# Patient Record
Sex: FEMALE | Race: WHITE | NOT HISPANIC OR LATINO | Employment: FULL TIME | URBAN - METROPOLITAN AREA
[De-identification: names, ages, dates, MRNs, and addresses within clinical notes are randomized per-mention and may not be internally consistent; named-entity substitution may affect disease eponyms.]

---

## 2017-04-04 ENCOUNTER — ALLSCRIPTS OFFICE VISIT (OUTPATIENT)
Dept: OTHER | Facility: OTHER | Age: 47
End: 2017-04-04

## 2017-04-04 DIAGNOSIS — E78.5 HYPERLIPIDEMIA: ICD-10-CM

## 2017-04-04 DIAGNOSIS — E55.9 VITAMIN D DEFICIENCY: ICD-10-CM

## 2017-04-04 DIAGNOSIS — R53.83 OTHER FATIGUE: ICD-10-CM

## 2017-04-08 ENCOUNTER — TRANSCRIBE ORDERS (OUTPATIENT)
Dept: ADMINISTRATIVE | Facility: HOSPITAL | Age: 47
End: 2017-04-08

## 2017-04-08 ENCOUNTER — APPOINTMENT (OUTPATIENT)
Dept: LAB | Facility: HOSPITAL | Age: 47
End: 2017-04-08
Payer: COMMERCIAL

## 2017-04-08 DIAGNOSIS — E55.9 VITAMIN D DEFICIENCY: ICD-10-CM

## 2017-04-08 DIAGNOSIS — R53.83 OTHER FATIGUE: ICD-10-CM

## 2017-04-08 LAB
25(OH)D3 SERPL-MCNC: 32.8 NG/ML (ref 30–100)
ALBUMIN SERPL BCP-MCNC: 4.1 G/DL (ref 3.5–5)
ALP SERPL-CCNC: 69 U/L (ref 46–116)
ALT SERPL W P-5'-P-CCNC: 44 U/L (ref 12–78)
ANION GAP SERPL CALCULATED.3IONS-SCNC: 8 MMOL/L (ref 4–13)
AST SERPL W P-5'-P-CCNC: 22 U/L (ref 5–45)
BILIRUB SERPL-MCNC: 1 MG/DL (ref 0.2–1)
BUN SERPL-MCNC: 12 MG/DL (ref 5–25)
CALCIUM SERPL-MCNC: 9.3 MG/DL (ref 8.3–10.1)
CHLORIDE SERPL-SCNC: 105 MMOL/L (ref 100–108)
CHOLEST SERPL-MCNC: 213 MG/DL (ref 50–200)
CO2 SERPL-SCNC: 30 MMOL/L (ref 21–32)
CREAT SERPL-MCNC: 0.74 MG/DL (ref 0.6–1.3)
ERYTHROCYTE [DISTWIDTH] IN BLOOD BY AUTOMATED COUNT: 13.7 % (ref 11.6–15.1)
GFR SERPL CREATININE-BSD FRML MDRD: >60 ML/MIN/1.73SQ M
GLUCOSE P FAST SERPL-MCNC: 91 MG/DL (ref 65–99)
HCT VFR BLD AUTO: 45.8 % (ref 34.8–46.1)
HDLC SERPL-MCNC: 63 MG/DL (ref 40–60)
HGB BLD-MCNC: 15.2 G/DL (ref 11.5–15.4)
LDLC SERPL CALC-MCNC: 123 MG/DL (ref 0–100)
MCH RBC QN AUTO: 30.6 PG (ref 26.8–34.3)
MCHC RBC AUTO-ENTMCNC: 33.2 G/DL (ref 31.4–37.4)
MCV RBC AUTO: 92 FL (ref 82–98)
PLATELET # BLD AUTO: 232 THOUSANDS/UL (ref 149–390)
PMV BLD AUTO: 11.1 FL (ref 8.9–12.7)
POTASSIUM SERPL-SCNC: 3.8 MMOL/L (ref 3.5–5.3)
PROT SERPL-MCNC: 7.1 G/DL (ref 6.4–8.2)
RBC # BLD AUTO: 4.96 MILLION/UL (ref 3.81–5.12)
SODIUM SERPL-SCNC: 143 MMOL/L (ref 136–145)
TRIGL SERPL-MCNC: 137 MG/DL
TSH SERPL DL<=0.05 MIU/L-ACNC: 1.5 UIU/ML (ref 0.36–3.74)
WBC # BLD AUTO: 4.96 THOUSAND/UL (ref 4.31–10.16)

## 2017-04-08 PROCEDURE — 84443 ASSAY THYROID STIM HORMONE: CPT

## 2017-04-08 PROCEDURE — 85027 COMPLETE CBC AUTOMATED: CPT

## 2017-04-08 PROCEDURE — 80061 LIPID PANEL: CPT

## 2017-04-08 PROCEDURE — 80053 COMPREHEN METABOLIC PANEL: CPT

## 2017-04-08 PROCEDURE — 82306 VITAMIN D 25 HYDROXY: CPT

## 2017-04-08 PROCEDURE — 36415 COLL VENOUS BLD VENIPUNCTURE: CPT

## 2017-04-10 ENCOUNTER — GENERIC CONVERSION - ENCOUNTER (OUTPATIENT)
Dept: OTHER | Facility: OTHER | Age: 47
End: 2017-04-10

## 2017-06-28 DIAGNOSIS — Z12.31 ENCOUNTER FOR SCREENING MAMMOGRAM FOR MALIGNANT NEOPLASM OF BREAST: ICD-10-CM

## 2017-07-10 DIAGNOSIS — E78.5 HYPERLIPIDEMIA: ICD-10-CM

## 2017-08-15 ENCOUNTER — ALLSCRIPTS OFFICE VISIT (OUTPATIENT)
Dept: OTHER | Facility: OTHER | Age: 47
End: 2017-08-15

## 2017-09-20 DIAGNOSIS — Z12.31 ENCOUNTER FOR SCREENING MAMMOGRAM FOR MALIGNANT NEOPLASM OF BREAST: ICD-10-CM

## 2017-10-11 ENCOUNTER — GENERIC CONVERSION - ENCOUNTER (OUTPATIENT)
Dept: OTHER | Facility: OTHER | Age: 47
End: 2017-10-11

## 2018-01-08 DIAGNOSIS — Z12.31 ENCOUNTER FOR SCREENING MAMMOGRAM FOR MALIGNANT NEOPLASM OF BREAST: ICD-10-CM

## 2018-01-11 NOTE — RESULT NOTES
Message   pap and hpv negative send letter     Verified Results  (1) THIN PREP PAP WITH IMAGING [id number] 01:22PM Inocencia Strauss     Test Name Result Flag Reference   LAB AP CASE REPORT (Report)     Gynecologic Cytology Report            Case: [id number]                  Authorizing Provider: Marko Rioc MD     Collected:      01/12/2016 1322        First Screen:     YAMILETH Dior    Received:      01/19/2016 1322        Specimen:  LIQUID-BASED PAP, SCREENING, Cervix   LAB AP GYN PRIMARY INTERPRETATION      Negative for intraepithelial lesion or malignancy   LAB AP GYN SPECIMEN ADEQUACY      Satisfactory for evaluation  Absence of endocervical/transformation zone component  LAB AP GYN ADDITIONAL INFORMATION (Report)     [employer] FDA approved ,  and ThinPrep Imaging System are   utilized with strict adherence to the 's instruction manual to   prepare gynecologic and non-gynecologic cytology specimens for the   production of ThinPrep slides as well as for gynecologic ThinPrep imaging  These processes have been validated by our laboratory and/or by the     The Pap test is not a diagnostic procedure and should not be used as the   sole means to detect cervical cancer  It is only a screening procedure to   aid in the detection of cervical cancer and its precursors  Both   false-negative and false-positive results have been experienced  Your   patient's test result should be interpreted in this context together with   the history and clinical findings     LAB AP LMP 1/12/2014

## 2018-01-12 VITALS
HEIGHT: 68 IN | DIASTOLIC BLOOD PRESSURE: 78 MMHG | WEIGHT: 182.4 LBS | BODY MASS INDEX: 27.65 KG/M2 | SYSTOLIC BLOOD PRESSURE: 122 MMHG

## 2018-01-14 VITALS
BODY MASS INDEX: 28.72 KG/M2 | WEIGHT: 189.5 LBS | HEIGHT: 68 IN | DIASTOLIC BLOOD PRESSURE: 68 MMHG | SYSTOLIC BLOOD PRESSURE: 116 MMHG

## 2018-01-14 NOTE — RESULT NOTES
Verified Results  * XR FOOT 3+ VIEW RIGHT [id number] 01:54PM Valentino Somerset    Order Number: [id number]     Test Name Result Flag Reference   XR FOOT 3+ VW RIGHT (Report)     RIGHT FOOT     INDICATION: Pain over the 4th and 5th metatarsals for 2 months     COMPARISON: None     VIEWS: 3; 3 images     FINDINGS:     There is no acute fracture or dislocation  No degenerative changes  No lytic or blastic lesions are seen  Soft tissues are unremarkable  IMPRESSION:     No acute osseous abnormality         Workstation performed: [id number]     Signed by:   Shon Buerger, MD   2/12/16

## 2018-01-15 NOTE — RESULT NOTES
Verified Results  (1) VITAMIN D 25-HYDROXY 08Apr2017 08:37AM Ronda Bence Order Number: [id number]     Test Name Result Flag Reference   VIT D 25-HYDROX 32 8 ng/mL  30 0-100 0   This assay is a certified procedure of the CDC Vitamin D Standardization Certification Program (VDSCP)     Deficiency <20ng/ml   Insufficiency 20-30ng/ml   Sufficient  ng/ml     *Patients undergoing fluorescein dye angiography may retain small amounts of fluorescein in the body for 48-72 hours post procedure  Samples containing fluorescein can produce falsely elevated Vitamin D values  If the patient had this procedure, a specimen should be resubmitted post fluorescein clearance  (1) TSH 08Apr2017 08:37AM Ronda Bence Order Number: [id number]     Test Name Result Flag Reference   TSH 1 504 uIU/mL  0 358-3 740   - Patient Instructions: This bloodwork is non-fasting  Please drink two glasses of water morning of bloodwork  - Patient Instructions: This is a fasting blood test  Water, black tea or black coffee only after 9:00pm the night before test Drink 2 glasses of water the morning of test - Patient Instructions: This bloodwork is non-fasting  Please drink two glasses of   water morning of bloodwork  Patients undergoing fluorescein dye angiography may retain small amounts of fluorescein in the body for 48-72 hours post procedure  Samples containing fluorescein can produce falsely depressed TSH values  If the patient had this procedure,a specimen should be resubmitted post fluorescein clearance            The recommended reference ranges for TSH during pregnancy are as follows:  First trimester 0 1 to 2 5 uIU/mL  Second trimester  0 2 to 3 0 uIU/mL  Third trimester 0 3 to 3 0 uIU/m     (1) LIPID PANEL FASTING W DIRECT LDL REFLEX 08Apr2017 08:37AM Ronda Bence Order Number: [id number]     Test Name Result Flag Reference   CHOLESTEROL 213 mg/dL H    LDL CHOLESTEROL CALCULATED 123 mg/dL H 0-100   - Patient Instructions: This is a fasting blood test  Water, black tea or black coffee only after 9:00pm the night before test   Drink 2 glasses of water the morning of test     - Patient Instructions: This is a fasting blood test  Water, black tea or black coffee only after 9:00pm the night before test Drink 2 glasses of water the morning of test - Patient Instructions: This bloodwork is non-fasting  Please drink two glasses of   water morning of bloodwork  Triglyceride:         Normal              <150 mg/dl       Borderline High    150-199 mg/dl       High               200-499 mg/dl       Very High          >499 mg/dl  Cholesterol:         Desirable        <200 mg/dl      Borderline High  200-239 mg/dl      High             >239 mg/dl  HDL Cholesterol:        High    >59 mg/dL      Low     <41 mg/dL  LDL Cholesterol:        Optimal          <100 mg/dl        Near Optimal     100-129 mg/dl        Above Optimal          Borderline High   130-159 mg/dl          High              160-189 mg/dl          Very High        >189 mg/dl  LDL CALCULATED:    This screening LDL is a calculated result  It does not have the accuracy of the Direct Measured LDL in the monitoring of patients with hyperlipidemia and/or statin therapy  Direct Measure LDL (VTC165) must be ordered separately in these patients  TRIGLYCERIDES 137 mg/dL  <=150   Specimen collection should occur prior to N-Acetylcysteine or Metamizole administration due to the potential for falsely depressed results  HDL,DIRECT 63 mg/dL H 40-60   Specimen collection should occur prior to Metamizole administration due to the potential for falsely depressed results       (1) CBC/ PLT (NO DIFF) 08Apr2017 08:37AM Allison Estancia Order Number: [id number]     Test Name Result Flag Reference   HEMATOCRIT 45 8 %  34 8-46 1   HEMOGLOBIN 15 2 g/dL  11 5-15 4   MCHC 33 2 g/dL  31 4-37 4   MCH 30 6 pg  26 8-34 3   MCV 92 fL  82-98   PLATELET COUNT 232 Thousands/uL  149-390   RBC COUNT 4 96 Million/uL  3 81-5 12   RDW 13 7 %  11 6-15 1   WBC COUNT 4 96 Thousand/uL  4 31-10 16   MPV 11 1 fL  8 9-12 7     (1) COMPREHENSIVE METABOLIC PANEL [id number] 85:17CA Eliana Schumacher Order Number: [id number]     Test Name Result Flag Reference   SODIUM 143 mmol/L  136-145   POTASSIUM 3 8 mmol/L  3 5-5 3   CHLORIDE 105 mmol/L  100-108   CARBON DIOXIDE 30 mmol/L  21-32   ANION GAP (CALC) 8 mmol/L  4-13   BLOOD UREA NITROGEN 12 mg/dL  5-25   CREATININE 0 74 mg/dL  0 60-1 30   Standardized to IDMS reference method   CALCIUM 9 3 mg/dL  8 3-10 1   BILI, TOTAL 1 00 mg/dL  0 20-1 00   ALK PHOSPHATAS 69 U/L     ALT (SGPT) 44 U/L  12-78   AST(SGOT) 22 U/L  5-45   ALBUMIN 4 1 g/dL  3 5-5 0   TOTAL PROTEIN 7 1 g/dL  6 4-8 2   eGFR Non-African American      >60 0 ml/min/1 73sq m   - Patient Instructions: This is a fasting blood test  Water, black tea or black coffee only after 9:00pm the night before test Drink 2 glasses of water the morning of test - Patient Instructions: This bloodwork is non-fasting  Please drink two glasses of   water morning of bloodwork  National Kidney Disease Education Program recommendations are as follows:  GFR calculation is accurate only with a steady state creatinine  Chronic Kidney disease less than 60 ml/min/1 73 sq  meters  Kidney failure less than 15 ml/min/1 73 sq  meters     GLUCOSE FASTING 91 mg/dL  65-99

## 2018-01-16 NOTE — RESULT NOTES
Verified Results  (1) THIN PREP PAP WITH IMAGING [id number] 01:22PM Connie Shea   Other High Risk HPV Negative, HPV 16 Negative, HPV 18 Negative  HPV types: 16,18,31,33,35,39,45,51,52,56,58,59,66 and 68 DNA are undetectable or below the pre-set threshold  [employer]'s FDA approved Mango 4800 is utilized with strict adherence to the 's instruction  manual to test for the presence of High-Risk HPV DNA, as well as HPV 16 and HPV 18  This instrument  has been validated by our laboratory and/or by the   A negative result does not preclude the presence of HPV infection because results depend on adequate  specimen collection, absence of inhibitors and sufficient DNA to be detected  Additionally, HPV negative  results are not intended to prevent women from proceeding to colposcopy if clinically warranted  Positive HPV test results indicate the presence of any one or more of the high risk types, but since patients  are often co-infected with low-risk types it does not rule out the presence of low-risk types in patients  with mixed infections       Test Name Result Flag Reference   HPV, HIGH-RISK   HPV NEG, HPV16 NEG, HPV18 NEG   HPV NEG, HPV16 NEG, HPV18 NEG

## 2018-01-18 NOTE — RESULT NOTES
Verified Results  (1) VITAMIN D 25-HYDROXY 20Apr2016 02:01PM Haley Irvin Order Number: [id number]     Order Number: [id number]     Test Name Result Flag Reference   VIT D 25-HYDROX 31 9 ng/mL  30 0-100 0

## 2018-03-26 DIAGNOSIS — I10 ESSENTIAL HYPERTENSION: Primary | ICD-10-CM

## 2018-03-26 RX ORDER — METOPROLOL SUCCINATE 25 MG/1
TABLET, EXTENDED RELEASE ORAL
Qty: 90 TABLET | Refills: 3 | Status: SHIPPED | OUTPATIENT
Start: 2018-03-26 | End: 2019-03-24 | Stop reason: SDUPTHER

## 2018-04-09 ENCOUNTER — APPOINTMENT (OUTPATIENT)
Dept: URGENT CARE | Facility: CLINIC | Age: 48
End: 2018-04-09
Payer: OTHER MISCELLANEOUS

## 2018-04-09 PROCEDURE — 99283 EMERGENCY DEPT VISIT LOW MDM: CPT | Performed by: NURSE PRACTITIONER

## 2018-04-09 PROCEDURE — G0382 LEV 3 HOSP TYPE B ED VISIT: HCPCS | Performed by: NURSE PRACTITIONER

## 2018-04-11 ENCOUNTER — APPOINTMENT (OUTPATIENT)
Dept: URGENT CARE | Facility: CLINIC | Age: 48
End: 2018-04-11
Payer: OTHER MISCELLANEOUS

## 2018-04-11 PROCEDURE — 99213 OFFICE O/P EST LOW 20 MIN: CPT | Performed by: PHYSICIAN ASSISTANT

## 2018-04-18 ENCOUNTER — APPOINTMENT (OUTPATIENT)
Dept: URGENT CARE | Facility: CLINIC | Age: 48
End: 2018-04-18
Payer: OTHER MISCELLANEOUS

## 2018-04-18 PROCEDURE — 99213 OFFICE O/P EST LOW 20 MIN: CPT | Performed by: PHYSICIAN ASSISTANT

## 2018-05-07 ENCOUNTER — APPOINTMENT (OUTPATIENT)
Dept: URGENT CARE | Facility: CLINIC | Age: 48
End: 2018-05-07
Payer: OTHER MISCELLANEOUS

## 2018-05-07 PROCEDURE — 99213 OFFICE O/P EST LOW 20 MIN: CPT | Performed by: PHYSICIAN ASSISTANT

## 2018-05-08 ENCOUNTER — ANNUAL EXAM (OUTPATIENT)
Dept: OBGYN CLINIC | Facility: CLINIC | Age: 48
End: 2018-05-08
Payer: COMMERCIAL

## 2018-05-08 VITALS — BODY MASS INDEX: 28.86 KG/M2 | DIASTOLIC BLOOD PRESSURE: 70 MMHG | WEIGHT: 187 LBS | SYSTOLIC BLOOD PRESSURE: 122 MMHG

## 2018-05-08 DIAGNOSIS — Z01.419 ENCOUNTER FOR WELL WOMAN EXAM WITH ROUTINE GYNECOLOGICAL EXAM: Primary | ICD-10-CM

## 2018-05-08 PROCEDURE — S0612 ANNUAL GYNECOLOGICAL EXAMINA: HCPCS | Performed by: OBSTETRICS & GYNECOLOGY

## 2018-05-08 RX ORDER — DESVENLAFAXINE 50 MG/1
TABLET, EXTENDED RELEASE ORAL
COMMUNITY
End: 2018-07-16 | Stop reason: SDUPTHER

## 2018-05-08 RX ORDER — FEXOFENADINE HCL 180 MG/1
1 TABLET ORAL DAILY
COMMUNITY
Start: 2015-04-10 | End: 2021-10-20

## 2018-05-08 NOTE — PROGRESS NOTES
ASSESSMENT & PLAN: Darron Harrison is a 50 y o  [id number] with normal gynecologic exam     1   Routine well woman exam done today  2  Pap and HPV:  The patient's last pap and hpv was 2016  Prior hysterectomy, but had abnormal paps in past     It was normal     Pap and cotesting was not done today  Current ASCCP Guidelines reviewed  3   Mammogram ordered  4  The following were reviewed in today's visit: breast self exam and mammography screening ordered      CC:  Annual Gynecologic Examination    HPI: Darron Harrison is a 50 y o  [id number] who presents for annual gynecologic examination  She has the following concerns:  No GYN complaints    Health Maintenance:    She wears her seatbelt routinely  She does perform regular monthly self breast exams  She feels safe at home  History reviewed  No pertinent past medical history  Past Surgical History:   Procedure Laterality Date    CHOLECYSTECTOMY      HYSTERECTOMY      TOOTH EXTRACTION         Past OB/Gyn History:  OB History      Para Term  AB Living    4 3 3   1 3    SAB TAB Ectopic Multiple Live Births    1       3           Pt does not have menstrual issues     History of sexually transmitted infection: no   History of abnormal pap smears: Yes   Patient is currently sexually active  heterosexual  The current method of family planning is status post hysterectomy  Family History   Problem Relation Age of Onset    Alzheimer's disease Mother     Heart disease Father      cardiac disorder    Prostate cancer Father        Social History:  Social History     Social History    Marital status: /Civil Union     Spouse name: N/A    Number of children: N/A    Years of education: N/A     Occupational History    Not on file       Social History Main Topics    Smoking status: Never Smoker    Smokeless tobacco: Never Used    Alcohol use Yes      Comment: social drinker    Drug use: No    Sexual activity: Yes     Partners: Male     Birth control/ protection: None     Other Topics Concern    Not on file     Social History Narrative    No narrative on file     Allergies   Allergen Reactions    Codeine Other (See Comments)     "throat swells"       Current Outpatient Prescriptions:     desvenlafaxine succinate (PRISTIQ) 50 mg 24 hr tablet, Pristiq 50 mg tablet,extended release, Disp: , Rfl:     fexofenadine (ALLEGRA) 180 MG tablet, Take 1 tablet by mouth daily, Disp: , Rfl:     metoprolol succinate (TOPROL-XL) 25 mg 24 hr tablet, TAKE 1 TABLET DAILY, Disp: 90 tablet, Rfl: 3    Review of Systems:    Review of Systems  Constitutional :no fever, feels well, no tiredness, no recent weight gain or loss  ENT: no ear ache, no loss of hearing, no nosebleeds or nasal discharge, no sore throat or hoarseness  Cardiovascular: no complaints of slow or fast heart beat, no chest pain, no palpitations, no leg claudication or lower extremity edema  Respiratory: no complaints of shortness of shortness of breath, no LI  Breasts:no complaints of breast pain, breast lump, or nipple discharge  Gastrointestinal: no complaints of abdominal pain, constipation, nausea, vomiting, or diarrhea or bloody stools  Genitourinary : no complaints of dysuria, incontinence, pelvic pain, no dysmenorrhea, vaginal discharge or abnormal vaginal bleeding and as noted in HPI  Musculoskeletal: no complaints of arthralgia, no myalgia, no joint swelling or stiffness, no limb pain or swelling    Integumentary: no complaints of skin rash or lesion, itching or dry skin  Neurological: no complaints of headache, no confusion, no numbness or tingling, no dizziness or fainting    Objective      /70   Wt 84 8 kg (187 lb)   BMI 28 86 kg/m²     General:   appears stated age, cooperative, alert normal mood and affect   Neck: normal, supple,trachea midline, no masses   Heart: regular rate and rhythm, S1, S2 normal, no murmur, click, rub or gallop   Lungs: clear to auscultation bilaterally   Breasts: normal appearance, no masses or tenderness, Inspection negative, No nipple retraction or dimpling, No nipple discharge or bleeding, No axillary or supraclavicular adenopathy, Normal to palpation without dominant masses   Abdomen: soft, non-tender, without masses or organomegaly   Vulva: normal, normal female genitalia, Bartholin's, Urethra, [place] normal, no lesions, normal female hair distribution   Vagina: normal vagina, no discharge, exudate, lesion, or erythema   Urethra: normal   Cervix: Absent  Uterus: uterus absent   Adnexa: no mass, fullness, tenderness   Lymphatic palpation of lymph nodes in neck, axilla, groin and/or other locations: no lymphadenopathy or masses noted   Skin normal skin turgor and no rashes     Psychiatric orientation to person, place, and time: normal  mood and affect: normal

## 2018-07-16 DIAGNOSIS — F41.8 ANXIETY ASSOCIATED WITH DEPRESSION: Primary | ICD-10-CM

## 2018-07-16 RX ORDER — DESVENLAFAXINE 50 MG/1
TABLET, EXTENDED RELEASE ORAL
Qty: 90 TABLET | Refills: 1 | Status: SHIPPED | OUTPATIENT
Start: 2018-07-16 | End: 2019-01-12 | Stop reason: SDUPTHER

## 2018-11-09 ENCOUNTER — TELEPHONE (OUTPATIENT)
Dept: FAMILY MEDICINE CLINIC | Facility: CLINIC | Age: 48
End: 2018-11-09

## 2018-11-09 DIAGNOSIS — Z12.39 SCREENING FOR BREAST CANCER: Primary | ICD-10-CM

## 2019-01-12 DIAGNOSIS — F41.8 ANXIETY ASSOCIATED WITH DEPRESSION: ICD-10-CM

## 2019-01-14 ENCOUNTER — OFFICE VISIT (OUTPATIENT)
Dept: FAMILY MEDICINE CLINIC | Facility: CLINIC | Age: 49
End: 2019-01-14
Payer: COMMERCIAL

## 2019-01-14 VITALS
DIASTOLIC BLOOD PRESSURE: 90 MMHG | SYSTOLIC BLOOD PRESSURE: 148 MMHG | HEIGHT: 68 IN | WEIGHT: 198.8 LBS | BODY MASS INDEX: 30.13 KG/M2

## 2019-01-14 DIAGNOSIS — Z12.11 SCREENING FOR COLON CANCER: ICD-10-CM

## 2019-01-14 DIAGNOSIS — Z00.00 ANNUAL PHYSICAL EXAM: ICD-10-CM

## 2019-01-14 DIAGNOSIS — Z00.00 WELL ADULT EXAM: Primary | ICD-10-CM

## 2019-01-14 DIAGNOSIS — E78.2 MIXED HYPERLIPIDEMIA: ICD-10-CM

## 2019-01-14 PROCEDURE — 99396 PREV VISIT EST AGE 40-64: CPT | Performed by: PHYSICIAN ASSISTANT

## 2019-01-14 RX ORDER — DESVENLAFAXINE 50 MG/1
TABLET, EXTENDED RELEASE ORAL
Qty: 90 TABLET | Refills: 1 | Status: SHIPPED | OUTPATIENT
Start: 2019-01-14 | End: 2019-07-13 | Stop reason: SDUPTHER

## 2019-01-14 RX ORDER — ROSUVASTATIN CALCIUM 10 MG/1
10 TABLET, COATED ORAL DAILY
Qty: 90 TABLET | Refills: 1 | Status: SHIPPED | OUTPATIENT
Start: 2019-01-14 | End: 2019-07-18 | Stop reason: SDUPTHER

## 2019-01-14 RX ORDER — ROSUVASTATIN CALCIUM 10 MG/1
1 TABLET, COATED ORAL
COMMUNITY
Start: 2017-05-09 | End: 2019-01-14 | Stop reason: SDUPTHER

## 2019-01-14 NOTE — PATIENT INSTRUCTIONS

## 2019-01-14 NOTE — PROGRESS NOTES
ADULT ANNUAL PHYSICAL  [facility] - [id number] FAMILY PRACTICE    NAME: Radha Danielson  AGE: 46 y o  SEX: female  : 1970     DATE: 2019     Assessment and Plan:     Problem List Items Addressed This Visit        Other    Mixed hyperlipidemia    Relevant Medications    rosuvastatin (CRESTOR) 10 MG tablet    Other Relevant Orders    CBC    Comprehensive metabolic panel    Lipid Panel with Direct LDL reflex      Other Visit Diagnoses     Well adult exam    -  Primary    Screening for colon cancer        Relevant Orders    Ambulatory referral to Gastroenterology    Annual physical exam              Health maintenance and preventative care screenings were discussed with patient today  Appropriate education was printed on patient's after visit summary  · Discussed risks/benefits of screening for breast cancer, cervical cancer and colorectal cancer  Patient agrees to screening for breast cancer, cervical cancer and colorectal cancer  · Immunizations were reviewed: patient is up-to-date with her immunizations  Pt will call Dr Obed Marinelli and schedule an appointment on her own  Counseling:  · Dental Health: discussed importance of regular tooth brushing, flossing, and dental visits  No Follow-up on file  Chief Complaint:     Chief Complaint   Patient presents with    Well Check     Pt here for annual well visit      History of Present Illness:     Adult Annual Physical   Patient here for a comprehensive physical exam  The patient reports no problems  Diet and Physical Activity  · Diet/Nutrition: frequent junk food  · Weight concerns: patient has class 1 obesity (BMI 30-34 9)  · Exercise: no formal exercise        Depression Screening  PHQ-9 Depression Screening    PHQ-9:    Frequency of the following problems over the past two weeks:       Little interest or pleasure in doing things:  0 - not at all  Feeling down, depressed, or hopeless:  0 - not at all  PHQ-2 Score:  0 General Health  · Sleep: sleeps well  · Hearing: normal - bilateral   · Vision: no vision problems  · Dental: regular dental visits  Review of Systems:     Review of Systems   Constitutional: Negative for activity change, appetite change, chills, diaphoresis, fatigue, fever and unexpected weight change  HENT: Negative for congestion, ear pain, postnasal drip, rhinorrhea, sinus pain, sinus pressure, sneezing, sore throat, tinnitus and voice change  Eyes: Negative for pain, redness and visual disturbance  Respiratory: Negative for cough, chest tightness, shortness of breath and wheezing  Cardiovascular: Negative for chest pain, palpitations and leg swelling  Gastrointestinal: Negative for abdominal pain, blood in stool, constipation, diarrhea, nausea and vomiting  Genitourinary: Negative for difficulty urinating, dysuria, frequency, hematuria and urgency  Musculoskeletal: Negative for arthralgias, back pain, gait problem, joint swelling, myalgias, neck pain and neck stiffness  Skin: Negative for color change, pallor, rash and wound  Neurological: Negative for dizziness, tremors, weakness, light-headedness and headaches  Psychiatric/Behavioral: Negative for dysphoric mood, self-injury, sleep disturbance and suicidal ideas  The patient is not nervous/anxious  Past Medical History:     History reviewed  No pertinent past medical history     Past Surgical History:     Past Surgical History:   Procedure Laterality Date    CHOLECYSTECTOMY  1995    HYSTERECTOMY      TOOTH EXTRACTION        Social History:     Social History     Social History    Marital status: /Civil Union     Spouse name: N/A    Number of children: N/A    Years of education: N/A     Social History Main Topics    Smoking status: Never Smoker    Smokeless tobacco: Never Used    Alcohol use Yes      Comment: social drinker    Drug use: No    Sexual activity: Yes     Partners: Male     Birth control/ protection: None     Other Topics Concern    None     Social History Narrative    None      Family History:     Family History   Problem Relation Age of Onset    Alzheimer's disease Mother     Heart disease Father         cardiac disorder    Prostate cancer Father       Current Medications:     Current Outpatient Prescriptions   Medication Sig Dispense Refill    Cholecalciferol (RA VITAMIN D-3) 2000 units CAPS Take by mouth daily      desvenlafaxine succinate (PRISTIQ) 50 mg 24 hr tablet TAKE 1 TABLET DAILY 90 tablet 1    fexofenadine (ALLEGRA) 180 MG tablet Take 1 tablet by mouth daily      metoprolol succinate (TOPROL-XL) 25 mg 24 hr tablet TAKE 1 TABLET DAILY 90 tablet 3    rosuvastatin (CRESTOR) 10 MG tablet Take 1 tablet (10 mg total) by mouth daily 90 tablet 1     No current facility-administered medications for this visit  Allergies: Allergies   Allergen Reactions    Codeine Other (See Comments)     "throat swells"      Objective:     /90 (BP Location: Left arm, Patient Position: Sitting)   Ht 5' 8" (1 727 m)   Wt 90 2 kg (198 lb 12 8 oz)   BMI 30 23 kg/m²     Physical Exam   Constitutional: She is oriented to person, place, and time  She appears well-developed and well-nourished  No distress  HENT:   Head: Normocephalic and atraumatic  Right Ear: External ear normal    Left Ear: External ear normal    Nose: Nose normal    Mouth/Throat: Oropharynx is clear and moist  No oropharyngeal exudate  Eyes: Pupils are equal, round, and reactive to light  Conjunctivae and EOM are normal  Right eye exhibits no discharge  Left eye exhibits no discharge  No scleral icterus  Neck: Normal range of motion  Neck supple  No thyromegaly present  Cardiovascular: Normal rate, regular rhythm and normal heart sounds  No murmur heard  Pulmonary/Chest: Effort normal and breath sounds normal  No respiratory distress  She has no wheezes  Abdominal: Soft   Bowel sounds are normal  She exhibits no distension  There is no tenderness  Musculoskeletal: Normal range of motion  She exhibits no edema or tenderness  Lymphadenopathy:     She has no cervical adenopathy  Neurological: She is alert and oriented to person, place, and time  Skin: Skin is warm and dry  Capillary refill takes less than 2 seconds  She is not diaphoretic  Psychiatric: She has a normal mood and affect  Her behavior is normal  Judgment and thought content normal    Vitals reviewed         Health Maintenance:     Health Maintenance   Topic Date Due    Depression Screening PHQ  1970    CRC Screening: Colonoscopy  1970    MAMMOGRAM  12/21/2016    INFLUENZA VACCINE  07/01/2018    PAP SMEAR  01/12/2019    DTaP,Tdap,and Td Vaccines (2 - Td) 08/15/2027     Immunization History   Administered Date(s) Administered    Influenza 10/01/2016, 10/11/2017, 10/14/2018    Influenza Quadrivalent Preservative Free 3 years and older IM 10/01/2016    Influenza TIV (IM) 10/01/2013, 10/11/2017    Tdap 08/15/2017       Geraldine Carbone PA-C  [facility]

## 2019-01-19 ENCOUNTER — LAB (OUTPATIENT)
Dept: LAB | Facility: MEDICAL CENTER | Age: 49
End: 2019-01-19
Payer: COMMERCIAL

## 2019-01-19 DIAGNOSIS — E78.2 MIXED HYPERLIPIDEMIA: ICD-10-CM

## 2019-01-19 LAB
ALBUMIN SERPL BCP-MCNC: 4 G/DL (ref 3.5–5)
ALP SERPL-CCNC: 71 U/L (ref 46–116)
ALT SERPL W P-5'-P-CCNC: 31 U/L (ref 12–78)
ANION GAP SERPL CALCULATED.3IONS-SCNC: 5 MMOL/L (ref 4–13)
AST SERPL W P-5'-P-CCNC: 14 U/L (ref 5–45)
BILIRUB SERPL-MCNC: 1.18 MG/DL (ref 0.2–1)
BUN SERPL-MCNC: 19 MG/DL (ref 5–25)
CALCIUM SERPL-MCNC: 8.9 MG/DL (ref 8.3–10.1)
CHLORIDE SERPL-SCNC: 106 MMOL/L (ref 100–108)
CHOLEST SERPL-MCNC: 203 MG/DL (ref 50–200)
CO2 SERPL-SCNC: 26 MMOL/L (ref 21–32)
CREAT SERPL-MCNC: 0.66 MG/DL (ref 0.6–1.3)
ERYTHROCYTE [DISTWIDTH] IN BLOOD BY AUTOMATED COUNT: 13.2 % (ref 11.6–15.1)
GFR SERPL CREATININE-BSD FRML MDRD: 103 ML/MIN/1.73SQ M
GLUCOSE P FAST SERPL-MCNC: 88 MG/DL (ref 65–99)
HCT VFR BLD AUTO: 44.6 % (ref 34.8–46.1)
HDLC SERPL-MCNC: 53 MG/DL (ref 40–60)
HGB BLD-MCNC: 14.5 G/DL (ref 11.5–15.4)
LDLC SERPL CALC-MCNC: 119 MG/DL (ref 0–100)
MCH RBC QN AUTO: 30.5 PG (ref 26.8–34.3)
MCHC RBC AUTO-ENTMCNC: 32.5 G/DL (ref 31.4–37.4)
MCV RBC AUTO: 94 FL (ref 82–98)
PLATELET # BLD AUTO: 246 THOUSANDS/UL (ref 149–390)
PMV BLD AUTO: 10.5 FL (ref 8.9–12.7)
POTASSIUM SERPL-SCNC: 3.8 MMOL/L (ref 3.5–5.3)
PROT SERPL-MCNC: 7.5 G/DL (ref 6.4–8.2)
RBC # BLD AUTO: 4.76 MILLION/UL (ref 3.81–5.12)
SODIUM SERPL-SCNC: 137 MMOL/L (ref 136–145)
TRIGL SERPL-MCNC: 153 MG/DL
WBC # BLD AUTO: 6.31 THOUSAND/UL (ref 4.31–10.16)

## 2019-01-19 PROCEDURE — 36415 COLL VENOUS BLD VENIPUNCTURE: CPT

## 2019-01-19 PROCEDURE — 80053 COMPREHEN METABOLIC PANEL: CPT

## 2019-01-19 PROCEDURE — 85027 COMPLETE CBC AUTOMATED: CPT

## 2019-01-19 PROCEDURE — 80061 LIPID PANEL: CPT

## 2019-03-24 DIAGNOSIS — I10 ESSENTIAL HYPERTENSION: ICD-10-CM

## 2019-03-25 RX ORDER — METOPROLOL SUCCINATE 25 MG/1
TABLET, EXTENDED RELEASE ORAL
Qty: 90 TABLET | Refills: 3 | Status: SHIPPED | OUTPATIENT
Start: 2019-03-25 | End: 2020-02-21 | Stop reason: SDUPTHER

## 2019-07-13 DIAGNOSIS — F41.8 ANXIETY ASSOCIATED WITH DEPRESSION: ICD-10-CM

## 2019-07-15 ENCOUNTER — TELEPHONE (OUTPATIENT)
Dept: FAMILY MEDICINE CLINIC | Facility: CLINIC | Age: 49
End: 2019-07-15

## 2019-07-15 DIAGNOSIS — E78.2 MIXED HYPERLIPIDEMIA: Primary | ICD-10-CM

## 2019-07-15 RX ORDER — DESVENLAFAXINE 50 MG/1
TABLET, EXTENDED RELEASE ORAL
Qty: 90 TABLET | Refills: 1 | Status: SHIPPED | OUTPATIENT
Start: 2019-07-15 | End: 2020-01-13

## 2019-07-17 ENCOUNTER — APPOINTMENT (OUTPATIENT)
Dept: LAB | Facility: MEDICAL CENTER | Age: 49
End: 2019-07-17
Payer: COMMERCIAL

## 2019-07-17 DIAGNOSIS — E78.2 MIXED HYPERLIPIDEMIA: ICD-10-CM

## 2019-07-17 LAB
ALBUMIN SERPL BCP-MCNC: 4.2 G/DL (ref 3.5–5)
ALP SERPL-CCNC: 71 U/L (ref 46–116)
ALT SERPL W P-5'-P-CCNC: 41 U/L (ref 12–78)
ANION GAP SERPL CALCULATED.3IONS-SCNC: 8 MMOL/L (ref 4–13)
AST SERPL W P-5'-P-CCNC: 18 U/L (ref 5–45)
BILIRUB SERPL-MCNC: 1.24 MG/DL (ref 0.2–1)
BUN SERPL-MCNC: 13 MG/DL (ref 5–25)
CALCIUM SERPL-MCNC: 9.1 MG/DL (ref 8.3–10.1)
CHLORIDE SERPL-SCNC: 107 MMOL/L (ref 100–108)
CHOLEST SERPL-MCNC: 163 MG/DL (ref 50–200)
CO2 SERPL-SCNC: 24 MMOL/L (ref 21–32)
CREAT SERPL-MCNC: 0.64 MG/DL (ref 0.6–1.3)
ERYTHROCYTE [DISTWIDTH] IN BLOOD BY AUTOMATED COUNT: 13.2 % (ref 11.6–15.1)
GFR SERPL CREATININE-BSD FRML MDRD: 104 ML/MIN/1.73SQ M
GLUCOSE P FAST SERPL-MCNC: 91 MG/DL (ref 65–99)
HCT VFR BLD AUTO: 43.4 % (ref 34.8–46.1)
HDLC SERPL-MCNC: 63 MG/DL (ref 40–60)
HGB BLD-MCNC: 14.4 G/DL (ref 11.5–15.4)
LDLC SERPL CALC-MCNC: 77 MG/DL (ref 0–100)
MCH RBC QN AUTO: 30.8 PG (ref 26.8–34.3)
MCHC RBC AUTO-ENTMCNC: 33.2 G/DL (ref 31.4–37.4)
MCV RBC AUTO: 93 FL (ref 82–98)
PLATELET # BLD AUTO: 241 THOUSANDS/UL (ref 149–390)
PMV BLD AUTO: 10.9 FL (ref 8.9–12.7)
POTASSIUM SERPL-SCNC: 3.8 MMOL/L (ref 3.5–5.3)
PROT SERPL-MCNC: 7.3 G/DL (ref 6.4–8.2)
RBC # BLD AUTO: 4.67 MILLION/UL (ref 3.81–5.12)
SODIUM SERPL-SCNC: 139 MMOL/L (ref 136–145)
TRIGL SERPL-MCNC: 113 MG/DL
WBC # BLD AUTO: 5.89 THOUSAND/UL (ref 4.31–10.16)

## 2019-07-17 PROCEDURE — 80061 LIPID PANEL: CPT

## 2019-07-17 PROCEDURE — 36415 COLL VENOUS BLD VENIPUNCTURE: CPT

## 2019-07-17 PROCEDURE — 80053 COMPREHEN METABOLIC PANEL: CPT

## 2019-07-17 PROCEDURE — 85027 COMPLETE CBC AUTOMATED: CPT

## 2019-07-18 DIAGNOSIS — E78.2 MIXED HYPERLIPIDEMIA: ICD-10-CM

## 2019-07-18 RX ORDER — ROSUVASTATIN CALCIUM 10 MG/1
10 TABLET, COATED ORAL DAILY
Qty: 90 TABLET | Refills: 1 | Status: SHIPPED | OUTPATIENT
Start: 2019-07-18 | End: 2019-12-25 | Stop reason: SDUPTHER

## 2019-08-14 ENCOUNTER — OFFICE VISIT (OUTPATIENT)
Dept: FAMILY MEDICINE CLINIC | Facility: CLINIC | Age: 49
End: 2019-08-14
Payer: COMMERCIAL

## 2019-08-14 VITALS
DIASTOLIC BLOOD PRESSURE: 92 MMHG | BODY MASS INDEX: 29.4 KG/M2 | WEIGHT: 194 LBS | SYSTOLIC BLOOD PRESSURE: 160 MMHG | HEIGHT: 68 IN

## 2019-08-14 DIAGNOSIS — I10 ESSENTIAL HYPERTENSION: Primary | ICD-10-CM

## 2019-08-14 PROCEDURE — 99213 OFFICE O/P EST LOW 20 MIN: CPT | Performed by: FAMILY MEDICINE

## 2019-08-14 PROCEDURE — 3008F BODY MASS INDEX DOCD: CPT | Performed by: FAMILY MEDICINE

## 2019-08-14 PROCEDURE — 1036F TOBACCO NON-USER: CPT | Performed by: FAMILY MEDICINE

## 2019-08-14 RX ORDER — LORAZEPAM 0.5 MG/1
0.5 TABLET ORAL
Qty: 15 TABLET | Refills: 0 | Status: SHIPPED | OUTPATIENT
Start: 2019-08-14 | End: 2021-08-16 | Stop reason: ALTCHOICE

## 2019-08-14 RX ORDER — HYDROCHLOROTHIAZIDE 12.5 MG/1
12.5 TABLET ORAL DAILY
Qty: 30 TABLET | Refills: 5 | Status: SHIPPED | OUTPATIENT
Start: 2019-08-14 | End: 2020-01-28 | Stop reason: SDUPTHER

## 2019-08-14 NOTE — PROGRESS NOTES
Assessment/Plan:    Problem List Items Addressed This Visit        Cardiovascular and Mediastinum    Hypertension - Primary    Relevant Medications    hydrochlorothiazide (HYDRODIURIL) 12 5 mg tablet    LORazepam (ATIVAN) 0 5 mg tablet           Diagnoses and all orders for this visit:    Essential hypertension  -     hydrochlorothiazide (HYDRODIURIL) 12 5 mg tablet; Take 1 tablet (12 5 mg total) by mouth daily  -     LORazepam (ATIVAN) 0 5 mg tablet; Take 1 tablet (0 5 mg total) by mouth daily at bedtime        No problem-specific Assessment & Plan notes found for this encounter  Subjective:      Patient ID: Rajni Rueda is a 46 y o  female  Mrs  Had ST here blood pressure is elevated due to stress on patient will be started on 5012 5 mg of hydrochlorothiazide in addition to her metoprolol and also gave her a short small course of on lorazepam for sleep at night and on she is going to seek a therapist to support her with her stress      The following portions of the patient's history were reviewed and updated as appropriate:   She has no past medical history on file  ,  does not have any pertinent problems on file  ,   has a past surgical history that includes Cholecystectomy (1995); Hysterectomy; and Tooth extraction  ,  family history includes Alzheimer's disease in her mother; Heart disease in her father; Prostate cancer in her father  ,   reports that she has never smoked  She has never used smokeless tobacco  She reports that she drinks alcohol  She reports that she does not use drugs  ,  is allergic to codeine     Current Outpatient Medications   Medication Sig Dispense Refill    desvenlafaxine succinate (PRISTIQ) 50 mg 24 hr tablet TAKE 1 TABLET DAILY 90 tablet 1    fexofenadine (ALLEGRA) 180 MG tablet Take 1 tablet by mouth daily      metoprolol succinate (TOPROL-XL) 25 mg 24 hr tablet TAKE 1 TABLET DAILY 90 tablet 3    rosuvastatin (CRESTOR) 10 MG tablet Take 1 tablet (10 mg total) by mouth daily 90 tablet 1    Cholecalciferol (RA VITAMIN D-3) 2000 units CAPS Take by mouth daily      hydrochlorothiazide (HYDRODIURIL) 12 5 mg tablet Take 1 tablet (12 5 mg total) by mouth daily 30 tablet 5    LORazepam (ATIVAN) 0 5 mg tablet Take 1 tablet (0 5 mg total) by mouth daily at bedtime 15 tablet 0     No current facility-administered medications for this visit  Review of Systems   Constitutional: Negative for activity change, appetite change, diaphoresis, fatigue and fever  HENT: Negative  Eyes: Negative  Respiratory: Negative for apnea, cough, chest tightness, shortness of breath and wheezing  Cardiovascular: Negative for chest pain, palpitations and leg swelling  Gastrointestinal: Negative for abdominal distention, abdominal pain, anal bleeding, constipation, diarrhea, nausea and vomiting  Endocrine: Negative for cold intolerance, heat intolerance, polydipsia, polyphagia and polyuria  Genitourinary: Negative for difficulty urinating, dysuria, flank pain, hematuria and urgency  Musculoskeletal: Negative for arthralgias, back pain, gait problem, joint swelling and myalgias  Skin: Negative for color change, rash and wound  Allergic/Immunologic: Negative for environmental allergies, food allergies and immunocompromised state  Neurological: Negative for dizziness, seizures, syncope, speech difficulty, numbness and headaches  Hematological: Negative for adenopathy  Does not bruise/bleed easily  Psychiatric/Behavioral: Negative for agitation, behavioral problems, hallucinations, sleep disturbance and suicidal ideas  Objective:  Vitals:    08/14/19 1642   BP: 160/92   BP Location: Left arm   Patient Position: Sitting   Cuff Size: Standard   Weight: 88 kg (194 lb)   Height: 5' 8" (1 727 m)     Body mass index is 29 5 kg/m²       Physical Exam

## 2019-10-14 ENCOUNTER — TELEPHONE (OUTPATIENT)
Dept: FAMILY MEDICINE CLINIC | Facility: CLINIC | Age: 49
End: 2019-10-14

## 2019-10-14 VITALS — SYSTOLIC BLOOD PRESSURE: 132 MMHG | DIASTOLIC BLOOD PRESSURE: 84 MMHG

## 2019-12-25 DIAGNOSIS — E78.2 MIXED HYPERLIPIDEMIA: ICD-10-CM

## 2019-12-26 RX ORDER — ROSUVASTATIN CALCIUM 10 MG/1
TABLET, COATED ORAL
Qty: 90 TABLET | Refills: 4 | Status: SHIPPED | OUTPATIENT
Start: 2019-12-26 | End: 2021-01-04 | Stop reason: SDUPTHER

## 2020-01-11 DIAGNOSIS — F41.8 ANXIETY ASSOCIATED WITH DEPRESSION: ICD-10-CM

## 2020-01-13 RX ORDER — DESVENLAFAXINE 50 MG/1
TABLET, EXTENDED RELEASE ORAL
Qty: 90 TABLET | Refills: 0 | Status: SHIPPED | OUTPATIENT
Start: 2020-01-13 | End: 2020-04-13

## 2020-01-13 NOTE — TELEPHONE ENCOUNTER
Several care gaps please close up mammography and cancer cervical cancer screening and colorectal cancer screening make sure she has an appointment sometime this spring but let's close up the gaps even before that

## 2020-01-28 ENCOUNTER — OFFICE VISIT (OUTPATIENT)
Dept: FAMILY MEDICINE CLINIC | Facility: CLINIC | Age: 50
End: 2020-01-28
Payer: COMMERCIAL

## 2020-01-28 VITALS
DIASTOLIC BLOOD PRESSURE: 94 MMHG | SYSTOLIC BLOOD PRESSURE: 162 MMHG | HEIGHT: 68 IN | BODY MASS INDEX: 29.1 KG/M2 | WEIGHT: 192 LBS

## 2020-01-28 DIAGNOSIS — Z00.00 ANNUAL PHYSICAL EXAM: ICD-10-CM

## 2020-01-28 DIAGNOSIS — Z12.4 SCREENING FOR CERVICAL CANCER: ICD-10-CM

## 2020-01-28 DIAGNOSIS — Z12.31 ENCOUNTER FOR SCREENING MAMMOGRAM FOR BREAST CANCER: Primary | ICD-10-CM

## 2020-01-28 DIAGNOSIS — I10 ESSENTIAL HYPERTENSION: ICD-10-CM

## 2020-01-28 DIAGNOSIS — M19.90 ARTHRITIS: ICD-10-CM

## 2020-01-28 DIAGNOSIS — M17.0 PRIMARY OSTEOARTHRITIS OF BOTH KNEES: ICD-10-CM

## 2020-01-28 PROCEDURE — 3077F SYST BP >= 140 MM HG: CPT | Performed by: FAMILY MEDICINE

## 2020-01-28 PROCEDURE — 3008F BODY MASS INDEX DOCD: CPT | Performed by: FAMILY MEDICINE

## 2020-01-28 PROCEDURE — 3080F DIAST BP >= 90 MM HG: CPT | Performed by: FAMILY MEDICINE

## 2020-01-28 PROCEDURE — 99396 PREV VISIT EST AGE 40-64: CPT | Performed by: FAMILY MEDICINE

## 2020-01-28 RX ORDER — MULTIVITAMIN
1 TABLET ORAL DAILY
COMMUNITY

## 2020-01-28 RX ORDER — HYDROCHLOROTHIAZIDE 12.5 MG/1
12.5 TABLET ORAL DAILY
Qty: 30 TABLET | Refills: 5 | Status: SHIPPED | OUTPATIENT
Start: 2020-01-28 | End: 2021-10-20

## 2020-01-28 NOTE — PATIENT INSTRUCTIONS
Wellness Visit for Adults   AMBULATORY CARE:   A wellness visit  is when you see your healthcare provider to get screened for health problems  You can also get advice on how to stay healthy  Write down your questions so you remember to ask them  Ask your healthcare provider how often you should have a wellness visit  What happens at a wellness visit:  Your healthcare provider will ask about your health, and your family history of health problems  This includes high blood pressure, heart disease, and cancer  He or she will ask if you have symptoms that concern you, if you smoke, and about your mood  You may also be asked about your intake of medicines, supplements, food, and alcohol  Any of the following may be done:  · Your weight  will be checked  Your height may also be checked so your body mass index (BMI) can be calculated  Your BMI shows if you are at a healthy weight  · Your blood pressure  and heart rate will be checked  Your temperature may also be checked  · Blood and urine tests  may be done  Blood tests may be done to check your cholesterol levels  Abnormal cholesterol levels increase your risk for heart disease and stroke  You may also need a blood or urine test to check for diabetes if you are at increased risk  Urine tests may be done to look for signs of an infection or kidney disease  · A physical exam  includes checking your heartbeat and lungs with a stethoscope  Your healthcare provider may also check your skin to look for sun damage  · Screening tests  may be recommended  A screening test is done to check for diseases that may not cause symptoms  The screening tests you may need depend on your age, gender, family history, and lifestyle habits  For example, colorectal screening may be recommended if you are 48years old or older  Screening tests you need if you are a woman:   · A Pap smear  is used to screen for cervical cancer   Pap smears are usually done every 3 to 5 years depending on your age  You may need them more often if you have had abnormal Pap smear test results in the past  Ask your healthcare provider how often you should have a Pap smear  · A mammogram  is an x-ray of your breasts to screen for breast cancer  Experts recommend mammograms every 2 years starting at age 48 years  You may need a mammogram at age 52 years or younger if you have an increased risk for breast cancer  Talk to your healthcare provider about when you should start having mammograms and how often you need them  Vaccines you may need:   · Get an influenza vaccine  every year  The influenza vaccine protects you from the flu  Several types of viruses cause the flu  The viruses change over time, so new vaccines are made each year  · Get a tetanus-diphtheria (Td) booster vaccine  every 10 years  This vaccine protects you against tetanus and diphtheria  Tetanus is a severe infection that may cause painful muscle spasms and lockjaw  Diphtheria is a severe bacterial infection that causes a thick covering in the back of your mouth and throat  · Get a human papillomavirus (HPV) vaccine  if you are female and aged 23 to 32 or male 23 to 24 and never received it  This vaccine protects you from HPV infection  HPV is the most common infection spread by sexual contact  HPV may also cause vaginal, penile, and anal cancers  · Get a pneumococcal vaccine  if you are aged 72 years or older  The pneumococcal vaccine is an injection given to protect you from pneumococcal disease  Pneumococcal disease is an infection caused by pneumococcal bacteria  The infection may cause pneumonia, meningitis, or an ear infection  · Get a shingles vaccine  if you are aged 61 or older, even if you have had shingles before  The shingles vaccine is an injection to protect you from the varicella-zoster virus  This is the same virus that causes chickenpox   Shingles is a painful rash that develops in people who had chickenpox or have been exposed to the virus  How to eat healthy:  My Plate is a model for planning healthy meals  It shows the types and amounts of foods that should go on your plate  Fruits and vegetables make up about half of your plate, and grains and protein make up the other half  A serving of dairy is included on the side of your plate  The amount of calories and serving sizes you need depends on your age, gender, weight, and height  Examples of healthy foods are listed below:  · Eat a variety of vegetables  such as dark green, red, and orange vegetables  You can also include canned vegetables low in sodium (salt) and frozen vegetables without added butter or sauces  · Eat a variety of fresh fruits , canned fruit in 100% juice, frozen fruit, and dried fruit  · Include whole grains  At least half of the grains you eat should be whole grains  Examples include whole-wheat bread, wheat pasta, brown rice, and whole-grain cereals such as oatmeal     · Eat a variety of protein foods such as seafood (fish and shellfish), lean meat, and poultry without skin (turkey and chicken)  Examples of lean meats include pork leg, shoulder, or tenderloin, and beef round, sirloin, tenderloin, and extra lean ground beef  Other protein foods include eggs and egg substitutes, beans, peas, soy products, nuts, and seeds  · Choose low-fat dairy products such as skim or 1% milk or low-fat yogurt, cheese, and cottage cheese  · Limit unhealthy fats  such as butter, hard margarine, and shortening  Exercise:  Exercise at least 30 minutes per day on most days of the week  Some examples of exercise include walking, biking, dancing, and swimming  You can also fit in more physical activity by taking the stairs instead of the elevator or parking farther away from stores  Include muscle strengthening activities 2 days each week  Regular exercise provides many health benefits   It helps you manage your weight, and decreases your risk for type 2 diabetes, heart disease, stroke, and high blood pressure  Exercise can also help improve your mood  Ask your healthcare provider about the best exercise plan for you  General health and safety guidelines:   · Do not smoke  Nicotine and other chemicals in cigarettes and cigars can cause lung damage  Ask your healthcare provider for information if you currently smoke and need help to quit  E-cigarettes or smokeless tobacco still contain nicotine  Talk to your healthcare provider before you use these products  · Limit alcohol  A drink of alcohol is 12 ounces of beer, 5 ounces of wine, or 1½ ounces of liquor  · Lose weight, if needed  Being overweight increases your risk of certain health conditions  These include heart disease, high blood pressure, type 2 diabetes, and certain types of cancer  · Protect your skin  Do not sunbathe or use tanning beds  Use sunscreen with a SPF 15 or higher  Apply sunscreen at least 15 minutes before you go outside  Reapply sunscreen every 2 hours  Wear protective clothing, hats, and sunglasses when you are outside  · Drive safely  Always wear your seatbelt  Make sure everyone in your car wears a seatbelt  A seatbelt can save your life if you are in an accident  Do not use your cell phone when you are driving  This could distract you and cause an accident  Pull over if you need to make a call or send a text message  · Practice safe sex  Use latex condoms if are sexually active and have more than one partner  Your healthcare provider may recommend screening tests for sexually transmitted infections (STIs)  · Wear helmets, lifejackets, and protective gear  Always wear a helmet when you ride a bike or motorcycle, go skiing, or play sports that could cause a head injury  Wear protective equipment when you play sports  Wear a lifejacket when you are on a boat or doing water sports    © 2017 [employer] Information is for End User's use only and may not be sold, redistributed or otherwise used for commercial purposes  All illustrations and images included in CareNotes® are the copyrighted property of [employer]  or [employer]wick  The above information is an  only  It is not intended as medical advice for individual conditions or treatments  Talk to your doctor, nurse or pharmacist before following any medical regimen to see if it is safe and effective for you  Weight Management   AMBULATORY CARE:   Why it is important to manage your weight:  Being overweight increases your risk of health conditions such as heart disease, high blood pressure, type 2 diabetes, and certain types of cancer  It can also increase your risk for osteoarthritis, sleep apnea, and other respiratory problems  Aim for a slow, steady weight loss  Even a small amount of weight loss can lower your risk of health problems  How to lose weight safely:  A safe and healthy way to lose weight is to eat fewer calories and get regular exercise  You can lose up about 1 pound a week by decreasing the number of calories you eat by 500 calories each day  You can decrease calories by eating smaller portion sizes or by cutting out high-calorie foods  Read labels to find out how many calories are in the foods you eat  You can also burn calories with exercise such as walking, swimming, or biking  You will be more likely to keep weight off if you make these changes part of your lifestyle  Healthy meal plan for weight management:  A healthy meal plan includes a variety of foods, contains fewer calories, and helps you stay healthy  A healthy meal plan includes the following:  · Eat whole-grain foods more often  A healthy meal plan should contain fiber  Fiber is the part of grains, fruits, and vegetables that is not broken down by your body  Whole-grain foods are healthy and provide extra fiber in your diet   Some examples of whole-grain foods are whole-wheat breads and pastas, oatmeal, brown rice, and bulgur  · Eat a variety of vegetables every day  Include dark, leafy greens such as spinach, kale, izabella greens, and mustard greens  Eat yellow and orange vegetables such as carrots, sweet potatoes, and winter squash  · Eat a variety of fruits every day  Choose fresh or canned fruit (canned in its own juice or light syrup) instead of juice  Fruit juice has very little or no fiber  · Eat low-fat dairy foods  Drink fat-free (skim) milk or 1% milk  Eat fat-free yogurt and low-fat cottage cheese  Try low-fat cheeses such as mozzarella and other reduced-fat cheeses  · Choose meat and other protein foods that are low in fat  Choose beans or other legumes such as split peas or lentils  Choose fish, skinless poultry (chicken or turkey), or lean cuts of red meat (beef or pork)  Before you cook meat or poultry, cut off any visible fat  · Use less fat and oil  Try baking foods instead of frying them  Add less fat, such as margarine, sour cream, regular salad dressing and mayonnaise to foods  Eat fewer high-fat foods  Some examples of high-fat foods include french fries, doughnuts, ice cream, and cakes  · Eat fewer sweets  Limit foods and drinks that are high in sugar  This includes candy, cookies, regular soda, and sweetened drinks  Ways to decrease calories:   · Eat smaller portions  ¨ Use a small plate with smaller servings  ¨ Do not eat second helpings  ¨ When you eat at a restaurant, ask for a box and place half of your meal in the box before you eat  ¨ Share an entrée with someone else  · Replace high-calorie snacks with healthy, low-calorie snacks  ¨ Choose fresh fruit, vegetables, fat-free rice cakes, or air-popped popcorn instead of potato chips, nuts, or chocolate  ¨ Choose water or calorie-free drinks instead of soda or sweetened drinks  · Eat regular meals  Skipping meals can lead to overeating later in the day   Eat a healthy snack in place of a meal if you do not have time to eat a regular meal      · Do not shop for groceries when you are hungry  You may be more likely to make unhealthy food choices  Take a grocery list of healthy foods and shop after you have eaten  Exercise:  Exercise at least 30 minutes per day on most days of the week  Some examples of exercise include walking, biking, dancing, and swimming  You can also fit in more physical activity by taking the stairs instead of the elevator or parking farther away from stores  Ask your healthcare provider about the best exercise plan for you  Other things to consider as you try to lose weight:   · Be aware of situations that may give you the urge to overeat, such as eating while watching television  Find ways to avoid these situations  For example, read a book, go for a walk, or do crafts  · Meet with a weight loss support group or friends who are also trying to lose weight  This may help you stay motivated to continue working on your weight loss goals  © 2017 [employer] Information is for End User's use only and may not be sold, redistributed or otherwise used for commercial purposes  All illustrations and images included in CareNotes® are the copyrighted property of [employer]  or [employer] Chadwick  The above information is an  only  It is not intended as medical advice for individual conditions or treatments  Talk to your doctor, nurse or pharmacist before following any medical regimen to see if it is safe and effective for you

## 2020-01-28 NOTE — PROGRESS NOTES
ADULT ANNUAL [address] PRACTICE    NAME: Alexei Keys  AGE: 44 y o  SEX: female  : 1970     DATE: 2020     Assessment and Plan:     Problem List Items Addressed This Visit     None      Visit Diagnoses     Encounter for screening mammogram for breast cancer    -  Primary    Relevant Orders    Mammo screening bilateral w 3d & cad    Screening for cervical cancer        Relevant Orders    Ambulatory referral to Obstetrics / Gynecology          Immunizations and preventive care screenings were discussed with patient today  Appropriate education was printed on patient's after visit summary  Counseling:  Alcohol/drug use: discussed moderation in alcohol intake, the recommendations for healthy alcohol use, and avoidance of illicit drug use  Dental Health: discussed importance of regular tooth brushing, flossing, and dental visits  Injury prevention: discussed safety/seat belts, safety helmets, smoke detectors, carbon dioxide detectors, and smoking near bedding or upholstery  Sexual health: discussed sexually transmitted diseases, partner selection, use of condoms, avoidance of unintended pregnancy, and contraceptive alternatives  · Exercise: the importance of regular exercise/physical activity was discussed  Recommend exercise 3-5 times per week for at least 30 minutes  BMI Counseling: Body mass index is 29 19 kg/m²  The BMI is above normal  Nutrition recommendations include decreasing portion sizes, encouraging healthy choices of fruits and vegetables, decreasing fast food intake, consuming healthier snacks, moderation in carbohydrate intake and increasing intake of lean protein  Exercise recommendations include moderate physical activity 150 minutes/week and exercising 3-5 times per week  Patient referred to PCP due to patient being overweight  No follow-ups on file       Chief Complaint:     Chief Complaint   Patient presents with  Physical Exam     Well check      History of Present Illness:     Adult Annual Physical   Patient here for a comprehensive physical exam  The patient reports no problems  Diet and Physical Activity  · Diet/Nutrition: well balanced diet  · Exercise: walking  Depression Screening  PHQ-9 Depression Screening    PHQ-9:    Frequency of the following problems over the past two weeks:            General Health  · Sleep: gets 7-8 hours of sleep on average  · Hearing: normal - bilateral   · Vision: goes for regular eye exams  · Dental: regular dental visits  /GYN Health  · Patient is:  Post hysterectomy  · Last menstrual period:  2004  · Contraceptive method: Partial hysterectomy  Review of Systems:     Review of Systems   Constitutional: Positive for activity change and fatigue  Negative for appetite change, diaphoresis and fever  HENT: Negative  Negative for dental problem, hearing loss and tinnitus  Eyes: Positive for visual disturbance  Respiratory: Negative for apnea, cough, chest tightness, shortness of breath and wheezing  Cardiovascular: Negative for chest pain, palpitations and leg swelling  Gastrointestinal: Negative for abdominal distention, abdominal pain, anal bleeding, constipation, diarrhea, nausea and vomiting  Endocrine: Negative for cold intolerance, heat intolerance, polydipsia, polyphagia and polyuria  Genitourinary: Negative for difficulty urinating, dysuria, flank pain, hematuria and urgency  Very mild female stress incontinence   Musculoskeletal: Positive for arthralgias, back pain and joint swelling  Negative for gait problem and myalgias  Skin: Negative for color change, rash and wound  Allergic/Immunologic: Negative for environmental allergies, food allergies and immunocompromised state  Neurological: Negative for dizziness, seizures, syncope, speech difficulty, numbness and headaches  Hematological: Negative for adenopathy   Does not bruise/bleed easily  Psychiatric/Behavioral: Negative for agitation, behavioral problems, hallucinations, sleep disturbance and suicidal ideas  Past Medical History:     No past medical history on file     Past Surgical History:     Past Surgical History:   Procedure Laterality Date    CHOLECYSTECTOMY  1995    HYSTERECTOMY      TOOTH EXTRACTION        Social History:     Social History     Socioeconomic History    Marital status: /Civil Union     Spouse name: None    Number of children: None    Years of education: None    Highest education level: None   Occupational History    None   Social Needs    Financial resource strain: None    Food insecurity:     Worry: None     Inability: None    Transportation needs:     Medical: None     Non-medical: None   Tobacco Use    Smoking status: Never Smoker    Smokeless tobacco: Never Used   Substance and Sexual Activity    Alcohol use: Yes     Comment: social drinker    Drug use: No    Sexual activity: Yes     Partners: Male     Birth control/protection: None   Lifestyle    Physical activity:     Days per week: None     Minutes per session: None    Stress: None   Relationships    Social connections:     Talks on phone: None     Gets together: None     Attends Nondenominational service: None     Active member of club or organization: None     Attends meetings of clubs or organizations: None     Relationship status: None    Intimate partner violence:     Fear of current or ex partner: None     Emotionally abused: None     Physically abused: None     Forced sexual activity: None   Other Topics Concern    None   Social History Narrative    None      Family History:     Family History   Problem Relation Age of Onset    Alzheimer's disease Mother     Heart disease Father         cardiac disorder    Prostate cancer Father       Current Medications:     Current Outpatient Medications   Medication Sig Dispense Refill    Calcium-Vitamin D-Vitamin K (VIACTIV PO) Take by mouth daily      Cholecalciferol (RA VITAMIN D-3) 2000 units CAPS Take by mouth daily      desvenlafaxine succinate (PRISTIQ) 50 mg 24 hr tablet TAKE 1 TABLET DAILY 90 tablet 0    fexofenadine (ALLEGRA) 180 MG tablet Take 1 tablet by mouth daily      Glucosamine-Chondroitin (GLUCOSAMINE CHONDR COMPLEX PO) Take by mouth daily With Tumeric      metoprolol succinate (TOPROL-XL) 25 mg 24 hr tablet TAKE 1 TABLET DAILY 90 tablet 3    Multiple Vitamin (MULTIVITAMIN) tablet Take 1 tablet by mouth daily      rosuvastatin (CRESTOR) 10 MG tablet TAKE 1 TABLET DAILY 90 tablet 4    hydrochlorothiazide (HYDRODIURIL) 12 5 mg tablet Take 1 tablet (12 5 mg total) by mouth daily (Patient not taking: Reported on 1/28/2020) 30 tablet 5    LORazepam (ATIVAN) 0 5 mg tablet Take 1 tablet (0 5 mg total) by mouth daily at bedtime (Patient not taking: Reported on 1/28/2020) 15 tablet 0     No current facility-administered medications for this visit  Allergies: Allergies   Allergen Reactions    Codeine Other (See Comments)     "throat swells"      Physical Exam:     /94 (BP Location: Left arm, Patient Position: Sitting, Cuff Size: Standard)   Ht 5' 8" (1 727 m)   Wt 87 1 kg (192 lb)   BMI 29 19 kg/m²     Physical Exam   Constitutional: She is oriented to person, place, and time  She appears well-developed and well-nourished  No distress  HENT:   Head: Normocephalic  Right Ear: External ear normal    Left Ear: External ear normal    Nose: Nose normal    Mouth/Throat: Oropharynx is clear and moist    Eyes: Pupils are equal, round, and reactive to light  Conjunctivae and EOM are normal  Right eye exhibits no discharge  Left eye exhibits no discharge  No scleral icterus  Neck: Normal range of motion  No tracheal deviation present  No thyromegaly present  Cardiovascular: Normal rate, regular rhythm and normal heart sounds  Exam reveals no gallop and no friction rub     No murmur heard   Pulmonary/Chest: Effort normal and breath sounds normal  No respiratory distress  She has no wheezes  Abdominal: Soft  Bowel sounds are normal  She exhibits no mass  There is no tenderness  There is no guarding  Musculoskeletal: She exhibits no edema or deformity  Lymphadenopathy:     She has no cervical adenopathy  Neurological: She is alert and oriented to person, place, and time  No cranial nerve deficit  Skin: Skin is warm and dry  No rash noted  She is not diaphoretic  No erythema  Psychiatric: She has a normal mood and affect   Thought content normal        Katia Alvarado DO  [address] [facility]

## 2020-02-17 ENCOUNTER — APPOINTMENT (OUTPATIENT)
Dept: RADIOLOGY | Facility: MEDICAL CENTER | Age: 50
End: 2020-02-17
Payer: COMMERCIAL

## 2020-02-17 ENCOUNTER — LAB (OUTPATIENT)
Dept: LAB | Facility: MEDICAL CENTER | Age: 50
End: 2020-02-17
Payer: COMMERCIAL

## 2020-02-17 DIAGNOSIS — M19.90 ARTHRITIS: ICD-10-CM

## 2020-02-17 DIAGNOSIS — M17.0 PRIMARY OSTEOARTHRITIS OF BOTH KNEES: ICD-10-CM

## 2020-02-17 LAB — CRP SERPL QL: <3 MG/L

## 2020-02-17 PROCEDURE — 86200 CCP ANTIBODY: CPT | Performed by: FAMILY MEDICINE

## 2020-02-17 PROCEDURE — 86430 RHEUMATOID FACTOR TEST QUAL: CPT | Performed by: FAMILY MEDICINE

## 2020-02-17 PROCEDURE — 36415 COLL VENOUS BLD VENIPUNCTURE: CPT | Performed by: FAMILY MEDICINE

## 2020-02-17 PROCEDURE — 86140 C-REACTIVE PROTEIN: CPT

## 2020-02-17 PROCEDURE — 73562 X-RAY EXAM OF KNEE 3: CPT

## 2020-02-17 PROCEDURE — 86038 ANTINUCLEAR ANTIBODIES: CPT

## 2020-02-18 ENCOUNTER — HOSPITAL ENCOUNTER (OUTPATIENT)
Dept: MAMMOGRAPHY | Facility: HOSPITAL | Age: 50
Discharge: HOME | End: 2020-02-18
Attending: FAMILY MEDICINE
Payer: COMMERCIAL

## 2020-02-18 VITALS — WEIGHT: 192 LBS | BODY MASS INDEX: 29.1 KG/M2 | HEIGHT: 68 IN

## 2020-02-18 DIAGNOSIS — Z12.31 ENCOUNTER FOR SCREENING MAMMOGRAM FOR BREAST CANCER: ICD-10-CM

## 2020-02-18 LAB — RHEUMATOID FACT SER QL LA: NEGATIVE

## 2020-02-18 PROCEDURE — 77063 BREAST TOMOSYNTHESIS BI: CPT

## 2020-02-18 PROCEDURE — 77067 SCR MAMMO BI INCL CAD: CPT

## 2020-02-18 NOTE — RESULT ENCOUNTER NOTE
Call patient to notify normal results blood test for rheumatoid arthritis was negative still waiting for the screen offer lupus

## 2020-02-19 LAB
CCP IGA+IGG SERPL IA-ACNC: 7 UNITS (ref 0–19)
RYE IGE QN: NEGATIVE

## 2020-02-20 ENCOUNTER — TELEPHONE (OUTPATIENT)
Dept: FAMILY MEDICINE CLINIC | Facility: CLINIC | Age: 50
End: 2020-02-20

## 2020-02-20 DIAGNOSIS — Z12.11 SCREENING FOR COLON CANCER: Primary | ICD-10-CM

## 2020-02-20 NOTE — RESULT ENCOUNTER NOTE
Please call the patient regarding her abnormal result    Mammogram looks good nothing that obviously looks like cancer however she had a little asymmetry in her left breast and they are going to call her back for some additional images she has very extremely dense young breast tissue so that makes it very difficult for the radiologist to read the mammogram so nothing to be concerned about just go get the additional views make sure patient gets her Pap smear and also I think we gave her a fecal occult blood for her colorectal screening or perhaps even an appointment for colonoscopy

## 2020-02-20 NOTE — TELEPHONE ENCOUNTER
bp today 148/94  Sat approx 10 minutes, went up to 168/94    She was upset today, she has to get a breast biopsy, due to abnormal findings on her mammogram

## 2020-02-21 ENCOUNTER — TELEPHONE (OUTPATIENT)
Dept: GASTROENTEROLOGY | Facility: CLINIC | Age: 50
End: 2020-02-21

## 2020-02-21 ENCOUNTER — PREP FOR PROCEDURE (OUTPATIENT)
Dept: SURGERY | Facility: CLINIC | Age: 50
End: 2020-02-21

## 2020-02-21 DIAGNOSIS — Z12.11 SCREENING FOR COLON CANCER: Primary | ICD-10-CM

## 2020-02-21 DIAGNOSIS — I10 ESSENTIAL HYPERTENSION: ICD-10-CM

## 2020-02-21 RX ORDER — METOPROLOL SUCCINATE 50 MG/1
50 TABLET, EXTENDED RELEASE ORAL DAILY
Start: 2020-02-21 | End: 2020-03-19 | Stop reason: SDUPTHER

## 2020-02-21 NOTE — TELEPHONE ENCOUNTER
So I would increase to 2 metoprolol is a day instead of 1 she can take them both the same time they actually do make a 50 mg tablet but she can use upper 25 so that we can see if her blood pressure comes down and then of course report blood pressures next week that is not a big dose some people take up to 200 mg a day with regards to her breast biopsy tell her not to worry and if there is any issue with the biopsy we have an absolutely wonderful female dedicated breast surgeon by the name of Woo Benitez at [facility] who everybody absolutely loves so if there is any issue we can get her to the right person but I am sure will be fine explained to her that she has very dense and very young breast tissue so it just creates all kinds of issues imaging her breasts because her breasts or so dense a small percentage of women have this problem and it just makes it real difficult to read there mammograms tell her not to worry all will be well

## 2020-02-21 NOTE — TELEPHONE ENCOUNTER
PASSED OA-Scheduled patient 4/24/2020 with Dr Celso Nguyen  Procedure/suprep directionssent in mail patient prefers to contact our office to go over when she receives them in the mail  Suprep must be sent to [facility]  02/21/20  Screened by: Samson Castellano MA    Referring Provider: Dr Naima Tovar: If patient answers NO to medical questions, then schedule procedure  If patient answers YES to medical questions, then schedule office appointment  Previous Colonoscopy no  Date and Facility of last colonoscopy? NO    Comments: No        Pre- Screening: There is no height or weight on file to calculate BMI  Has patient been referred for a routine screening Colonoscopy? no  Is the patient between 39-70 years old? yes    SCHEDULING STAFF   If the patient is between 45yrs-49yrs, please advise patient to confirm benefits/coverage with their insurance company for a routine screening colonoscopy, some insurance carriers will only cover at [phone] or older   If the patient is over 76years old, please schedule an office visit   If the patient had a previous colonoscopy send to the procedure  before continuing    Have you been diagnosed with a bleeding disorder or anemia? no    Do you take NO    Have you been diagnosed with Diabetes or are you taking any   Diabetic medications? no    Do you have any of the following symptoms? No symptoms    Have you had a coronary or vascular stent within the last year? no    Have you had a heart attack or stroke in the last 6 months? no    Have you had intestinal surgery in the last 3 months? no    Do you have problems with: No    Do you use: Oxygen    Have you been hospitalized in the last Month? no    Have you had chest pain (angina) or breathing problems  (COPD) in the last 3 months? no    Do you have any difficulty walking up a flight of stairs? no    Have you had Kidney failure or insufficiency? no    Have you had heart valve surgery?  no    Are you confined to a wheelchair? no        ----- Message from Darrelyn Rinne sent at 2/20/2020  2:14 PM EST -----  SCREENING

## 2020-02-21 NOTE — TELEPHONE ENCOUNTER
Patient given the message, she wanted me to tell you she absolutely  loves you and you can not retire

## 2020-02-24 ENCOUNTER — TELEPHONE (OUTPATIENT)
Dept: FAMILY MEDICINE CLINIC | Facility: CLINIC | Age: 50
End: 2020-02-24

## 2020-02-24 NOTE — TELEPHONE ENCOUNTER
DO YOU WANT  HER TO CONTINUE THE WATER PILL WHEN SHE IS DONE IN FIVE DAYS  IF SO SHE NEEDS RX TO [facility] FOR #90 DAY

## 2020-02-24 NOTE — RESULT ENCOUNTER NOTE
Please call the patient regarding her abnormal result    A trace of fluid is seen in the right knee and some arthritic degenerative changes on the inside of the left knee

## 2020-02-27 ENCOUNTER — HOSPITAL ENCOUNTER (OUTPATIENT)
Dept: MAMMOGRAPHY | Facility: HOSPITAL | Age: 50
Discharge: HOME | End: 2020-02-27
Attending: FAMILY MEDICINE
Payer: COMMERCIAL

## 2020-02-27 ENCOUNTER — HOSPITAL ENCOUNTER (OUTPATIENT)
Dept: ULTRASOUND IMAGING | Facility: HOSPITAL | Age: 50
Discharge: HOME | End: 2020-02-27
Attending: FAMILY MEDICINE
Payer: COMMERCIAL

## 2020-02-27 VITALS — BODY MASS INDEX: 29.1 KG/M2 | HEIGHT: 68 IN | WEIGHT: 192 LBS

## 2020-02-27 DIAGNOSIS — R92.8 ABNORMAL MAMMOGRAM: ICD-10-CM

## 2020-02-27 PROCEDURE — 76642 ULTRASOUND BREAST LIMITED: CPT

## 2020-02-27 PROCEDURE — G0279 TOMOSYNTHESIS, MAMMO: HCPCS

## 2020-02-27 PROCEDURE — 77065 DX MAMMO INCL CAD UNI: CPT

## 2020-02-28 NOTE — RESULT ENCOUNTER NOTE
Please call the patient regarding her abnormal result    Call patient her repeat views of the breast show no evidence of malignancy she has got cysts she does have some small calcifications in the left breast which look exactly the same as they were in 2015 so although she has cystic breasts there is nothing on her mammogram that looks suspicious for breast cancer she can go back to her yearly mammogram schedule no need for any further studies

## 2020-03-19 DIAGNOSIS — I10 ESSENTIAL HYPERTENSION: ICD-10-CM

## 2020-03-19 RX ORDER — METOPROLOL SUCCINATE 50 MG/1
50 TABLET, EXTENDED RELEASE ORAL DAILY
Qty: 90 TABLET | Refills: 1 | Status: SHIPPED | OUTPATIENT
Start: 2020-03-19 | End: 2020-08-28

## 2020-03-19 RX ORDER — METOPROLOL SUCCINATE 25 MG/1
TABLET, EXTENDED RELEASE ORAL
Qty: 90 TABLET | Refills: 3 | OUTPATIENT
Start: 2020-03-19

## 2020-03-19 NOTE — TELEPHONE ENCOUNTER
Looks like we refilled this on February 21st make sure patient really does need this prescription this is an automated prescription refill

## 2020-04-03 ENCOUNTER — TELEPHONE (OUTPATIENT)
Dept: GASTROENTEROLOGY | Facility: CLINIC | Age: 50
End: 2020-04-03

## 2020-04-12 DIAGNOSIS — F41.8 ANXIETY ASSOCIATED WITH DEPRESSION: ICD-10-CM

## 2020-04-13 RX ORDER — DESVENLAFAXINE 50 MG/1
TABLET, EXTENDED RELEASE ORAL
Qty: 90 TABLET | Refills: 3 | Status: SHIPPED | OUTPATIENT
Start: 2020-04-13 | End: 2021-04-07

## 2020-05-21 ENCOUNTER — TELEPHONE (OUTPATIENT)
Dept: GASTROENTEROLOGY | Facility: CLINIC | Age: 50
End: 2020-05-21

## 2020-08-28 DIAGNOSIS — I10 ESSENTIAL HYPERTENSION: ICD-10-CM

## 2020-08-28 RX ORDER — METOPROLOL SUCCINATE 50 MG/1
TABLET, EXTENDED RELEASE ORAL
Qty: 90 TABLET | Refills: 3 | Status: SHIPPED | OUTPATIENT
Start: 2020-08-28 | End: 2021-08-02

## 2020-10-07 ENCOUNTER — OFFICE VISIT (OUTPATIENT)
Dept: FAMILY MEDICINE CLINIC | Facility: CLINIC | Age: 50
End: 2020-10-07
Payer: COMMERCIAL

## 2020-10-07 VITALS
BODY MASS INDEX: 30.19 KG/M2 | TEMPERATURE: 97.6 F | DIASTOLIC BLOOD PRESSURE: 94 MMHG | WEIGHT: 199.2 LBS | OXYGEN SATURATION: 98 % | SYSTOLIC BLOOD PRESSURE: 142 MMHG | HEART RATE: 79 BPM | HEIGHT: 68 IN

## 2020-10-07 DIAGNOSIS — Z23 NEED FOR INFLUENZA VACCINATION: ICD-10-CM

## 2020-10-07 DIAGNOSIS — Z12.4 ENCOUNTER FOR SCREENING FOR CERVICAL CANCER: ICD-10-CM

## 2020-10-07 DIAGNOSIS — H10.31 ACUTE BACTERIAL CONJUNCTIVITIS OF RIGHT EYE: Primary | ICD-10-CM

## 2020-10-07 PROCEDURE — 90471 IMMUNIZATION ADMIN: CPT

## 2020-10-07 PROCEDURE — 99213 OFFICE O/P EST LOW 20 MIN: CPT | Performed by: PHYSICIAN ASSISTANT

## 2020-10-07 PROCEDURE — 90682 RIV4 VACC RECOMBINANT DNA IM: CPT

## 2020-10-07 PROCEDURE — 3080F DIAST BP >= 90 MM HG: CPT | Performed by: PHYSICIAN ASSISTANT

## 2020-10-07 PROCEDURE — 1036F TOBACCO NON-USER: CPT | Performed by: PHYSICIAN ASSISTANT

## 2020-10-07 PROCEDURE — 3725F SCREEN DEPRESSION PERFORMED: CPT | Performed by: PHYSICIAN ASSISTANT

## 2020-10-07 RX ORDER — CETIRIZINE HYDROCHLORIDE 10 MG/1
10 TABLET, CHEWABLE ORAL DAILY
COMMUNITY

## 2020-10-07 RX ORDER — OFLOXACIN 3 MG/ML
2 SOLUTION/ DROPS OPHTHALMIC 4 TIMES DAILY
Qty: 5 ML | Refills: 0 | Status: SHIPPED | OUTPATIENT
Start: 2020-10-07 | End: 2020-10-14

## 2020-10-30 ENCOUNTER — TELEPHONE (OUTPATIENT)
Dept: GYNECOLOGY | Facility: CLINIC | Age: 50
End: 2020-10-30

## 2020-11-25 DIAGNOSIS — Z12.31 SCREENING MAMMOGRAM, ENCOUNTER FOR: Primary | ICD-10-CM

## 2021-01-04 DIAGNOSIS — E78.2 MIXED HYPERLIPIDEMIA: ICD-10-CM

## 2021-01-04 RX ORDER — ROSUVASTATIN CALCIUM 10 MG/1
10 TABLET, COATED ORAL DAILY
Qty: 90 TABLET | Refills: 3 | Status: SHIPPED | OUTPATIENT
Start: 2021-01-04 | End: 2022-01-28

## 2021-01-15 NOTE — PROGRESS NOTES
Assessment/Plan:    Recommended monthly SBE, annual CBE and annual screening mammo  ASCCP guidelines reviewed and pap obtained  Colonoscopy will be scheduled  The patient denies STI risk factors and declines testing at this time  Reviewed diet/activity recommendations Calcium 8896-2644 mg and Vit D 600-1000 IU daily  Discussed postmenopausal considerations and symptoms to report  Kegel exercises as instructed  RTO in one year for routine annual gyn exam or sooner PRN  Diagnoses and all orders for this visit:            Subjective:      Patient ID: Rand Camarena is a 44 y o  female  This patient presents for routine annual gyn exam  New to our practice  Hx of hyst for AUB, patient states she was told pathology was precancerous and was advised to continue paps  She denies vaginal bleeding or spotting, VM sx, pelvic pain, dyspareunia, breast concerns, abnormal discharge, bowel/bladder dysfunction, depression/anx  , sexually active and is monogamous  Denies STI concerns  No hx of STIs  Pap/HPV up to date and normal, 2016  Mammography up to date with normal diagnostic mammo and ultrasound done of left breast secondary to abnormal mammogram  Recommendation to return to annual screening  Colonoscopy not done to date secondary to COVID, patient will schedule  The following portions of the patient's history were reviewed and updated as appropriate: allergies, current medications, past family history, past medical history, past social history, past surgical history and problem list     Review of Systems   Constitutional: Negative  Respiratory: Negative  Cardiovascular: Negative  Gastrointestinal: Negative  Endocrine: Negative  Genitourinary: Negative for dyspareunia, dysuria, frequency, pelvic pain, urgency, vaginal bleeding, vaginal discharge and vaginal pain  Musculoskeletal: Negative  Skin: Negative  Neurological: Negative  Psychiatric/Behavioral: Negative  Objective:      /90   Pulse 89   Ht 5' 7" (1 702 m)   Wt 91 4 kg (201 lb 9 6 oz)   BMI 31 58 kg/m²          Physical Exam  Vitals signs and nursing note reviewed  Exam conducted with a chaperone present  Constitutional:       Appearance: Normal appearance  She is well-developed  HENT:      Head: Normocephalic and atraumatic  Neck:      Musculoskeletal: Normal range of motion and neck supple  Thyroid: No thyroid mass or thyromegaly  Cardiovascular:      Rate and Rhythm: Normal rate and regular rhythm  Heart sounds: Normal heart sounds  Pulmonary:      Effort: Pulmonary effort is normal       Breath sounds: Normal breath sounds  Chest:      Breasts: Breasts are symmetrical          Right: No inverted nipple, mass, nipple discharge, skin change or tenderness  Left: No inverted nipple, mass, nipple discharge, skin change or tenderness  Abdominal:      General: Bowel sounds are normal       Palpations: Abdomen is soft  Tenderness: There is no abdominal tenderness  Hernia: There is no hernia in the left inguinal area or right inguinal area  Genitourinary:     General: Normal vulva  Exam position: Supine  Pubic Area: No rash  Labia:         Right: No rash, tenderness, lesion or injury  Left: No rash, tenderness, lesion or injury  Urethra: No prolapse, urethral pain, urethral swelling or urethral lesion  Vagina: Normal  No signs of injury and foreign body  No vaginal discharge, erythema, tenderness, bleeding, lesions or prolapsed vaginal walls  Adnexa:         Right: No mass, tenderness or fullness  Left: No mass, tenderness or fullness  Rectum: No external hemorrhoid  Comments: Urethra normal without lesions  No bladder tenderness  Cervix, uterus absent, no masses or tenderness on BME  Musculoskeletal: Normal range of motion  Lymphadenopathy:      Lower Body: No right inguinal adenopathy   No left inguinal adenopathy  Skin:     General: Skin is warm and dry  Neurological:      Mental Status: She is alert and oriented to person, place, and time  Psychiatric:         Speech: Speech normal          Behavior: Behavior normal  Behavior is cooperative

## 2021-01-19 ENCOUNTER — ANNUAL EXAM (OUTPATIENT)
Dept: GYNECOLOGY | Facility: CLINIC | Age: 51
End: 2021-01-19
Payer: COMMERCIAL

## 2021-01-19 VITALS
WEIGHT: 201.6 LBS | HEIGHT: 67 IN | HEART RATE: 89 BPM | BODY MASS INDEX: 31.64 KG/M2 | DIASTOLIC BLOOD PRESSURE: 90 MMHG | SYSTOLIC BLOOD PRESSURE: 130 MMHG

## 2021-01-19 DIAGNOSIS — Z12.4 ENCOUNTER FOR PAPANICOLAOU SMEAR FOR CERVICAL CANCER SCREENING: ICD-10-CM

## 2021-01-19 DIAGNOSIS — Z01.419 ENCOUNTER FOR GYNECOLOGICAL EXAMINATION (GENERAL) (ROUTINE) WITHOUT ABNORMAL FINDINGS: Primary | ICD-10-CM

## 2021-01-19 DIAGNOSIS — Z12.31 SCREENING MAMMOGRAM, ENCOUNTER FOR: ICD-10-CM

## 2021-01-19 PROCEDURE — 87624 HPV HI-RISK TYP POOLED RSLT: CPT | Performed by: OBSTETRICS & GYNECOLOGY

## 2021-01-19 PROCEDURE — S0612 ANNUAL GYNECOLOGICAL EXAMINA: HCPCS | Performed by: OBSTETRICS & GYNECOLOGY

## 2021-01-19 PROCEDURE — G0145 SCR C/V CYTO,THINLAYER,RESCR: HCPCS | Performed by: OBSTETRICS & GYNECOLOGY

## 2021-01-23 LAB
HPV HR 12 DNA CVX QL NAA+PROBE: NEGATIVE
HPV16 DNA CVX QL NAA+PROBE: NEGATIVE
HPV18 DNA CVX QL NAA+PROBE: NEGATIVE
LAB AP GYN PRIMARY INTERPRETATION: NORMAL
Lab: NORMAL

## 2021-02-09 ENCOUNTER — OFFICE VISIT (OUTPATIENT)
Dept: FAMILY MEDICINE CLINIC | Facility: CLINIC | Age: 51
End: 2021-02-09
Payer: COMMERCIAL

## 2021-02-09 ENCOUNTER — LAB (OUTPATIENT)
Dept: LAB | Facility: MEDICAL CENTER | Age: 51
End: 2021-02-09
Payer: COMMERCIAL

## 2021-02-09 VITALS
HEIGHT: 67 IN | TEMPERATURE: 94.5 F | WEIGHT: 204 LBS | DIASTOLIC BLOOD PRESSURE: 76 MMHG | BODY MASS INDEX: 32.02 KG/M2 | SYSTOLIC BLOOD PRESSURE: 120 MMHG

## 2021-02-09 DIAGNOSIS — Z13.220 SCREENING FOR HYPERLIPIDEMIA: ICD-10-CM

## 2021-02-09 DIAGNOSIS — Z13.29 SCREENING FOR THYROID DISORDER: ICD-10-CM

## 2021-02-09 DIAGNOSIS — Z00.00 ANNUAL PHYSICAL EXAM: Primary | ICD-10-CM

## 2021-02-09 DIAGNOSIS — Z00.00 ANNUAL PHYSICAL EXAM: ICD-10-CM

## 2021-02-09 DIAGNOSIS — Z13.228 SCREENING FOR METABOLIC DISORDER: ICD-10-CM

## 2021-02-09 DIAGNOSIS — Z12.31 ENCOUNTER FOR SCREENING MAMMOGRAM FOR BREAST CANCER: ICD-10-CM

## 2021-02-09 LAB
ALBUMIN SERPL BCP-MCNC: 3.9 G/DL (ref 3.5–5)
ALP SERPL-CCNC: 89 U/L (ref 46–116)
ALT SERPL W P-5'-P-CCNC: 31 U/L (ref 12–78)
ANION GAP SERPL CALCULATED.3IONS-SCNC: 6 MMOL/L (ref 4–13)
AST SERPL W P-5'-P-CCNC: 17 U/L (ref 5–45)
BILIRUB SERPL-MCNC: 0.82 MG/DL (ref 0.2–1)
BUN SERPL-MCNC: 20 MG/DL (ref 5–25)
CALCIUM SERPL-MCNC: 9.7 MG/DL (ref 8.3–10.1)
CHLORIDE SERPL-SCNC: 110 MMOL/L (ref 100–108)
CHOLEST SERPL-MCNC: 203 MG/DL (ref 50–200)
CO2 SERPL-SCNC: 27 MMOL/L (ref 21–32)
CREAT SERPL-MCNC: 0.7 MG/DL (ref 0.6–1.3)
GFR SERPL CREATININE-BSD FRML MDRD: 100 ML/MIN/1.73SQ M
GLUCOSE P FAST SERPL-MCNC: 98 MG/DL (ref 65–99)
HDLC SERPL-MCNC: 58 MG/DL
LDLC SERPL CALC-MCNC: 114 MG/DL (ref 0–100)
NONHDLC SERPL-MCNC: 145 MG/DL
POTASSIUM SERPL-SCNC: 3.9 MMOL/L (ref 3.5–5.3)
PROT SERPL-MCNC: 7.5 G/DL (ref 6.4–8.2)
SODIUM SERPL-SCNC: 143 MMOL/L (ref 136–145)
TRIGL SERPL-MCNC: 153 MG/DL
TSH SERPL DL<=0.05 MIU/L-ACNC: 1.93 UIU/ML (ref 0.36–3.74)

## 2021-02-09 PROCEDURE — 84443 ASSAY THYROID STIM HORMONE: CPT

## 2021-02-09 PROCEDURE — 99396 PREV VISIT EST AGE 40-64: CPT | Performed by: FAMILY MEDICINE

## 2021-02-09 PROCEDURE — 36415 COLL VENOUS BLD VENIPUNCTURE: CPT

## 2021-02-09 PROCEDURE — 80053 COMPREHEN METABOLIC PANEL: CPT

## 2021-02-09 PROCEDURE — 80061 LIPID PANEL: CPT

## 2021-02-09 NOTE — PROGRESS NOTES
ADULT ANNUAL [facility]     NAME: Marcial Reading  AGE: 44 y o  SEX: female  : 1970     DATE: 2021     Assessment and Plan:     Problem List Items Addressed This Visit     None          Immunizations and preventive care screenings were discussed with patient today  Appropriate education was printed on patient's after visit summary  Counseling:  Alcohol/drug use: discussed moderation in alcohol intake, the recommendations for healthy alcohol use, and avoidance of illicit drug use  Dental Health: discussed importance of regular tooth brushing, flossing, and dental visits  Injury prevention: discussed safety/seat belts, safety helmets, smoke detectors, carbon dioxide detectors, and smoking near bedding or upholstery  Sexual health: discussed sexually transmitted diseases, partner selection, use of condoms, avoidance of unintended pregnancy, and contraceptive alternatives  · Exercise: the importance of regular exercise/physical activity was discussed  Recommend exercise 3-5 times per week for at least 30 minutes  No follow-ups on file  Chief Complaint:     Chief Complaint   Patient presents with    Physical Exam     annual      History of Present Illness:     Adult Annual Physical   Patient here for a comprehensive physical exam  The patient reports no problems  Diet and Physical Activity  · Diet/Nutrition: well balanced diet  · Exercise: walking  Depression Screening  PHQ-9 Depression Screening    PHQ-9:   Frequency of the following problems over the past two weeks:           General Health  · Sleep: gets 4-6 hours of sleep on average  · Hearing: normal - bilateral   · Vision: wears glasses  · Dental: regular dental visits  /GYN Health  · Patient is:  Status post partial hysterectomy  · Last menstrual period:    · Contraceptive method: Hysterectomy       Review of Systems:     Review of Systems Constitutional: Negative for activity change, appetite change, diaphoresis, fatigue and fever  HENT: Negative  Negative for dental problem and hearing loss  Eyes: Positive for visual disturbance  Corrective lenses with no other issues with her eyes   Respiratory: Negative for apnea, cough, chest tightness, shortness of breath and wheezing  Cardiovascular: Negative for chest pain, palpitations and leg swelling  Gastrointestinal: Negative for abdominal distention, abdominal pain, anal bleeding, constipation, diarrhea, nausea and vomiting  Endocrine: Negative for cold intolerance, heat intolerance, polydipsia, polyphagia and polyuria  Genitourinary: Negative for difficulty urinating, dysuria, flank pain, hematuria and urgency  Mild female stress incontinence   Musculoskeletal: Positive for back pain and joint swelling  Negative for arthralgias, gait problem and myalgias  Skin: Negative for color change, rash and wound  Allergic/Immunologic: Negative for environmental allergies, food allergies and immunocompromised state  Neurological: Positive for headaches  Negative for dizziness, seizures, syncope, speech difficulty and numbness  Patient gets migraines did secondary to stress uses Excedrin migraine with good relief   Hematological: Negative for adenopathy  Does not bruise/bleed easily  Psychiatric/Behavioral: Negative for agitation, behavioral problems, hallucinations, sleep disturbance and suicidal ideas  The patient is nervous/anxious  Past Medical History:     No past medical history on file     Past Surgical History:     Past Surgical History:   Procedure Laterality Date    BREAST BIOPSY Left 2009    core bx; benign     CHOLECYSTECTOMY  1995    HYSTERECTOMY      age 26    TOOTH EXTRACTION        Social History:     E-Cigarette/Vaping    E-Cigarette Use Never User      E-Cigarette/Vaping Substances    Nicotine No     THC No     CBD No     Flavoring No  Other No     Unknown No      Social History     Socioeconomic History    Marital status: /Civil Union     Spouse name: None    Number of children: None    Years of education: None    Highest education level: None   Occupational History    None   Social Needs    Financial resource strain: None    Food insecurity     Worry: None     Inability: None    Transportation needs     Medical: None     Non-medical: None   Tobacco Use    Smoking status: Never Smoker    Smokeless tobacco: Never Used   Substance and Sexual Activity    Alcohol use: Yes     Comment: social drinker    Drug use: No    Sexual activity: Yes     Partners: Male     Birth control/protection: None     Comment: hysterectomy   Lifestyle    Physical activity     Days per week: None     Minutes per session: None    Stress: None   Relationships    Social connections     Talks on phone: None     Gets together: None     Attends Yarsani service: None     Active member of club or organization: None     Attends meetings of clubs or organizations: None     Relationship status: None    Intimate partner violence     Fear of current or ex partner: None     Emotionally abused: None     Physically abused: None     Forced sexual activity: None   Other Topics Concern    None   Social History Narrative    None      Family History:     Family History   Problem Relation Age of Onset    Alzheimer's disease Mother     Lung cancer Mother     Heart disease Father         cardiac disorder    Prostate cancer Father     No Known Problems Sister     No Known Problems Daughter     Tongue cancer Maternal Grandmother     Lung cancer Maternal Grandfather     No Known Problems Paternal Grandmother     Lung cancer Paternal Grandfather     Emphysema Maternal Aunt     No Known Problems Sister     No Known Problems Sister     Breast cancer Cousin 45      Current Medications:     Current Outpatient Medications   Medication Sig Dispense Refill    Calcium-Vitamin D-Vitamin K (VIACTIV PO) Take by mouth daily      cetirizine (ZyrTEC) 10 MG chewable tablet Chew 10 mg daily      Cholecalciferol (RA VITAMIN D-3) 2000 units CAPS Take by mouth daily      desvenlafaxine succinate (PRISTIQ) 50 mg 24 hr tablet TAKE 1 TABLET DAILY 90 tablet 3    fexofenadine (ALLEGRA) 180 MG tablet Take 1 tablet by mouth daily      Glucosamine-Chondroitin (GLUCOSAMINE CHONDR COMPLEX PO) Take by mouth daily With Tumeric      hydrochlorothiazide (HYDRODIURIL) 12 5 mg tablet Take 1 tablet (12 5 mg total) by mouth daily 30 tablet 5    LORazepam (ATIVAN) 0 5 mg tablet Take 1 tablet (0 5 mg total) by mouth daily at bedtime 15 tablet 0    metoprolol succinate (TOPROL-XL) 50 mg 24 hr tablet TAKE 1 TABLET DAILY 90 tablet 3    Multiple Vitamin (MULTIVITAMIN) tablet Take 1 tablet by mouth daily      rosuvastatin (CRESTOR) 10 MG tablet Take 1 tablet (10 mg total) by mouth daily 90 tablet 3    Na Sulfate-K Sulfate-Mg Sulf (Suprep Bowel Prep Kit) 17 5-3 13-1 6 GM/177ML SOLN Take as directed by the office  (Patient not taking: Reported on 2/9/2021) 2 Bottle 0     No current facility-administered medications for this visit  Allergies: Allergies   Allergen Reactions    Codeine Other (See Comments)     "throat swells"      Physical Exam:     /90 (BP Location: Left arm, Patient Position: Sitting, Cuff Size: Standard)   Temp (!) 94 5 °F (34 7 °C) (Tympanic)   Ht 5' 7" (1 702 m)   Wt 92 5 kg (204 lb)   BMI 31 95 kg/m²     Physical Exam  Constitutional:       Appearance: She is well-developed  HENT:      Head: Normocephalic and atraumatic  Right Ear: External ear normal       Left Ear: External ear normal       Nose: Nose normal    Eyes:      Conjunctiva/sclera: Conjunctivae normal       Pupils: Pupils are equal, round, and reactive to light  Neck:      Musculoskeletal: Normal range of motion and neck supple     Cardiovascular:      Rate and Rhythm: Normal rate and regular rhythm  Heart sounds: Normal heart sounds  No murmur  No friction rub  Pulmonary:      Effort: Pulmonary effort is normal  No respiratory distress  Breath sounds: Normal breath sounds  No wheezing or rales  Chest:      Chest wall: No tenderness  Abdominal:      General: Bowel sounds are normal       Palpations: Abdomen is soft  Musculoskeletal: Normal range of motion  Skin:     General: Skin is warm and dry  Capillary Refill: Capillary refill takes 2 to 3 seconds  Neurological:      Mental Status: She is alert and oriented to person, place, and time  Psychiatric:         Behavior: Behavior normal          Thought Content:  Thought content normal          Judgment: Judgment normal           Aisha Montes De Oca DO  [address] [facility]

## 2021-02-09 NOTE — PATIENT INSTRUCTIONS
Wellness Visit for Adults   AMBULATORY CARE:   A wellness visit  is when you see your healthcare provider to get screened for health problems  Your healthcare provider will also give you advice on how to stay healthy  Write down your questions so you remember to ask them  Ask your healthcare provider how often you should have a wellness visit  What happens at a wellness visit:  Your healthcare provider will ask about your health, and your family history of health problems  This includes high blood pressure, heart disease, and cancer  He or she will ask if you have symptoms that concern you, if you smoke, and about your mood  You may also be asked about your intake of medicines, supplements, food, and alcohol  Any of the following may be done:  · Your weight  will be checked  Your height may also be checked so your body mass index (BMI) can be calculated  Your BMI shows if you are at a healthy weight  · Your blood pressure  and heart rate will be checked  Your temperature may also be checked  · Blood and urine tests  may be done  Blood tests may be done to check your cholesterol levels  Abnormal cholesterol levels increase your risk for heart disease and stroke  You may also need a blood or urine test to check for diabetes if you are at increased risk  Urine tests may be done to look for signs of an infection or kidney disease  · A physical exam  includes checking your heartbeat and lungs with a stethoscope  Your healthcare provider may also check your skin to look for sun damage  · Screening tests  may be recommended  A screening test is done to check for diseases that may not cause symptoms  The screening tests you may need depend on your age, gender, family history, and lifestyle habits  For example, colorectal screening may be recommended if you are 48years old or older  Screening tests you need if you are a woman:   · A Pap smear  is used to screen for cervical cancer   Pap smears are usually done every 3 to 5 years depending on your age  You may need them more often if you have had abnormal Pap smear test results in the past  Ask your healthcare provider how often you should have a Pap smear  · A mammogram  is an x-ray of your breasts to screen for breast cancer  Experts recommend mammograms every 2 years starting at age 48 years  You may need a mammogram at age 52 years or younger if you have an increased risk for breast cancer  Talk to your healthcare provider about when you should start having mammograms and how often you need them  Vaccines you may need:   · Get an influenza vaccine  every year  The influenza vaccine protects you from the flu  Several types of viruses cause the flu  The viruses change over time, so new vaccines are made each year  · Get a tetanus-diphtheria (Td) booster vaccine  every 10 years  This vaccine protects you against tetanus and diphtheria  Tetanus is a severe infection that may cause painful muscle spasms and lockjaw  Diphtheria is a severe bacterial infection that causes a thick covering in the back of your mouth and throat  · Get a human papillomavirus (HPV) vaccine  if you are female and aged 23 to 32 or male 23 to 24 and never received it  This vaccine protects you from HPV infection  HPV is the most common infection spread by sexual contact  HPV may also cause vaginal, penile, and anal cancers  · Get a pneumococcal vaccine  if you are aged 72 years or older  The pneumococcal vaccine is an injection given to protect you from pneumococcal disease  Pneumococcal disease is an infection caused by pneumococcal bacteria  The infection may cause pneumonia, meningitis, or an ear infection  · Get a shingles vaccine  if you are 60 or older, even if you have had shingles before  The shingles vaccine is an injection to protect you from the varicella-zoster virus  This is the same virus that causes chickenpox   Shingles is a painful rash that develops in people who had chickenpox or have been exposed to the virus  How to eat healthy:  My Plate is a model for planning healthy meals  It shows the types and amounts of foods that should go on your plate  Fruits and vegetables make up about half of your plate, and grains and protein make up the other half  A serving of dairy is included on the side of your plate  The amount of calories and serving sizes you need depends on your age, gender, weight, and height  Examples of healthy foods are listed below:  · Eat a variety of vegetables  such as dark green, red, and orange vegetables  You can also include canned vegetables low in sodium (salt) and frozen vegetables without added butter or sauces  · Eat a variety of fresh fruits , canned fruit in 100% juice, frozen fruit, and dried fruit  · Include whole grains  At least half of the grains you eat should be whole grains  Examples include whole-wheat bread, wheat pasta, brown rice, and whole-grain cereals such as oatmeal     · Eat a variety of protein foods such as seafood (fish and shellfish), lean meat, and poultry without skin (turkey and chicken)  Examples of lean meats include pork leg, shoulder, or tenderloin, and beef round, sirloin, tenderloin, and extra lean ground beef  Other protein foods include eggs and egg substitutes, beans, peas, soy products, nuts, and seeds  · Choose low-fat dairy products such as skim or 1% milk or low-fat yogurt, cheese, and cottage cheese  · Limit unhealthy fats  such as butter, hard margarine, and shortening  Exercise:  Exercise at least 30 minutes per day on most days of the week  Some examples of exercise include walking, biking, dancing, and swimming  You can also fit in more physical activity by taking the stairs instead of the elevator or parking farther away from stores  Include muscle strengthening activities 2 days each week  Regular exercise provides many health benefits   It helps you manage your weight, and decreases your risk for type 2 diabetes, heart disease, stroke, and high blood pressure  Exercise can also help improve your mood  Ask your healthcare provider about the best exercise plan for you  General health and safety guidelines:   · Do not smoke  Nicotine and other chemicals in cigarettes and cigars can cause lung damage  Ask your healthcare provider for information if you currently smoke and need help to quit  E-cigarettes or smokeless tobacco still contain nicotine  Talk to your healthcare provider before you use these products  · Limit alcohol  A drink of alcohol is 12 ounces of beer, 5 ounces of wine, or 1½ ounces of liquor  · Lose weight, if needed  Being overweight increases your risk of certain health conditions  These include heart disease, high blood pressure, type 2 diabetes, and certain types of cancer  · Protect your skin  Do not sunbathe or use tanning beds  Use sunscreen with a SPF 15 or higher  Apply sunscreen at least 15 minutes before you go outside  Reapply sunscreen every 2 hours  Wear protective clothing, hats, and sunglasses when you are outside  · Drive safely  Always wear your seatbelt  Make sure everyone in your car wears a seatbelt  A seatbelt can save your life if you are in an accident  Do not use your cell phone when you are driving  This could distract you and cause an accident  Pull over if you need to make a call or send a text message  · Practice safe sex  Use latex condoms if are sexually active and have more than one partner  Your healthcare provider may recommend screening tests for sexually transmitted infections (STIs)  · Wear helmets, lifejackets, and protective gear  Always wear a helmet when you ride a bike or motorcycle, go skiing, or play sports that could cause a head injury  Wear protective equipment when you play sports  Wear a lifejacket when you are on a boat or doing water sports      © Copyright [employer] 2020 Information is for End User's use only and may not be sold, redistributed or otherwise used for commercial purposes  All illustrations and images included in CareNotes® are the copyrighted property of [employer]  or [employer]  The above information is an  only  It is not intended as medical advice for individual conditions or treatments  Talk to your doctor, nurse or pharmacist before following any medical regimen to see if it is safe and effective for you  Weight Management   AMBULATORY CARE:   Why it is important to manage your weight:  Being overweight increases your risk of health conditions such as heart disease, high blood pressure, type 2 diabetes, and certain types of cancer  It can also increase your risk for osteoarthritis, sleep apnea, and other respiratory problems  Aim for a slow, steady weight loss  Even a small amount of weight loss can lower your risk of health problems  How to lose weight safely:  A safe and healthy way to lose weight is to eat fewer calories and get regular exercise  · You can lose up about 1 pound a week by decreasing the number of calories you eat by 500 calories each day  You can decrease calories by eating smaller portion sizes or by cutting out high-calorie foods  Read labels to find out how many calories are in the foods you eat  · You can also burn calories with exercise such as walking, swimming, or biking  You will be more likely to keep weight off if you make these changes part of your lifestyle  Exercise at least 30 minutes per day on most days of the week  You can also fit in more physical activity by taking the stairs instead of the elevator or parking farther away from stores  Ask your healthcare provider about the best exercise plan for you  Healthy meal plan for weight management:  A healthy meal plan includes a variety of foods, contains fewer calories, and helps you stay healthy   A healthy meal plan includes the following:     · Eat whole-grain foods more often  A healthy meal plan should contain fiber  Fiber is the part of grains, fruits, and vegetables that is not broken down by your body  Whole-grain foods are healthy and provide extra fiber in your diet  Some examples of whole-grain foods are whole-wheat breads and pastas, oatmeal, brown rice, and bulgur  · Eat a variety of vegetables every day  Include dark, leafy greens such as spinach, kale, izabella greens, and mustard greens  Eat yellow and orange vegetables such as carrots, sweet potatoes, and winter squash  · Eat a variety of fruits every day  Choose fresh or canned fruit (canned in its own juice or light syrup) instead of juice  Fruit juice has very little or no fiber  · Eat low-fat dairy foods  Drink fat-free (skim) milk or 1% milk  Eat fat-free yogurt and low-fat cottage cheese  Try low-fat cheeses such as mozzarella and other reduced-fat cheeses  · Choose meat and other protein foods that are low in fat  Choose beans or other legumes such as split peas or lentils  Choose fish, skinless poultry (chicken or turkey), or lean cuts of red meat (beef or pork)  Before you cook meat or poultry, cut off any visible fat  · Use less fat and oil  Try baking foods instead of frying them  Add less fat, such as margarine, sour cream, regular salad dressing and mayonnaise to foods  Eat fewer high-fat foods  Some examples of high-fat foods include french fries, doughnuts, ice cream, and cakes  · Eat fewer sweets  Limit foods and drinks that are high in sugar  This includes candy, cookies, regular soda, and sweetened drinks  Ways to decrease calories:   · Eat smaller portions  ? Use a small plate with smaller servings  ? Do not eat second helpings  ? When you eat at a restaurant, ask for a box and place half of your meal in the box before you eat  ? Share an entrée with someone else  · Replace high-calorie snacks with healthy, low-calorie snacks  ? Choose fresh fruit, vegetables, fat-free rice cakes, or air-popped popcorn instead of potato chips, nuts, or chocolate  ? Choose water or calorie-free drinks instead of soda or sweetened drinks  · Do not shop for groceries when you are hungry  You may be more likely to make unhealthy food choices  Take a grocery list of healthy foods and shop after you have eaten  · Eat regular meals  Do not skip meals  Skipping meals can lead to overeating later in the day  This can make it harder for you to lose weight  Eat a healthy snack in place of a meal if you do not have time to eat a regular meal  Talk with a dietitian to help you create a meal plan and schedule that is right for you  Other things to consider as you try to lose weight:   · Be aware of situations that may give you the urge to overeat, such as eating while watching television  Find ways to avoid these situations  For example, read a book, go for a walk, or do crafts  · Meet with a weight loss support group or friends who are also trying to lose weight  This may help you stay motivated to continue working on your weight loss goals  © Copyright [address] Information is for End User's use only and may not be sold, redistributed or otherwise used for commercial purposes  All illustrations and images included in CareNotes® are the copyrighted property of [employer]  or [employer]  The above information is an  only  It is not intended as medical advice for individual conditions or treatments  Talk to your doctor, nurse or pharmacist before following any medical regimen to see if it is safe and effective for you

## 2021-02-10 NOTE — RESULT ENCOUNTER NOTE
Call patient to notify normal results cholesterol is near goal her cholesterol is 203 it should be less than 201 year ago was 163 her triglycerides are 153 they should be 151 year ago was 113 her LDL cholesterol is 114 it should be 101 year ago was 77 so I would say diet and exercise would be the best treatment

## 2021-03-02 ENCOUNTER — TELEMEDICINE (OUTPATIENT)
Dept: FAMILY MEDICINE CLINIC | Facility: CLINIC | Age: 51
End: 2021-03-02
Payer: COMMERCIAL

## 2021-03-02 VITALS — BODY MASS INDEX: 32.02 KG/M2 | HEIGHT: 67 IN | WEIGHT: 204 LBS

## 2021-03-02 DIAGNOSIS — Z03.818 ENCOUNTER FOR OBSERVATION FOR SUSPECTED EXPOSURE TO OTHER BIOLOGICAL AGENTS RULED OUT: ICD-10-CM

## 2021-03-02 DIAGNOSIS — J01.01 ACUTE RECURRENT MAXILLARY SINUSITIS: Primary | ICD-10-CM

## 2021-03-02 DIAGNOSIS — B34.9 VIRAL INFECTION, UNSPECIFIED: ICD-10-CM

## 2021-03-02 PROCEDURE — 99213 OFFICE O/P EST LOW 20 MIN: CPT | Performed by: FAMILY MEDICINE

## 2021-03-02 PROCEDURE — 1036F TOBACCO NON-USER: CPT | Performed by: FAMILY MEDICINE

## 2021-03-02 PROCEDURE — 3008F BODY MASS INDEX DOCD: CPT | Performed by: FAMILY MEDICINE

## 2021-03-02 PROCEDURE — U0003 INFECTIOUS AGENT DETECTION BY NUCLEIC ACID (DNA OR RNA); SEVERE ACUTE RESPIRATORY SYNDROME CORONAVIRUS 2 (SARS-COV-2) (CORONAVIRUS DISEASE [COVID-19]), AMPLIFIED PROBE TECHNIQUE, MAKING USE OF HIGH THROUGHPUT TECHNOLOGIES AS DESCRIBED BY CMS-2020-01-R: HCPCS | Performed by: FAMILY MEDICINE

## 2021-03-02 PROCEDURE — U0005 INFEC AGEN DETEC AMPLI PROBE: HCPCS | Performed by: FAMILY MEDICINE

## 2021-03-02 RX ORDER — AMOXICILLIN 500 MG/1
1000 CAPSULE ORAL EVERY 12 HOURS SCHEDULED
Qty: 40 CAPSULE | Refills: 0 | Status: SHIPPED | OUTPATIENT
Start: 2021-03-02 | End: 2021-03-12

## 2021-03-02 NOTE — PROGRESS NOTES
COVID-19 Virtual Visit     Assessment/Plan:    Problem List Items Addressed This Visit     None         Disposition:     I recommended patient come to the office for further evaluation  I have spent 8 minutes directly with the patient  Greater than 50% of this time was spent in counseling/coordination of care regarding: diagnostic results, risks and benefits of treatment options and instructions for management  Encounter provider Berny Chambers DO    Provider located at [address] [address]    Recent Visits  No visits were found meeting these conditions  Showing recent visits within past 7 days and meeting all other requirements     Today's Visits  Date Type Provider Dept   03/02/21 Telemedicine Berny Chambers DO [facility]   Showing today's visits and meeting all other requirements     Future Appointments  No visits were found meeting these conditions  Showing future appointments within next 150 days and meeting all other requirements      This virtual check-in was done via [employer] Du[employer] and patient was informed that this is not a secure, HIPAA-compliant platform  She agrees to proceed  Patient agrees to participate in a virtual check in via telephone or video visit instead of presenting to the office to address urgent/immediate medical needs  Patient is aware this is a billable service  After connecting through [facility], the patient was identified by name and date of birth  Alexei Keys was informed that this was a telemedicine visit and that the exam was being conducted confidentially over secure lines  My office door was closed  No one else was in the room  Alexei Keys acknowledged consent and understanding of privacy and security of the telemedicine visit  I informed the patient that I have reviewed her record in Epic and presented the opportunity for her to ask any questions regarding the visit today   The patient agreed to participate  Subjective: Akiko Cotter is a 44 y o  female who is concerned about COVID-19  Patient's symptoms include nasal congestion, rhinorrhea, sore throat and headache  Date of symptom onset: 3/1/2021    Exposure:   Contact with a person who is under investigation (PUI) for or who is positive for COVID-19 within the last 14 days?: No    Hospitalized recently for fever and/or lower respiratory symptoms?: No      Currently a healthcare worker that is involved in direct patient care?: No      Works in a special setting where the risk of COVID-19 transmission may be high? (this may include long-term care, correctional and [facility] facilities; homeless shelters; assisted-living facilities and group homes ): No      Resident in a special setting where the risk of COVID-19 transmission may be high? (this may include long-term care, correctional and [facility] facilities; homeless shelters; assisted-living facilities and group homes ): No      No results found for: Ruddy Galvan, [address] [facility], [facility]  No past medical history on file    Past Surgical History:   Procedure Laterality Date    BREAST BIOPSY Left 2009    core bx; benign     CHOLECYSTECTOMY  1995    HYSTERECTOMY      age 26    TOOTH EXTRACTION       Current Outpatient Medications   Medication Sig Dispense Refill    Calcium-Vitamin D-Vitamin K (VIACTIV PO) Take by mouth daily      cetirizine (ZyrTEC) 10 MG chewable tablet Chew 10 mg daily      Cholecalciferol (RA VITAMIN D-3) 2000 units CAPS Take by mouth daily      desvenlafaxine succinate (PRISTIQ) 50 mg 24 hr tablet TAKE 1 TABLET DAILY 90 tablet 3    fexofenadine (ALLEGRA) 180 MG tablet Take 1 tablet by mouth daily      Glucosamine-Chondroitin (GLUCOSAMINE CHONDR COMPLEX PO) Take by mouth daily With Tumeric      hydrochlorothiazide (HYDRODIURIL) 12 5 mg tablet Take 1 tablet (12 5 mg total) by mouth daily 30 tablet 5    LORazepam (ATIVAN) 0 5 mg tablet Take 1 tablet (0 5 mg total) by mouth daily at bedtime 15 tablet 0    metoprolol succinate (TOPROL-XL) 50 mg 24 hr tablet TAKE 1 TABLET DAILY 90 tablet 3    Multiple Vitamin (MULTIVITAMIN) tablet Take 1 tablet by mouth daily      Na Sulfate-K Sulfate-Mg Sulf (Suprep Bowel Prep Kit) 17 5-3 13-1 6 GM/177ML SOLN Take as directed by the office  2 Bottle 0    rosuvastatin (CRESTOR) 10 MG tablet Take 1 tablet (10 mg total) by mouth daily 90 tablet 3     No current facility-administered medications for this visit  Allergies   Allergen Reactions    Codeine Other (See Comments)     "throat swells"       Review of Systems   HENT: Positive for congestion, rhinorrhea and sore throat  Neurological: Positive for headaches  Objective:    Vitals:    03/02/21 1033   Weight: 92 5 kg (204 lb)   Height: 5' 7" (1 702 m)       Physical Exam  Constitutional:       Appearance: Normal appearance  HENT:      Head: Normocephalic  Nose: Congestion present  Eyes:      Conjunctiva/sclera: Conjunctivae normal    Pulmonary:      Effort: Pulmonary effort is normal    Neurological:      Mental Status: She is alert and oriented to person, place, and time  Psychiatric:         Mood and Affect: Mood normal          Behavior: Behavior normal          Thought Content: Thought content normal          Judgment: Judgment normal        VIRTUAL VISIT DISCLAIMER    Nino Barbour acknowledges that she has consented to an online visit or consultation  She understands that the online visit is based solely on information provided by her, and that, in the absence of a face-to-face physical evaluation by the physician, the diagnosis she receives is both limited and provisional in terms of accuracy and completeness  This is not intended to replace a full medical face-to-face evaluation by the physician  Nino Barbour understands and accepts these terms

## 2021-03-03 LAB — SARS-COV-2 RNA RESP QL NAA+PROBE: NEGATIVE

## 2021-03-31 DIAGNOSIS — Z23 ENCOUNTER FOR IMMUNIZATION: ICD-10-CM

## 2021-04-07 DIAGNOSIS — F41.8 ANXIETY ASSOCIATED WITH DEPRESSION: ICD-10-CM

## 2021-04-07 RX ORDER — DESVENLAFAXINE 50 MG/1
TABLET, EXTENDED RELEASE ORAL
Qty: 90 TABLET | Refills: 3 | Status: SHIPPED | OUTPATIENT
Start: 2021-04-07 | End: 2022-02-28 | Stop reason: SDUPTHER

## 2021-04-27 ENCOUNTER — HOSPITAL ENCOUNTER (OUTPATIENT)
Dept: MAMMOGRAPHY | Facility: HOSPITAL | Age: 51
Discharge: HOME | End: 2021-04-27
Attending: FAMILY MEDICINE
Payer: COMMERCIAL

## 2021-04-27 VITALS — HEIGHT: 67 IN | BODY MASS INDEX: 32.02 KG/M2 | WEIGHT: 204 LBS

## 2021-04-27 DIAGNOSIS — Z00.00 ANNUAL PHYSICAL EXAM: ICD-10-CM

## 2021-04-27 DIAGNOSIS — Z12.31 ENCOUNTER FOR SCREENING MAMMOGRAM FOR BREAST CANCER: ICD-10-CM

## 2021-04-27 PROCEDURE — 77067 SCR MAMMO BI INCL CAD: CPT

## 2021-04-27 PROCEDURE — 77063 BREAST TOMOSYNTHESIS BI: CPT

## 2021-06-22 ENCOUNTER — VBI (OUTPATIENT)
Dept: ADMINISTRATIVE | Facility: OTHER | Age: 51
End: 2021-06-22

## 2021-08-01 DIAGNOSIS — I10 ESSENTIAL HYPERTENSION: ICD-10-CM

## 2021-08-02 RX ORDER — METOPROLOL SUCCINATE 50 MG/1
TABLET, EXTENDED RELEASE ORAL
Qty: 90 TABLET | Refills: 3 | Status: SHIPPED | OUTPATIENT
Start: 2021-08-02 | End: 2022-01-19

## 2021-08-16 ENCOUNTER — OFFICE VISIT (OUTPATIENT)
Dept: FAMILY MEDICINE CLINIC | Facility: CLINIC | Age: 51
End: 2021-08-16
Payer: COMMERCIAL

## 2021-08-16 VITALS
SYSTOLIC BLOOD PRESSURE: 174 MMHG | DIASTOLIC BLOOD PRESSURE: 102 MMHG | HEIGHT: 67 IN | TEMPERATURE: 97.6 F | WEIGHT: 192 LBS | BODY MASS INDEX: 30.13 KG/M2

## 2021-08-16 DIAGNOSIS — M54.16 ACUTE RIGHT LUMBAR RADICULOPATHY: Primary | ICD-10-CM

## 2021-08-16 PROCEDURE — 99214 OFFICE O/P EST MOD 30 MIN: CPT | Performed by: FAMILY MEDICINE

## 2021-08-16 RX ORDER — PREDNISONE 10 MG/1
TABLET ORAL
Qty: 32 TABLET | Refills: 0 | Status: SHIPPED | OUTPATIENT
Start: 2021-08-16 | End: 2021-08-27 | Stop reason: ALTCHOICE

## 2021-08-16 RX ORDER — CYCLOBENZAPRINE HCL 10 MG
10 TABLET ORAL 3 TIMES DAILY
Qty: 20 TABLET | Refills: 0 | Status: SHIPPED | OUTPATIENT
Start: 2021-08-16 | End: 2021-10-20

## 2021-08-16 NOTE — PROGRESS NOTES
BMI Counseling: Body mass index is 30 07 kg/m²  The BMI is above normal  Nutrition recommendations include decreasing portion sizes, encouraging healthy choices of fruits and vegetables, moderation in carbohydrate intake and increasing intake of lean protein  Exercise recommendations include exercising 3-5 times per week  No pharmacotherapy was ordered  Assessment/Plan:    Problem List Items Addressed This Visit     None      Visit Diagnoses     Acute right lumbar radiculopathy    -  Primary           Diagnoses and all orders for this visit:    Acute right lumbar radiculopathy        No problem-specific Assessment & Plan notes found for this encounter  Subjective:      Patient ID: Leatha Cogan is a 44 y o  female  Patient has acute right lumbar radiculopathy with pain radiating around the right hip down the right anterior thigh patient has been going to a chiropractor and on he has feels that he has offered her what ever he can to try to make her pain-free and she he has not been successful and suggested that she see us for further treatment she has not been visualized but needs visualization at the present time she does not feel weakness in the leg      The following portions of the patient's history were reviewed and updated as appropriate:   She has no past medical history on file  ,  does not have any pertinent problems on file  ,   has a past surgical history that includes Cholecystectomy (1995); Tooth extraction; Hysterectomy; and Breast biopsy (Left, 2009)  ,  family history includes Alzheimer's disease in her mother; Breast cancer (age of onset: 39) in her cousin; Emphysema in her maternal aunt; Heart disease in her father; Lung cancer in her maternal grandfather, mother, and paternal grandfather; No Known Problems in her daughter, paternal grandmother, sister, sister, and sister; Prostate cancer in her father; Tongue cancer in her maternal grandmother  ,   reports that she has never smoked  She has never used smokeless tobacco  She reports current alcohol use  She reports that she does not use drugs  ,  is allergic to codeine     Current Outpatient Medications   Medication Sig Dispense Refill    Calcium-Vitamin D-Vitamin K (VIACTIV PO) Take by mouth daily      cetirizine (ZyrTEC) 10 MG chewable tablet Chew 10 mg daily      Cholecalciferol (RA VITAMIN D-3) 2000 units CAPS Take by mouth daily      desvenlafaxine succinate (PRISTIQ) 50 mg 24 hr tablet TAKE 1 TABLET DAILY 90 tablet 3    fexofenadine (ALLEGRA) 180 MG tablet Take 1 tablet by mouth daily      Glucosamine-Chondroitin (GLUCOSAMINE CHONDR COMPLEX PO) Take by mouth daily With Tumeric      hydrochlorothiazide (HYDRODIURIL) 12 5 mg tablet Take 1 tablet (12 5 mg total) by mouth daily 30 tablet 5    LORazepam (ATIVAN) 0 5 mg tablet Take 1 tablet (0 5 mg total) by mouth daily at bedtime 15 tablet 0    metoprolol succinate (TOPROL-XL) 50 mg 24 hr tablet TAKE 1 TABLET DAILY 90 tablet 3    Multiple Vitamin (MULTIVITAMIN) tablet Take 1 tablet by mouth daily      rosuvastatin (CRESTOR) 10 MG tablet Take 1 tablet (10 mg total) by mouth daily 90 tablet 3    Na Sulfate-K Sulfate-Mg Sulf (Suprep Bowel Prep Kit) 17 5-3 13-1 6 GM/177ML SOLN Take as directed by the office  (Patient not taking: Reported on 8/16/2021) 2 Bottle 0     No current facility-administered medications for this visit  Review of Systems   Constitutional: Negative for activity change, appetite change, diaphoresis, fatigue and fever  HENT: Negative  Eyes: Negative  Respiratory: Negative for apnea, cough, chest tightness, shortness of breath and wheezing  Cardiovascular: Negative for chest pain, palpitations and leg swelling  Gastrointestinal: Negative for abdominal distention, abdominal pain, anal bleeding, constipation, diarrhea, nausea and vomiting  Endocrine: Negative for cold intolerance, heat intolerance, polydipsia, polyphagia and polyuria     Genitourinary: Negative for difficulty urinating, dysuria, flank pain, hematuria and urgency  Musculoskeletal: Positive for back pain  Negative for arthralgias, gait problem, joint swelling and myalgias  Skin: Negative for color change, rash and wound  Allergic/Immunologic: Negative for environmental allergies, food allergies and immunocompromised state  Neurological: Negative for dizziness, seizures, syncope, speech difficulty, numbness and headaches  Right lumbar radiculopathy   Hematological: Negative for adenopathy  Does not bruise/bleed easily  Psychiatric/Behavioral: Negative for agitation, behavioral problems, hallucinations, sleep disturbance and suicidal ideas  Objective:  Vitals:    08/16/21 1533   BP: (!) 174/102   BP Location: Left arm   Patient Position: Sitting   Cuff Size: Standard   Temp: 97 6 °F (36 4 °C)   TempSrc: Temporal   Weight: 87 1 kg (192 lb)   Height: 5' 7" (1 702 m)     Body mass index is 30 07 kg/m²  Physical Exam  Constitutional:       General: She is not in acute distress  Appearance: She is well-developed  She is obese  She is not diaphoretic  HENT:      Head: Normocephalic  Right Ear: External ear normal       Left Ear: External ear normal       Nose: Nose normal    Eyes:      General: No scleral icterus  Right eye: No discharge  Left eye: No discharge  Conjunctiva/sclera: Conjunctivae normal       Pupils: Pupils are equal, round, and reactive to light  Neck:      Thyroid: No thyromegaly  Trachea: No tracheal deviation  Cardiovascular:      Rate and Rhythm: Normal rate and regular rhythm  Heart sounds: Normal heart sounds  No murmur heard  No friction rub  No gallop  Pulmonary:      Effort: Pulmonary effort is normal  No respiratory distress  Breath sounds: Normal breath sounds  No wheezing  Abdominal:      General: Bowel sounds are normal       Palpations: Abdomen is soft  There is no mass  Tenderness:  There is no abdominal tenderness  There is no guarding  Musculoskeletal:         General: No deformity  Cervical back: Normal range of motion  Comments: Positive straight leg raising right unable to flex the hip against resistance right side unable to straighten the right lower leg against resistance right side no plantar or dorsiflexion weakness   Lymphadenopathy:      Cervical: No cervical adenopathy  Skin:     General: Skin is warm and dry  Findings: No erythema or rash  Neurological:      Mental Status: She is alert and oriented to person, place, and time  Cranial Nerves: No cranial nerve deficit  Comments: Unable to flex right hip against resistance also unable to straighten right lower leg against resistance and unable to push out word against resistance with right lower extremity   Psychiatric:         Thought Content:  Thought content normal

## 2021-08-16 NOTE — LETTER
August 16, 2021     Patient: Ana Maria Foot   YOB: 1970   Date of Visit: 8/16/2021       To Whom it May Concern:    Taz Riggs is under my professional care  She was seen in my office on 8/16/2021  She may return to school on August 17th but should work from home for 1 week due to a back injury  If you have any questions or concerns, please don't hesitate to call           Sincerely,          Ranjana Rivera, DO        CC: No Recipients

## 2021-08-17 ENCOUNTER — APPOINTMENT (OUTPATIENT)
Dept: RADIOLOGY | Facility: MEDICAL CENTER | Age: 51
End: 2021-08-17
Payer: COMMERCIAL

## 2021-08-17 DIAGNOSIS — M54.16 ACUTE RIGHT LUMBAR RADICULOPATHY: ICD-10-CM

## 2021-08-17 PROCEDURE — 72114 X-RAY EXAM L-S SPINE BENDING: CPT

## 2021-08-23 ENCOUNTER — TELEPHONE (OUTPATIENT)
Dept: FAMILY MEDICINE CLINIC | Facility: CLINIC | Age: 51
End: 2021-08-23

## 2021-08-26 ENCOUNTER — EVALUATION (OUTPATIENT)
Dept: PHYSICAL THERAPY | Facility: CLINIC | Age: 51
End: 2021-08-26
Payer: COMMERCIAL

## 2021-08-26 DIAGNOSIS — M54.16 ACUTE RIGHT LUMBAR RADICULOPATHY: ICD-10-CM

## 2021-08-26 PROCEDURE — 97112 NEUROMUSCULAR REEDUCATION: CPT

## 2021-08-26 PROCEDURE — 97110 THERAPEUTIC EXERCISES: CPT

## 2021-08-26 PROCEDURE — 97140 MANUAL THERAPY 1/> REGIONS: CPT

## 2021-08-26 PROCEDURE — 97162 PT EVAL MOD COMPLEX 30 MIN: CPT

## 2021-08-26 NOTE — PROGRESS NOTES
PT Evaluation     Today's date: 2021  Patient name: Juan Antonio Gonsalez  : 1970  MRN: [id number]  Referring provider: Kina Luther DO  Dx:   Encounter Diagnosis     ICD-10-CM    1  Acute right lumbar radiculopathy  M54 [id number] Ambulatory referral to Physical Therapy       Start Time: 824  Stop Time: 930  Total time in clinic (min): 66 minutes    Assessment  Assessment details: Patient is a 45 yo female with medical diagnosis of acute lumbar radiculopathy, right  Following a thorough physical therapy evaluation, the patient presents with sign and symptoms that are consistent with right low back pain and right L2/3 radiculopathy:     During the initial evaluation the following have been identified:     -Physical and physiological impairments: decreased R hip ROM, decreased R hip and knee strength, R gluteal and thigh numbness, R gluteal and LBP     -Activity limitations: decreased sitting tolerance, decreased ambulation distance, inability to lift     -Participation restrictions: unable to lift items at work, pain c/ driving, difficulty sleeping comfortably    Patient will benefit from skilled physical therapy treatment to address the aforementioned impairments and functional deficits, accomplish patient goals, return patient to PLOF and all duties at work  Impairments: abnormal gait, abnormal or restricted ROM, activity intolerance, impaired physical strength and pain with function    Symptom irritability: moderate   Prognosis: good    Goals  STG 1: Patient's lumbar mobility will improve by 5 degrees in all directions in 2 weeks demonstrating improved back mobility and decreased muscle tension  STG 2: Patient will demonstrate improved hip flexor strength on the right in 2 weeks to demonstrate centralization of symptoms and improved motor control  STG 3: Patient will self-report ability to sit/drive for 10 minutes with <=3/10 in 2 weeks to demonstrate improved functional capacity for ADLs    LTG 1: Patient will demonstrate equal quad and hamstring strength bilaterally in 4 weeks to demonstrate functional capacity for gait  LTG 2: Patient will self-report ability to drive with no symptoms in 4 weeks to demonstrate improved functional capacity for ADLs  LTG 3: Patient will be independent with HEP in 4 weeks to allow independent management of condition  Plan  Patient would benefit from: skilled physical therapy  Planned modality interventions: cryotherapy and thermotherapy: hydrocollator packs  Planned therapy interventions: balance, flexibility, functional ROM exercises, gait training, home exercise program, therapeutic exercise, therapeutic activities, stretching, strengthening, self care, patient education, postural training, neuromuscular re-education, manual therapy, massage and joint mobilization  Frequency: 2x week  Duration in weeks: 4  Plan of Care beginning date: 8/26/2021  Treatment plan discussed with: patient        Subjective Evaluation    History of Present Illness  Mechanism of injury: Patient is a 43 yo female presenting with LBP c/ RLE radiculopathy  Symptoms began 8/13/21, no known reasons for onset, with R low back and gluteal pain, R superolateral gluteal and anterior thigh numbness, R hip flexor weakness and muscle spasms with walking  Pt  reports her doctor prescribed steroids medications and muscle relaxants, which have helped considerably with pain and spasms, but symptoms have not completely resolved  She has also visited her chiropractor who performed adjustments and e-stim, which patient reported have helped her symptoms  Currently, she stopped treatment with chiropractor due to doctor recommendation to begin PT  She still is experiencing R low back and gluteal pain, R superolateral gluteal and anterior thigh numbness and R hip flexor weakness  Aggravating factors for her pain and symptoms include: sitting, driving, walking occasionally, lifting   Relieving factors for her pain and symptoms include standing, side-lying on left side, ice, steroid medications  Patients primary goals for PT are to understand what is going on, reduce symptoms and return to PLOF  Pain  Current pain rating: 3  At best pain ratin  At worst pain ratin  Quality: dull ache and sharp  Progression: improved      Diagnostic Tests  X-ray: normal  Treatments  Previous treatment: chiropractic  Patient Goals  Patient goals for therapy: increased strength, return to sport/leisure activities, decreased pain and increased motion          Objective     General Comments:      Lumbar Comments  Observation:     -Gait: moderate trendelenburg on right, antalgic gait on right     -Squat: decreased DF ROM and lumbar flexion     -Standing Posture: Increased muscle tone in right erector spinae    TTP: inferior to R PSIS and R erector spinae    Neuro Screen:  Clonus (R/L): - / -  DTRs:      -L3/4 (Patellar, R/L): 1+ / 1+     -S1 (Achilles tendon; R/L): 2+ / 2+    Lumbar ROM (degrees):   FLX: 67  EXT: 13  SB (R/L): 20 / 15    Lumbar/SI/Hip Special Tests:  Slump (R/L): + / -  Passive SLR (R/L): - / -,crossed SLR (-) b/l  Thigh Thrust (R/L): - / -  Scour (R/L): - / -  BECK (R/L): + for lateral hip pain, modified BECK pain stayed the same / -    Hip ROM (degrees):  ER (R/L): 32 / 38  IR (R/L): 42 / 38    Hip Strength (MMT Grades):  FLX (Supine): 3- / 4-  ABD (Side-lying, R/L): 4- / 4    Knee Strength (lbs):  EXT (R/L): 37 / 47  FLX (R/L): 28 / 34     Precautions: None      Date        Manuals        LAD        Lumbar Roll        Sacrel MET KS       Upslip/Downslip KS       Shotgun        Lateral Hip Distraction Grade III/IV: KS       Manual Traction KS                               Neuro Re-Ed        Abdominal Hollow        Abdominal Isometric -->       Supine Clams 5" x 20  HEP       S/Lying Clams -->       Ball Squeeze -->       TB Rows/Ext -->       Seated Floss                                Ther Ex        LTRs x30 ea  SKTC 10" x 10       Piriformis Stretch -->       HS Stretch        Hip Flexor Stretch        Abdominal Hollow March        Open Book        Plank        Birddog UE/LE        Leg Press        Seated Flexion/SB -->       Hand/Heel Rock: FLX & EXT 5" x 10 ea  way       Stand: Ext/Abd -->       Bike/Treadmill                                Ther Activity        Chop/Lift        Bridge        Box Lift        Wall Squat        Lunge        Sit to Stand                        Modalities

## 2021-08-27 ENCOUNTER — OFFICE VISIT (OUTPATIENT)
Dept: FAMILY MEDICINE CLINIC | Facility: CLINIC | Age: 51
End: 2021-08-27
Payer: COMMERCIAL

## 2021-08-27 VITALS
WEIGHT: 192 LBS | SYSTOLIC BLOOD PRESSURE: 138 MMHG | HEIGHT: 67 IN | TEMPERATURE: 97.5 F | DIASTOLIC BLOOD PRESSURE: 98 MMHG | BODY MASS INDEX: 30.13 KG/M2

## 2021-08-27 DIAGNOSIS — M54.16 LUMBAR RADICULOPATHY, ACUTE: Primary | ICD-10-CM

## 2021-08-27 PROCEDURE — 99213 OFFICE O/P EST LOW 20 MIN: CPT | Performed by: FAMILY MEDICINE

## 2021-08-27 PROCEDURE — 1036F TOBACCO NON-USER: CPT | Performed by: FAMILY MEDICINE

## 2021-08-27 PROCEDURE — 3008F BODY MASS INDEX DOCD: CPT | Performed by: FAMILY MEDICINE

## 2021-08-27 RX ORDER — IBUPROFEN 600 MG/1
600 TABLET ORAL EVERY 8 HOURS PRN
Qty: 60 TABLET | Refills: 0 | Status: SHIPPED | OUTPATIENT
Start: 2021-08-27 | End: 2021-10-12 | Stop reason: SDUPTHER

## 2021-08-27 NOTE — PROGRESS NOTES
Assessment/Plan:  Patient has had improvement in her condition but still experiences a right lumbar radiculopathy she will continue physical therapy I did encourage her also to work on core strengthening she has changed her work station and has had improvement in her symptoms she is taking a muscle relaxer at night I have asked her now that she is off steroids to take 2 extra-strength Tylenol and 600 mg of ibuprofen 3 times daily spaced out approximately 6 hours apart also patient still needs an MRI of the lumbar spine we will attempt to on petition her insurance after she has completed another session or 2 of physical therapy if she continues to have symptoms recheck in 4 weeks    Problem List Items Addressed This Visit     None           There are no diagnoses linked to this encounter  No problem-specific Assessment & Plan notes found for this encounter  Subjective:      Patient ID: Gina Jaramillo is a 44 y o  female      Mrs  had [address] is here for a follow-up regarding her back pain she has attended 2 physical therapy sessions she is she did respond nicely to the steroid pulse and is finished with the steroids she is taking the cyclobenzaprine at bedtime and that is helping with a lot of her spasm she still has lumbar radiculopathy traveling down the right buttocks to about the level of the knee patient did also purchase a versa desk for her computer at work so now she completes most of her work standing as opposed to seated at her desk on she has had at least a 50% reduction in her pain she can bend forward and almost touch her toes rotation to the right is problematic and precipitates pain rotation to the left is limited but does not precipitate pain continues to have right lumbar radiculopathy patient still has significant issues getting in and out of her car and during her drive to and from work the lumbar support of her seat particularly causes pain      The following portions of the patient's history were reviewed and updated as appropriate:   She has no past medical history on file  ,  does not have any pertinent problems on file  ,   has a past surgical history that includes Cholecystectomy (1995); Tooth extraction; Hysterectomy; and Breast biopsy (Left, 2009)  ,  family history includes Alzheimer's disease in her mother; Breast cancer (age of onset: 39) in her cousin; Emphysema in her maternal aunt; Heart disease in her father; Lung cancer in her maternal grandfather, mother, and paternal grandfather; No Known Problems in her daughter, paternal grandmother, sister, sister, and sister; Prostate cancer in her father; Tongue cancer in her maternal grandmother  ,   reports that she has never smoked  She has never used smokeless tobacco  She reports current alcohol use  She reports that she does not use drugs  ,  is allergic to codeine     Current Outpatient Medications   Medication Sig Dispense Refill    Calcium-Vitamin D-Vitamin K (VIACTIV PO) Take by mouth daily      cetirizine (ZyrTEC) 10 MG chewable tablet Chew 10 mg daily      Cholecalciferol (RA VITAMIN D-3) 2000 units CAPS Take by mouth daily      cyclobenzaprine (FLEXERIL) 10 mg tablet Take 1 tablet (10 mg total) by mouth 3 (three) times a day (Patient taking differently: Take 10 mg by mouth daily at bedtime ) 20 tablet 0    desvenlafaxine succinate (PRISTIQ) 50 mg 24 hr tablet TAKE 1 TABLET DAILY 90 tablet 3    fexofenadine (ALLEGRA) 180 MG tablet Take 1 tablet by mouth daily      Glucosamine-Chondroitin (GLUCOSAMINE CHONDR COMPLEX PO) Take by mouth daily With Tumeric      hydrochlorothiazide (HYDRODIURIL) 12 5 mg tablet Take 1 tablet (12 5 mg total) by mouth daily 30 tablet 5    metoprolol succinate (TOPROL-XL) 50 mg 24 hr tablet TAKE 1 TABLET DAILY (Patient taking differently: 100 mg ) 90 tablet 3    Multiple Vitamin (MULTIVITAMIN) tablet Take 1 tablet by mouth daily      Na Sulfate-K Sulfate-Mg Sulf (Suprep Bowel Prep Kit) 17 5-3 13-1 6 GM/177ML SOLN Take as directed by the office  2 Bottle 0    rosuvastatin (CRESTOR) 10 MG tablet Take 1 tablet (10 mg total) by mouth daily 90 tablet 3     No current facility-administered medications for this visit  Review of Systems   Constitutional: Negative for activity change, appetite change, diaphoresis, fatigue and fever  HENT: Negative  Eyes: Negative  Respiratory: Negative for apnea, cough, chest tightness, shortness of breath and wheezing  Cardiovascular: Negative for chest pain, palpitations and leg swelling  Gastrointestinal: Negative for abdominal distention, abdominal pain, anal bleeding, constipation, diarrhea, nausea and vomiting  Endocrine: Negative for cold intolerance, heat intolerance, polydipsia, polyphagia and polyuria  Genitourinary: Negative for difficulty urinating, dysuria, flank pain, hematuria and urgency  Musculoskeletal: Positive for back pain  Negative for arthralgias, gait problem, joint swelling and myalgias  Skin: Negative for color change, rash and wound  Allergic/Immunologic: Negative for environmental allergies, food allergies and immunocompromised state  Neurological: Negative for dizziness, seizures, syncope, speech difficulty, numbness and headaches  Right lumbar radiculopathy   Hematological: Negative for adenopathy  Does not bruise/bleed easily  Psychiatric/Behavioral: Negative for agitation, behavioral problems, hallucinations, sleep disturbance and suicidal ideas  Objective:  Vitals:    08/27/21 0703   BP: 138/98   BP Location: Left arm   Patient Position: Sitting   Cuff Size: Standard   Temp: 97 5 °F (36 4 °C)   TempSrc: Temporal   Weight: 87 1 kg (192 lb)   Height: 5' 7" (1 702 m)     Body mass index is 30 07 kg/m²  Physical Exam  Constitutional:       General: She is not in acute distress  Appearance: She is well-developed  She is not diaphoretic  HENT:      Head: Normocephalic        Right Ear: External ear normal       Left Ear: External ear normal       Nose: Nose normal    Eyes:      General: No scleral icterus  Right eye: No discharge  Left eye: No discharge  Conjunctiva/sclera: Conjunctivae normal       Pupils: Pupils are equal, round, and reactive to light  Neck:      Thyroid: No thyromegaly  Trachea: No tracheal deviation  Cardiovascular:      Rate and Rhythm: Normal rate and regular rhythm  Heart sounds: Normal heart sounds  No murmur heard  No friction rub  No gallop  Pulmonary:      Effort: Pulmonary effort is normal  No respiratory distress  Breath sounds: Normal breath sounds  No wheezing  Abdominal:      General: Bowel sounds are normal       Palpations: Abdomen is soft  There is no mass  Tenderness: There is no abdominal tenderness  There is no guarding  Musculoskeletal:         General: No deformity  Cervical back: Normal range of motion  Lymphadenopathy:      Cervical: No cervical adenopathy  Skin:     General: Skin is warm and dry  Findings: No erythema or rash  Neurological:      Mental Status: She is alert and oriented to person, place, and time  Cranial Nerves: No cranial nerve deficit  Psychiatric:         Thought Content:  Thought content normal

## 2021-08-30 ENCOUNTER — OFFICE VISIT (OUTPATIENT)
Dept: PHYSICAL THERAPY | Facility: CLINIC | Age: 51
End: 2021-08-30
Payer: COMMERCIAL

## 2021-08-30 DIAGNOSIS — M54.16 ACUTE RIGHT LUMBAR RADICULOPATHY: Primary | ICD-10-CM

## 2021-08-30 PROCEDURE — 97112 NEUROMUSCULAR REEDUCATION: CPT

## 2021-08-30 PROCEDURE — 97140 MANUAL THERAPY 1/> REGIONS: CPT

## 2021-08-30 PROCEDURE — 97110 THERAPEUTIC EXERCISES: CPT

## 2021-08-30 NOTE — PROGRESS NOTES
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  She notes she will not lift anything, but does have to get items from varying heights, bend down to pick things up and move items across her body  Objective: See treatment diary below      Assessment: Tolerated treatment well  Patient exhibited good technique with therapeutic exercises  Patient continues to experience significant relief with belted traction  She demonstrated a good TA contraction and was able to maintain the contraction throughout the treatment session with all postural/core activation exercises  D/c'd standing trunk extension in HEP, due to patient self-report of flexion preference and pain/discomfort with that exercise  Plan: Continue per plan of care  Will continue to assess for symptom presentation, focusing on postural core and hip musculature and lumbar mobility  Precautions: None      Date       Manuals        LAD        Lumbar Roll  Grade V: KS      Sacrel MET KS KS      Upslip/Downslip KS KS      Shotgun        Lateral Hip Distraction Grade III/IV: KS -->      Manual Traction KS KS                              Neuro Re-Ed        Abdominal Hollow        Abdominal Isometric --> 5" x 20      Supine Clams 5" x 20  HEP 5" x20      S/Lying Clams --> -->       Ball Squeeze --> 5" x20      CC Rows & Ext --> P1, 2x10      Seated Floss                                Ther Ex        LTRs x30 ea  x30ea        SKTC 10" x 10 10"x10      Piriformis Stretch --> -->      HS Stretch        Hip Flexor Stretch Abdominal Hollow March        Open Book        Plank        Birddog UE/LE        Leg Press        Seated Flexion/SB --> 10" x 10      Hand/Heel Rock: FLX & EXT 5" x 10 ea  way 5" x20 ea  way      Stand: Ext/Abd --> -->      Bike/Treadmill                                Ther Activity        Chop/Lift        Bridge        Box Lift        Wall Squat        Lunge        Sit to Stand                        Modalities

## 2021-09-01 ENCOUNTER — APPOINTMENT (OUTPATIENT)
Dept: PHYSICAL THERAPY | Facility: CLINIC | Age: 51
End: 2021-09-01
Payer: COMMERCIAL

## 2021-09-08 ENCOUNTER — OFFICE VISIT (OUTPATIENT)
Dept: PHYSICAL THERAPY | Facility: CLINIC | Age: 51
End: 2021-09-08
Payer: COMMERCIAL

## 2021-09-08 DIAGNOSIS — M54.16 ACUTE RIGHT LUMBAR RADICULOPATHY: Primary | ICD-10-CM

## 2021-09-08 PROCEDURE — 97112 NEUROMUSCULAR REEDUCATION: CPT

## 2021-09-08 PROCEDURE — 97110 THERAPEUTIC EXERCISES: CPT

## 2021-09-08 NOTE — PROGRESS NOTES
Daily Note     Today's date: 2021  Patient name: Melodie Dietz  : 1970  MRN: [id number]  Referring provider: Matias Roman DO  Dx:   Encounter Diagnosis     ICD-10-CM    1  Acute right lumbar radiculopathy  M54 16        Start Time: 1630  Stop Time: 1725  Total time in clinic (min): 55 minutes    Subjective: Patient reports considerable increase in low back pain and pain in right hip region which she attributes to changing to working from home, where she has been seated for prolonged periods of time  She notes car rides have also been increasing her pain, due to seated position  Objective: See treatment diary below      Assessment: Tolerated treatment well  Patient exhibited good technique with therapeutic exercises and is able to correct her form c/ feedback and verbal cues  She required verbal cues during standing rows and extensions, but was able to perform c/ 100% return demonstration following cue  Trialed an extension based program using prolonged prone and repeated extension, patient experienced relief and was negative for peripheralizing c/ flexion following treatment  Plan: Continue per plan of care  Will continue to re-assess patient for extension preference and progress c/ extension based exercises as able  Precautions: None      Date      Manuals        LAD        Lumbar Roll  Grade V: KS      Sacrel MET KS KS      Upslip/Downslip KS KS      Shotgun        Lateral Hip Distraction Grade III/IV: KS -->      Manual Traction KS KS                              Neuro Re-Ed        Abdominal Hollow   10"x10     Abdominal Isometric --> 5" x 20      Supine Clams 5" x 20  HEP 5" x20 5"x20     Ball Squeeze --> 5" x20      Seated Floss        Prone on Wedge   15'     Prone Press-ups    2x10             Ther Ex        LTRs x30 ea  x30ea  x30ea     SKTC 10" x 10 10"x10 D/c      Piriformis Stretch --> -->      S/Lying Clams   L3 x 20 ea       HS Stretch        Hip Flexor Stretch Abdominal Hollow March        Open Book        Plank        Birddog UE/LE        Leg Press        CC Rows and Ext  P1, 2x10 P2 2x10     Seated Flexion/SB --> 10" x 10 D/c     Hand/Heel Rock: FLX & EXT 5" x 10 ea  way 5" x20 ea  way 5" x 20 ext only     Stand: Ext/Abd --> -->      Bike/Treadmill                                Ther Activity        Chop/Lift        Bridge        Box Lift        Wall Squat        Lunge        Sit to Stand                        Modalities        CP    10'

## 2021-09-13 ENCOUNTER — OFFICE VISIT (OUTPATIENT)
Dept: PHYSICAL THERAPY | Facility: CLINIC | Age: 51
End: 2021-09-13
Payer: COMMERCIAL

## 2021-09-13 DIAGNOSIS — M54.16 ACUTE RIGHT LUMBAR RADICULOPATHY: Primary | ICD-10-CM

## 2021-09-13 PROCEDURE — 97110 THERAPEUTIC EXERCISES: CPT

## 2021-09-13 PROCEDURE — 97012 MECHANICAL TRACTION THERAPY: CPT

## 2021-09-13 PROCEDURE — 97112 NEUROMUSCULAR REEDUCATION: CPT

## 2021-09-13 NOTE — PROGRESS NOTES
Daily Note     Today's date: 2021  Patient name: Katja Nieves  : 1970  MRN: [id number]  Referring provider: Valentino Somerset, DO  Dx:   Encounter Diagnosis     ICD-10-CM    1  Acute right lumbar radiculopathy  M54 16        Start Time: 163  Stop Time: 173  Total time in clinic (min): 60 minutes    Subjective: Patient reports since changing to extension preference focus has decreased intensity and frequency down her right LE, but continues to have numbness in groin area  She tried to sleep on her stomach, but couldn't sleep in that position  Objective: See treatment diary below      Assessment: Tolerated treatment well  Patient exhibited good technique with therapeutic exercises  Continued emphasis on extension, which patient continues to respond favorably too  Flexion continues to cause increase pain  Also introduced prone traction, which patient reports improved low back symptoms and stretched her out  Plan: Continue per plan of care  Will re-assess next visit if any changes occurred following traction and continue c/ extension based program       Precautions: None      Date     Manuals        LAD    Grade III: KS    Lumbar Roll  Grade V: KS      Sacrel MET KS KS      Upslip/Downslip KS KS      Shotgun        Lateral Hip Distraction Grade III/IV: KS -->      Manual Traction KS KS                              Neuro Re-Ed        Abdominal Hollow   10"x10     Abdominal Isometric --> 5" x 20  5"x20    Supine Clams 5" x 20  HEP 5" x20 5"x20 L3 5"x20    Ball Squeeze --> 5" x20  10"x10    Seated Floss        Prone on Wedge   15' 15'    Prone Press-ups    2x10 2x10            Ther Ex        LTRs x30 ea  x30ea  x30ea     SKTC 10" x 10 10"x10 D/c      Piriformis Stretch --> -->      S/Lying Clams   L3 x 20 ea  L3 x20ea      HS Stretch        Hip Flexor Stretch        Abdominal Hollow March        Open Book        Plank        Birddog UE/LE        Leg Press        CC Rows and Ext  P1, 2x10 P2 2x10     Seated Flexion/SB --> 10" x 10 D/c     Hand/Heel Rock: FLX & EXT 5" x 10 ea  way 5" x20 ea  way 5" x 20 ext only 5"x10 ext only    Stand: Ext/Abd --> -->      Bike/Treadmill                                Ther Activity        Chop/Lift        Bridge        Box Lift        Wall Squat        Lunge        Sit to Stand                        Modalities        CP    10'     MHP    10' c/ prone on wedge    Traction    75# 12' Static  Prone

## 2021-09-15 ENCOUNTER — APPOINTMENT (OUTPATIENT)
Dept: PHYSICAL THERAPY | Facility: CLINIC | Age: 51
End: 2021-09-15
Payer: COMMERCIAL

## 2021-09-16 ENCOUNTER — OFFICE VISIT (OUTPATIENT)
Dept: PHYSICAL THERAPY | Facility: CLINIC | Age: 51
End: 2021-09-16
Payer: COMMERCIAL

## 2021-09-16 DIAGNOSIS — M54.16 ACUTE RIGHT LUMBAR RADICULOPATHY: Primary | ICD-10-CM

## 2021-09-16 PROCEDURE — 97112 NEUROMUSCULAR REEDUCATION: CPT

## 2021-09-16 PROCEDURE — 97110 THERAPEUTIC EXERCISES: CPT

## 2021-09-16 PROCEDURE — 97140 MANUAL THERAPY 1/> REGIONS: CPT

## 2021-09-16 NOTE — PROGRESS NOTES
Daily Note     Today's date: 2021  Patient name: Archie Crandall  : 1970  MRN: [id number]  Referring provider: Silvestre Reyes DO  Dx:   Encounter Diagnosis     ICD-10-CM    1  Acute right lumbar radiculopathy  M54 16        Start Time: 1630  Stop Time: 1730  Total time in clinic (min): 60 minutes    Subjective: Patient reports following her last PT session she had significant soreness and pain in her low back and even pain in her right lateral hip, the pain subsided after a day  She notes she really only gets complete relief of pain (numbness remains present) during traction  Objective: See treatment diary below      Assessment: Tolerated treatment well  Patient exhibited good technique with therapeutic exercises, she requires verbal cues to perform LTRs c/in pain-free range  Held traction today secondary to significant increase in pain following visit last session  She continues to present c/ numbness encompassing right thigh and groin region, buts notes it has not extended past the knee since beginning therapy  Plan: Continue per plan of care  Will continue to work within extension preference, re-assess to see if traction indicated again  Precautions: None      Date    Manuals        LAD    Grade III: KS Grade V, lumbar bias: KS   Lumbar Roll  Grade V: KS      Sacrel MET KS KS      Upslip/Downslip KS KS      Shotgun     KS   Lateral Hip Distraction Grade III/IV: KS -->      Manual Traction KS KS   KS   STM to b/l Lumbar Erector Spinae     KS                   Neuro Re-Ed        Abdominal Hollow   10"x10     Abdominal Isometric --> 5" x 20  5"x20 5"x20   Supine Clams 5" x 20  HEP 5" x20 5"x20 L3 5"x20 L3 5" x20   Ball Squeeze --> 5" x20  10"x10 10"x10   Seated Floss        Prone on Wedge   15' 15' 15'   Prone Press-ups    2x10 2x10 1x10 c/ OP, 1x10 s/ OP           Ther Ex        LTRs x30 ea  x30ea   x30ea  x30ea   SKTC 10" x 10 10"x10 D/c      Piriformis Stretch --> -->      S/Lying Clams   L3 x 20 ea  L3 x20ea  L3 x20ea     HS Stretch        Hip Flexor Stretch        Abdominal Hollow March        Open Book        Plank        Birddog UE/LE        Leg Press        CC Rows and Ext  P1, 2x10 P2 2x10     Seated Flexion/SB --> 10" x 10 D/c     Hand/Heel Rock: FLX & EXT 5" x 10 ea  way 5" x20 ea  way 5" x 20 ext only 5"x10 ext only 5"x10 ext only   Stand: Ext/Abd --> -->      Bike/Treadmill                                Ther Activity        Chop/Lift        Bridge        Box Lift        Wall Squat        Lunge        Sit to Stand                        Modalities        CP    10'     MHP    10' c/ prone on wedge    Traction    75# 12' Static  Prone

## 2021-09-20 ENCOUNTER — OFFICE VISIT (OUTPATIENT)
Dept: PHYSICAL THERAPY | Facility: CLINIC | Age: 51
End: 2021-09-20
Payer: COMMERCIAL

## 2021-09-20 DIAGNOSIS — M54.16 ACUTE RIGHT LUMBAR RADICULOPATHY: Primary | ICD-10-CM

## 2021-09-20 PROCEDURE — 97535 SELF CARE MNGMENT TRAINING: CPT

## 2021-09-20 PROCEDURE — 97112 NEUROMUSCULAR REEDUCATION: CPT

## 2021-09-20 NOTE — PROGRESS NOTES
Daily Note     Today's date: 2021  Patient name: Deven Ruth  : 1970  MRN: [id number]  Referring provider: Mark Cordova DO  Dx:   Encounter Diagnosis     ICD-10-CM    1  Acute right lumbar radiculopathy  M54 16        Start Time: 1630  Stop Time: 1715  Total time in clinic (min): 45 minutes    Subjective: Patient presented to PT noting significant increase in pain following last PT session, noting symptoms gradually improved in the days following  Objective: See treatment diary below      Assessment: Tolerated treatment well  Re-assessed patient secondary to increase in pain following last session, extension and flexion peripheralized symptoms  Side glides also peripheralized symptoms on initial rep; however, repeated side glides eliminated symptoms down the RLE  Symptoms remained decreased, so additionally incorporated standing extensions c/ self OP  Patient exhibited good technique with therapeutic exercises, following cuing on how to perform these new exercises  Due to patient's response to treatment today, provided significant education on postural positioning, avoiding flexion postures, sitting mechanics, sleeping posture  Recommended patient perform side glides as soon as symptoms re-appear in the RLE t/o the day and only perform activities that do not cause or provoke RLE symptoms  Patient verbalized understanding of all education provided  Plan: Continue per plan of care  Will re-assess next visit to see how patient responded to side glides        Precautions: None      Date    Manuals        LAD    Grade III: KS Grade V, lumbar bias: KS   Lumbar Roll  Grade V: KS      Sacrel MET  KS      Upslip/Downslip  KS      Shotgun     KS   Lateral Hip Distraction  -->      Manual Traction  KS   KS   STM to b/l Lumbar Erector Spinae     KS                   Neuro Re-Ed        Abdominal Hollow   10"x10     Abdominal Isometric  5" x 20  5"x20 5"x20   Supine Clams  5" x20 5"x20 L3 5"x20 L3 5" x20   Ball Squeeze  5" x20  10"x10 10"x10   Seated Floss        Prone on Wedge   15' 15' 15'   Prone Press-ups    2x10 2x10 1x10 c/ OP, 1x10 s/ OP   Sitting Mechanics 5'       Sleeping Posture 10'       Gliding Mechanics 15'                               Ther Ex        LTRs  x30ea  x30ea  x30ea   SKTC  10"x10 D/c      Piriformis Stretch  -->      S/Lying Clams   L3 x 20 ea  L3 x20ea  L3 x20ea  HS Stretch        Hip Flexor Stretch        Abdominal Hollow March        Open Book        Plank        Birddog UE/LE        Leg Press        CC Rows and Ext  P1, 2x10 P2 2x10     Seated Flexion/SB  10" x 10 D/c     Hand/Heel Rock: FLX & EXT  5" x20 ea  way 5" x 20 ext only 5"x10 ext only 5"x10 ext only   Stand: Ext/Abd  -->      Bike/Treadmill                                Ther Activity        Chop/Lift        Bridge        Box Lift        Wall Squat        Lunge        Sit to Stand                        Modalities        CP    10'     MHP    10' c/ prone on wedge    Traction    75# 12' Static  Prone    Self-Care: Provided patient c/ an HEP to include side glides, standing back bends  Also included information sheet about centralization of symptoms and education on how to manage back pain at home when not at therapy to promote self-management of her condition  Provided recommendation to avoid and change any position that increases symptoms down the leg

## 2021-09-21 NOTE — PROGRESS NOTES
PT Re-Evaluation     Today's date: 2021  Patient name: Lisstete Mendez  : 1970  MRN: [id number]  Referring provider: Rachna Raymundo DO  Dx:   Encounter Diagnosis     ICD-10-CM    1  Acute right lumbar radiculopathy  M54 16        Start Time: 1633  Stop Time: 171  Total time in clinic (min): 44 minutes    Assessment  Assessment details: Patient's LTGs are not met at this time  Patient continues to demonstrate progress in pain and function as evidenced by decreased LBP and pain eliminated down her right LE; however, she continues to have centralized low back pain, decreased R hip and knee strength, and decreased lumbar mobility  Due to current treatment regimen, have not reintroduced lumbar flexion into daily activities, will continue to assess symptoms and re-introduce flexion when indicated (when not peripheralizing symptoms)  Patient will continue to benefit from skilled PT interventions to address remaining impairments and functional limitations  Impairments: abnormal gait, abnormal or restricted ROM, activity intolerance, impaired physical strength and pain with function    Symptom irritability: moderate   Prognosis: good    Goals  STG 1: Patient's lumbar mobility will improve by 5 degrees in all directions in 2 weeks demonstrating improved back mobility and decreased muscle tension  Not Met  STG 2: Patient will demonstrate improved hip flexor strength on the right in 2 weeks to demonstrate centralization of symptoms and improved motor control  Met  STG 3: Patient will self-report ability to sit/drive for 10 minutes with <=3/10 in 2 weeks to demonstrate improved functional capacity for ADLs  Met  LTG 1: Patient will demonstrate equal quad and hamstring strength bilaterally in 4 weeks to demonstrate functional capacity for gait  Not Met  LTG 2: Patient will self-report ability to drive with no symptoms in 4 weeks to demonstrate improved functional capacity for ADLs  Not Met    LTG 3: Patient will be independent with HEP in 4 weeks to allow independent management of condition  Not Met  Plan  Plan details: TE, NMR, TA, MT, self-care, and modalities as needed in order to progress through skilled PT focused on ROM, strength  Patient would benefit from: skilled physical therapy  Planned modality interventions: cryotherapy and thermotherapy: hydrocollator packs  Planned therapy interventions: balance, flexibility, functional ROM exercises, gait training, home exercise program, therapeutic exercise, therapeutic activities, stretching, strengthening, self care, patient education, postural training, neuromuscular re-education, manual therapy, massage and joint mobilization  Frequency: 2x week  Duration in weeks: 4  Treatment plan discussed with: patient        Subjective Evaluation    History of Present Illness  Mechanism of injury: Patient reports 30% improvement in her LBP and right radicular pain since the IE  She has noticed improvements in right radicular leg pain, ability to self-reduce back pain  Patient continues to have low back pain (which is centralizing but still present), and pain c/ flexion postures (sitting, prolonged drives, getting into and out of car)  Patient reports she has continued to manage the RLE pain c/ side glides f/b SBBs to decrease and centralize LBP c/ good success  Her LBP has been increased and still requiring OTC pain meds (she trialed a day without any meds and was in significant pain)     Pain  Current pain ratin  At best pain ratin  At worst pain rating: 10  Quality: dull ache and sharp  Progression: improved      Diagnostic Tests  X-ray: normal  Treatments  Previous treatment: chiropractic  Patient Goals  Patient goals for therapy: increased strength, return to sport/leisure activities, decreased pain and increased motion          Objective     General Comments:      Lumbar Comments  Observation:     -Gait: moderate trendelenburg on right, antalgic gait on right    TTP: inferior to R PSIS and R erector spinae, R thigh (anterior and posterior) feels "bruised"    Neuro Screen:  Clonus (R/L): - / -  DTRs:      -L3/4 (Patellar, R/L): 1+ / 1+     -S1 (Achilles tendon; R/L): 2+ / 2+    Lumbar ROM (degrees):   FLX: 50  EXT: 15  SB (R/L): 25 / 20    Lumbar/SI/Hip Special Tests:  Slump (R/L): + / -  Passive SLR (R/L): - / -,crossed SLR (-) b/l  Thigh Thrust (R/L): - / -  Scour (R/L): - / -  BECK (R/L): + for lateral hip pain / -    Hip ROM (degrees):  ER (R/L): 35 / 38  IR (R/L): 41 / 38    Hip Strength (MMT Grades):  FLX (Supine): 4- / 5  ABD (Side-lying, R/L): 4+ / 5    Knee Strength (lbs):  EXT (R/L): 38 / 52  FLX (R/L): 30 5 / 43     Precautions: None      Date 9/20 9/22 9/8 9/13 9/16   Manuals        LAD    Grade III: KS Grade V, lumbar bias: KS   Lumbar Roll        Sacrel MET        Upslip/Downslip        Shotgun     KS   Lateral Hip Distraction        Manual Traction     KS   STM to b/l Lumbar Erector Spinae     KS                   Neuro Re-Ed        Abdominal Hollow   10"x10     Abdominal Isometric    5"x20 5"x20   Supine Clams   5"x20 L3 5"x20 L3 5" x20   Ball Squeeze    10"x10 10"x10   Seated Floss        Prone on Wedge   15' 15' 15'   Prone Press-ups    2x10 2x10 1x10 c/ OP, 1x10 s/ OP   Sitting Mechanics 5' 5'      Sleeping Posture 10' 3'      Gliding Mechanics 15' x30 Right Side [place] c/ education 5'      SBB c/ self OP  x30                       Ther Ex        LTRs   x30ea  x30ea   SKTC   D/c      Piriformis Stretch        S/Lying Clams   L3 x 20 ea  L3 x20ea  L3 x20ea     HS Stretch        Hip Flexor Stretch        Abdominal Hollow March        Open Book        Plank        Birddog UE/LE        Leg Press        CC Rows and Ext   P2 2x10     Seated Flexion/SB   D/c     Hand/Heel Rock: FLX & EXT   5" x 20 ext only 5"x10 ext only 5"x10 ext only   Stand: Ext/Abd        Bike/Treadmill                                Ther Activity        Chop/Lift        Bridge Box Lift        Wall Squat        Lunge        Sit to Stand                        Modalities        CP    10'     MHP    10' c/ prone on wedge    Traction    75# 12' Static  Prone    Self-Care: Provided patient c/ continued education on her HEP of side glides, standing back bends  Continued to re-enforce limiting/eliminating flexion postures from ADLs  Also, provided recommendation to avoid and change any position that increases symptoms down the leg  When symptoms appear, educated patient to begin c/ side glides and, once symptoms in RLE disappear, to continue c/ SBB

## 2021-09-22 ENCOUNTER — EVALUATION (OUTPATIENT)
Dept: PHYSICAL THERAPY | Facility: CLINIC | Age: 51
End: 2021-09-22
Payer: COMMERCIAL

## 2021-09-22 DIAGNOSIS — M54.16 ACUTE RIGHT LUMBAR RADICULOPATHY: Primary | ICD-10-CM

## 2021-09-22 PROCEDURE — 97535 SELF CARE MNGMENT TRAINING: CPT

## 2021-09-22 PROCEDURE — 97112 NEUROMUSCULAR REEDUCATION: CPT

## 2021-09-27 ENCOUNTER — OFFICE VISIT (OUTPATIENT)
Dept: PHYSICAL THERAPY | Facility: CLINIC | Age: 51
End: 2021-09-27
Payer: COMMERCIAL

## 2021-09-27 DIAGNOSIS — M54.16 ACUTE RIGHT LUMBAR RADICULOPATHY: Primary | ICD-10-CM

## 2021-09-27 PROCEDURE — 97110 THERAPEUTIC EXERCISES: CPT

## 2021-09-27 PROCEDURE — 97112 NEUROMUSCULAR REEDUCATION: CPT

## 2021-09-28 ENCOUNTER — OFFICE VISIT (OUTPATIENT)
Dept: FAMILY MEDICINE CLINIC | Facility: CLINIC | Age: 51
End: 2021-09-28
Payer: COMMERCIAL

## 2021-09-28 VITALS
BODY MASS INDEX: 30.29 KG/M2 | TEMPERATURE: 96.8 F | WEIGHT: 193 LBS | SYSTOLIC BLOOD PRESSURE: 142 MMHG | DIASTOLIC BLOOD PRESSURE: 88 MMHG | HEIGHT: 67 IN

## 2021-09-28 DIAGNOSIS — M54.16 ACUTE RIGHT LUMBAR RADICULOPATHY: ICD-10-CM

## 2021-09-28 DIAGNOSIS — Z12.12 SCREENING FOR COLORECTAL CANCER: Primary | ICD-10-CM

## 2021-09-28 DIAGNOSIS — Z12.11 SCREENING FOR COLORECTAL CANCER: Primary | ICD-10-CM

## 2021-09-28 DIAGNOSIS — Z23 NEED FOR INFLUENZA VACCINATION: Primary | ICD-10-CM

## 2021-09-28 PROCEDURE — 99213 OFFICE O/P EST LOW 20 MIN: CPT | Performed by: FAMILY MEDICINE

## 2021-09-28 PROCEDURE — 1036F TOBACCO NON-USER: CPT | Performed by: FAMILY MEDICINE

## 2021-09-28 PROCEDURE — 90682 RIV4 VACC RECOMBINANT DNA IM: CPT | Performed by: FAMILY MEDICINE

## 2021-09-28 PROCEDURE — 3008F BODY MASS INDEX DOCD: CPT | Performed by: FAMILY MEDICINE

## 2021-09-28 PROCEDURE — 90471 IMMUNIZATION ADMIN: CPT | Performed by: FAMILY MEDICINE

## 2021-09-28 NOTE — PROGRESS NOTES
Assessment/Plan:    Problem List Items Addressed This Visit     None      Visit Diagnoses     Need for influenza vaccination    -  Primary    Relevant Orders    influenza vaccine, quadrivalent, recombinant, PF, 0 5 mL, for patients 18 yr+ (FLUBLOK) (Completed)    Acute right lumbar radiculopathy               Diagnoses and all orders for this visit:    Need for influenza vaccination  -     influenza vaccine, quadrivalent, recombinant, PF, 0 5 mL, for patients 18 yr+ (FLUBLOK)    Acute right lumbar radiculopathy        No problem-specific Assessment & Plan notes found for this encounter  Subjective:      Patient ID: Lissette Mendez is a 44 y o  female  Patient has completed 6 sessions of physical therapy with no improvement and in fact worsening of her right sciatic lumbar disc radiculopathy patient has numbness of the entire thigh laterally she has some weakness of strength in the right leg if she sits in a chair she has difficulty getting up because of pain and weakness of the hip extensors patient has taken her medications red faithfully religiously with no improvement in her symptomatology her original therapist has consulted with the chief therapist at the facility they have changed modalities without relief      The following portions of the patient's history were reviewed and updated as appropriate:   She has no past medical history on file  ,  does not have any pertinent problems on file  ,   has a past surgical history that includes Cholecystectomy (1995); Tooth extraction; Hysterectomy; and Breast biopsy (Left, 2009)  ,  family history includes Alzheimer's disease in her mother; Breast cancer (age of onset: 39) in her cousin; Emphysema in her maternal aunt;  Heart disease in her father; Lung cancer in her maternal grandfather, mother, and paternal grandfather; No Known Problems in her daughter, paternal grandmother, sister, sister, and sister; Prostate cancer in her father; Tongue cancer in her maternal grandmother  ,   reports that she has never smoked  She has never used smokeless tobacco  She reports current alcohol use  She reports that she does not use drugs  ,  is allergic to codeine     Current Outpatient Medications   Medication Sig Dispense Refill    Calcium-Vitamin D-Vitamin K (VIACTIV PO) Take by mouth daily      cetirizine (ZyrTEC) 10 MG chewable tablet Chew 10 mg daily      Cholecalciferol (RA VITAMIN D-3) 2000 units CAPS Take by mouth daily      desvenlafaxine succinate (PRISTIQ) 50 mg 24 hr tablet TAKE 1 TABLET DAILY 90 tablet 3    fexofenadine (ALLEGRA) 180 MG tablet Take 1 tablet by mouth daily      Glucosamine-Chondroitin (GLUCOSAMINE CHONDR COMPLEX PO) Take by mouth daily With Tumeric      ibuprofen (MOTRIN) 600 mg tablet Take 1 tablet (600 mg total) by mouth every 8 (eight) hours as needed for mild pain Take 1 ibuprofen with 2 extra-strength 500 mg Tylenol 3 times daily 60 tablet 0    metoprolol succinate (TOPROL-XL) 50 mg 24 hr tablet TAKE 1 TABLET DAILY (Patient taking differently: 100 mg ) 90 tablet 3    Multiple Vitamin (MULTIVITAMIN) tablet Take 1 tablet by mouth daily      rosuvastatin (CRESTOR) 10 MG tablet Take 1 tablet (10 mg total) by mouth daily 90 tablet 3    cyclobenzaprine (FLEXERIL) 10 mg tablet Take 1 tablet (10 mg total) by mouth 3 (three) times a day (Patient not taking: Reported on 9/28/2021) 20 tablet 0    hydrochlorothiazide (HYDRODIURIL) 12 5 mg tablet Take 1 tablet (12 5 mg total) by mouth daily (Patient not taking: Reported on 9/28/2021) 30 tablet 5    Na Sulfate-K Sulfate-Mg Sulf (Suprep Bowel Prep Kit) 17 5-3 13-1 6 GM/177ML SOLN Take as directed by the office  (Patient not taking: Reported on 9/28/2021) 2 Bottle 0     No current facility-administered medications for this visit  Review of Systems   Constitutional: Negative for activity change, appetite change, diaphoresis, fatigue and fever  HENT: Negative  Eyes: Negative      Respiratory: Negative for apnea, cough, chest tightness, shortness of breath and wheezing  Cardiovascular: Negative for chest pain, palpitations and leg swelling  Gastrointestinal: Negative for abdominal distention, abdominal pain, anal bleeding, constipation, diarrhea, nausea and vomiting  Endocrine: Negative for cold intolerance, heat intolerance, polydipsia, polyphagia and polyuria  Genitourinary: Negative for difficulty urinating, dysuria, flank pain, hematuria and urgency  Musculoskeletal: Positive for back pain  Negative for arthralgias, gait problem, joint swelling and myalgias  Right lumbar radiculopathy   Skin: Negative for color change, rash and wound  Allergic/Immunologic: Negative for environmental allergies, food allergies and immunocompromised state  Neurological: Positive for weakness and numbness  Negative for dizziness, seizures, syncope, speech difficulty and headaches  Numbness right thigh so far no muscle atrophy but patient has weakness   Hematological: Negative for adenopathy  Does not bruise/bleed easily  Psychiatric/Behavioral: Negative for agitation, behavioral problems, hallucinations, sleep disturbance and suicidal ideas  Objective:  Vitals:    09/28/21 1553   BP: 142/88   BP Location: Left arm   Patient Position: Sitting   Cuff Size: Standard   Temp: (!) 96 8 °F (36 °C)   TempSrc: Temporal   Weight: 87 5 kg (193 lb)   Height: 5' 7" (1 702 m)     Body mass index is 30 23 kg/m²  Physical Exam  Constitutional:       General: She is not in acute distress  Appearance: She is well-developed  She is not diaphoretic  HENT:      Head: Normocephalic  Right Ear: External ear normal       Left Ear: External ear normal       Nose: Nose normal    Eyes:      General: No scleral icterus  Right eye: No discharge  Left eye: No discharge  Conjunctiva/sclera: Conjunctivae normal       Pupils: Pupils are equal, round, and reactive to light     Neck: Thyroid: No thyromegaly  Trachea: No tracheal deviation  Cardiovascular:      Rate and Rhythm: Normal rate and regular rhythm  Heart sounds: Normal heart sounds  No murmur heard  No friction rub  No gallop  Pulmonary:      Effort: Pulmonary effort is normal  No respiratory distress  Breath sounds: Normal breath sounds  No wheezing  Abdominal:      General: Bowel sounds are normal       Palpations: Abdomen is soft  There is no mass  Tenderness: There is no abdominal tenderness  There is no guarding  Musculoskeletal:         General: No deformity  Cervical back: Normal range of motion  Comments: Positive straight leg raising about 30 degrees  Weakness plantar flexion right foot   Lymphadenopathy:      Cervical: No cervical adenopathy  Skin:     General: Skin is warm and dry  Findings: No erythema or rash  Neurological:      Mental Status: She is alert and oriented to person, place, and time  Cranial Nerves: No cranial nerve deficit  Psychiatric:         Thought Content:  Thought content normal

## 2021-09-30 ENCOUNTER — OFFICE VISIT (OUTPATIENT)
Dept: PHYSICAL THERAPY | Facility: CLINIC | Age: 51
End: 2021-09-30
Payer: COMMERCIAL

## 2021-09-30 DIAGNOSIS — M54.16 ACUTE RIGHT LUMBAR RADICULOPATHY: Primary | ICD-10-CM

## 2021-09-30 PROCEDURE — 97110 THERAPEUTIC EXERCISES: CPT

## 2021-09-30 PROCEDURE — 97140 MANUAL THERAPY 1/> REGIONS: CPT

## 2021-09-30 PROCEDURE — 97112 NEUROMUSCULAR REEDUCATION: CPT

## 2021-09-30 NOTE — PROGRESS NOTES
Daily Note     Today's date: 2021  Patient name: Alisha Madison  : 1970  MRN: [id number]  Referring provider: Tarah Garcia DO  Dx:   Encounter Diagnosis     ICD-10-CM    1  Acute right lumbar radiculopathy  M54 16        Start Time: 162  Stop Time: 1721  Total time in clinic (min): 55 minutes    Subjective: Patient continues to report low back pain has increased; however, symptoms in her RLE are not constant anymore  She only experiences pain in her RLE when she spends a significant amount of time driving or sitting down  She notes she experienced muscle soreness following re-introduction of core muscle and glute muscles exercises last visit; however, no increase in back or RLE pain  Patient had a doctor's appointment yesterday for f/u with her LBP, patient reports her doctor ordered an MRI on 10/5  Objective: See treatment diary below      Assessment: Tolerated treatment well  Patient demonstrated fatigue post treatment and exhibited good technique with therapeutic exercises, continued c/ gentle core and glute musculature strengthening  Patient responded well to [facility] to b/l erector spinae, demonstrating decreased muscle hypertonicity and improved gait mechanics  Patient experienced mild increase in LBP during hip flexor stretch, but noted it resolved immediately following stretch  Plan: Continue per plan of care  Will continue to cautiously progress core and glute strengthening while continuing c/ repeated motions        Precautions: None      Date     Manuals        LAD        Lumbar Roll        Sacrel MET        Upslip/Downslip        Shotgun        Lateral Hip Distraction        Manual Traction        STM to b/l Lumbar Erector Spinae    KS in prone                    Neuro Re-Ed        Abdominal Hollow        Abdominal Isometric   10"x10 10"x10    Supine Clams   5"x20 L4 5"x20    Ball Squeeze   5"x20 10"x10    Seated Floss        Prone on Wedge        Prone Press-ups Sitting Mechanics 5' 5'      Sleeping Posture 10' 3'      Gliding Mechanics 15' x30 Right Side Glide c/ education 5' x30 Right Side Glide c/ education 5' x30 Right Side Glide    SBB c/ self OP  x30  x30 x30                    Ther Ex        LTRs        SKTC         Piriformis Stretch        S/Lying Clams   L2 x 20 ea  L4 x20ea      HS Stretch        Hip Flexor Stretch    20"x3 b/l    Abdominal Hollow March        Open Book        Plank        Birddog UE/LE        Leg Press        CC Rows and Ext   L3 2x10 L3 2x10    Seated Flexion/SB        Hand/Heel Rock: FLX & EXT        Stand: Ext/Abd        Bike/Treadmill                                Ther Activity        Chop/Lift        Bridge        Box Lift        Wall Squat        Lunge        Sit to Stand                        Modalities        CP         MHP    10' c/ prone on wedge    Traction    75# 12' Static  Prone

## 2021-10-04 ENCOUNTER — OFFICE VISIT (OUTPATIENT)
Dept: PHYSICAL THERAPY | Facility: CLINIC | Age: 51
End: 2021-10-04
Payer: COMMERCIAL

## 2021-10-04 DIAGNOSIS — M54.16 ACUTE RIGHT LUMBAR RADICULOPATHY: Primary | ICD-10-CM

## 2021-10-04 PROCEDURE — 97110 THERAPEUTIC EXERCISES: CPT

## 2021-10-04 PROCEDURE — 97140 MANUAL THERAPY 1/> REGIONS: CPT

## 2021-10-04 PROCEDURE — 97112 NEUROMUSCULAR REEDUCATION: CPT

## 2021-10-05 ENCOUNTER — APPOINTMENT (OUTPATIENT)
Dept: PHYSICAL THERAPY | Facility: CLINIC | Age: 51
End: 2021-10-05
Payer: COMMERCIAL

## 2021-10-05 ENCOUNTER — HOSPITAL ENCOUNTER (OUTPATIENT)
Dept: MRI IMAGING | Facility: HOSPITAL | Age: 51
Discharge: HOME | End: 2021-10-05
Attending: FAMILY MEDICINE
Payer: COMMERCIAL

## 2021-10-05 DIAGNOSIS — M54.16 ACUTE RIGHT LUMBAR RADICULOPATHY: ICD-10-CM

## 2021-10-05 PROCEDURE — G1004 CDSM NDSC: HCPCS

## 2021-10-05 PROCEDURE — 72148 MRI LUMBAR SPINE W/O DYE: CPT

## 2021-10-07 ENCOUNTER — OFFICE VISIT (OUTPATIENT)
Dept: PHYSICAL THERAPY | Facility: CLINIC | Age: 51
End: 2021-10-07
Payer: COMMERCIAL

## 2021-10-07 DIAGNOSIS — M54.16 ACUTE RIGHT LUMBAR RADICULOPATHY: Primary | ICD-10-CM

## 2021-10-07 PROCEDURE — 97112 NEUROMUSCULAR REEDUCATION: CPT

## 2021-10-07 PROCEDURE — 97140 MANUAL THERAPY 1/> REGIONS: CPT

## 2021-10-07 PROCEDURE — 97110 THERAPEUTIC EXERCISES: CPT

## 2021-10-11 ENCOUNTER — OFFICE VISIT (OUTPATIENT)
Dept: PHYSICAL THERAPY | Facility: CLINIC | Age: 51
End: 2021-10-11
Payer: COMMERCIAL

## 2021-10-11 DIAGNOSIS — M54.16 ACUTE RIGHT LUMBAR RADICULOPATHY: Primary | ICD-10-CM

## 2021-10-11 PROCEDURE — 97110 THERAPEUTIC EXERCISES: CPT

## 2021-10-11 PROCEDURE — 97140 MANUAL THERAPY 1/> REGIONS: CPT

## 2021-10-11 PROCEDURE — 97112 NEUROMUSCULAR REEDUCATION: CPT

## 2021-10-12 DIAGNOSIS — M54.16 LUMBAR RADICULOPATHY, ACUTE: ICD-10-CM

## 2021-10-12 RX ORDER — IBUPROFEN 600 MG/1
600 TABLET ORAL EVERY 8 HOURS PRN
Qty: 60 TABLET | Refills: 0 | Status: SHIPPED | OUTPATIENT
Start: 2021-10-12 | End: 2021-11-30 | Stop reason: SDUPTHER

## 2021-10-13 ENCOUNTER — OFFICE VISIT (OUTPATIENT)
Dept: PHYSICAL THERAPY | Facility: CLINIC | Age: 51
End: 2021-10-13
Payer: COMMERCIAL

## 2021-10-13 DIAGNOSIS — M54.16 ACUTE RIGHT LUMBAR RADICULOPATHY: Primary | ICD-10-CM

## 2021-10-13 PROCEDURE — 97112 NEUROMUSCULAR REEDUCATION: CPT

## 2021-10-13 PROCEDURE — 97110 THERAPEUTIC EXERCISES: CPT

## 2021-10-13 PROCEDURE — 97140 MANUAL THERAPY 1/> REGIONS: CPT

## 2021-10-19 ENCOUNTER — APPOINTMENT (OUTPATIENT)
Dept: PHYSICAL THERAPY | Facility: CLINIC | Age: 51
End: 2021-10-19
Payer: COMMERCIAL

## 2021-10-20 ENCOUNTER — CONSULT (OUTPATIENT)
Dept: PAIN MEDICINE | Facility: CLINIC | Age: 51
End: 2021-10-20
Payer: COMMERCIAL

## 2021-10-20 VITALS
RESPIRATION RATE: 20 BRPM | SYSTOLIC BLOOD PRESSURE: 140 MMHG | BODY MASS INDEX: 30.61 KG/M2 | TEMPERATURE: 97.4 F | HEIGHT: 67 IN | HEART RATE: 78 BPM | DIASTOLIC BLOOD PRESSURE: 72 MMHG | WEIGHT: 195 LBS

## 2021-10-20 DIAGNOSIS — M54.16 LUMBAR RADICULOPATHY, CHRONIC: ICD-10-CM

## 2021-10-20 PROCEDURE — 1036F TOBACCO NON-USER: CPT | Performed by: ANESTHESIOLOGY

## 2021-10-20 PROCEDURE — 3008F BODY MASS INDEX DOCD: CPT | Performed by: FAMILY MEDICINE

## 2021-10-20 PROCEDURE — 99244 OFF/OP CNSLTJ NEW/EST MOD 40: CPT | Performed by: ANESTHESIOLOGY

## 2021-10-20 PROCEDURE — 3008F BODY MASS INDEX DOCD: CPT | Performed by: ANESTHESIOLOGY

## 2021-10-20 RX ORDER — ACETAMINOPHEN 160 MG/5ML
15 SOLUTION ORAL EVERY 4 HOURS PRN
COMMUNITY
End: 2021-12-17

## 2021-10-20 RX ORDER — 0.9 % SODIUM CHLORIDE 0.9 %
1 VIAL (ML) INJECTION ONCE
Status: CANCELLED | OUTPATIENT
Start: 2021-10-20 | End: 2021-10-20

## 2021-10-20 RX ORDER — LIDOCAINE HYDROCHLORIDE 10 MG/ML
10 INJECTION, SOLUTION EPIDURAL; INFILTRATION; INTRACAUDAL; PERINEURAL ONCE
Status: CANCELLED | OUTPATIENT
Start: 2021-10-20 | End: 2021-10-20

## 2021-10-20 RX ORDER — GABAPENTIN 100 MG/1
100 CAPSULE ORAL 3 TIMES DAILY
Qty: 90 CAPSULE | Refills: 0 | Status: SHIPPED | OUTPATIENT
Start: 2021-10-20 | End: 2021-12-17

## 2021-10-20 RX ORDER — ACETAMINOPHEN 325 MG/1
650 TABLET ORAL EVERY 6 HOURS PRN
COMMUNITY
End: 2022-02-28

## 2021-10-21 ENCOUNTER — EVALUATION (OUTPATIENT)
Dept: PHYSICAL THERAPY | Facility: CLINIC | Age: 51
End: 2021-10-21
Payer: COMMERCIAL

## 2021-10-21 DIAGNOSIS — M54.16 ACUTE RIGHT LUMBAR RADICULOPATHY: Primary | ICD-10-CM

## 2021-10-21 PROCEDURE — 97112 NEUROMUSCULAR REEDUCATION: CPT

## 2021-10-21 PROCEDURE — 97140 MANUAL THERAPY 1/> REGIONS: CPT

## 2021-10-21 PROCEDURE — 97110 THERAPEUTIC EXERCISES: CPT

## 2021-10-26 ENCOUNTER — APPOINTMENT (OUTPATIENT)
Dept: PHYSICAL THERAPY | Facility: CLINIC | Age: 51
End: 2021-10-26
Payer: COMMERCIAL

## 2021-11-02 ENCOUNTER — OFFICE VISIT (OUTPATIENT)
Dept: PHYSICAL THERAPY | Facility: CLINIC | Age: 51
End: 2021-11-02
Payer: COMMERCIAL

## 2021-11-02 DIAGNOSIS — M54.16 ACUTE RIGHT LUMBAR RADICULOPATHY: Primary | ICD-10-CM

## 2021-11-02 PROCEDURE — 97140 MANUAL THERAPY 1/> REGIONS: CPT

## 2021-11-02 PROCEDURE — 97112 NEUROMUSCULAR REEDUCATION: CPT

## 2021-11-02 PROCEDURE — 97110 THERAPEUTIC EXERCISES: CPT

## 2021-11-08 ENCOUNTER — TELEPHONE (OUTPATIENT)
Dept: FAMILY MEDICINE CLINIC | Facility: CLINIC | Age: 51
End: 2021-11-08

## 2021-11-08 DIAGNOSIS — F40.243 FEAR OF FLYING: Primary | ICD-10-CM

## 2021-11-08 RX ORDER — ALPRAZOLAM 0.5 MG/1
TABLET ORAL
Qty: 4 TABLET | Refills: 0 | Status: SHIPPED | OUTPATIENT
Start: 2021-11-08 | End: 2021-12-17

## 2021-11-09 ENCOUNTER — APPOINTMENT (OUTPATIENT)
Dept: PHYSICAL THERAPY | Facility: CLINIC | Age: 51
End: 2021-11-09
Payer: COMMERCIAL

## 2021-11-16 ENCOUNTER — APPOINTMENT (OUTPATIENT)
Dept: PHYSICAL THERAPY | Facility: CLINIC | Age: 51
End: 2021-11-16
Payer: COMMERCIAL

## 2021-11-17 ENCOUNTER — TELEPHONE (OUTPATIENT)
Dept: OBGYN CLINIC | Facility: CLINIC | Age: 51
End: 2021-11-17

## 2021-11-23 ENCOUNTER — EVALUATION (OUTPATIENT)
Dept: PHYSICAL THERAPY | Facility: CLINIC | Age: 51
End: 2021-11-23
Payer: COMMERCIAL

## 2021-11-23 DIAGNOSIS — M54.16 ACUTE RIGHT LUMBAR RADICULOPATHY: Primary | ICD-10-CM

## 2021-11-23 PROCEDURE — 97110 THERAPEUTIC EXERCISES: CPT

## 2021-11-23 PROCEDURE — 97112 NEUROMUSCULAR REEDUCATION: CPT

## 2021-11-23 PROCEDURE — 97140 MANUAL THERAPY 1/> REGIONS: CPT

## 2021-11-30 ENCOUNTER — OFFICE VISIT (OUTPATIENT)
Dept: PHYSICAL THERAPY | Facility: CLINIC | Age: 51
End: 2021-11-30
Payer: COMMERCIAL

## 2021-11-30 DIAGNOSIS — M54.16 LUMBAR RADICULOPATHY, ACUTE: ICD-10-CM

## 2021-11-30 DIAGNOSIS — M54.16 ACUTE RIGHT LUMBAR RADICULOPATHY: Primary | ICD-10-CM

## 2021-11-30 PROCEDURE — 97112 NEUROMUSCULAR REEDUCATION: CPT

## 2021-11-30 PROCEDURE — 97110 THERAPEUTIC EXERCISES: CPT

## 2021-11-30 PROCEDURE — 97140 MANUAL THERAPY 1/> REGIONS: CPT

## 2021-12-01 ENCOUNTER — VBI (OUTPATIENT)
Dept: ADMINISTRATIVE | Facility: OTHER | Age: 51
End: 2021-12-01

## 2021-12-01 RX ORDER — IBUPROFEN 600 MG/1
600 TABLET ORAL EVERY 8 HOURS PRN
Qty: 60 TABLET | Refills: 0 | Status: SHIPPED | OUTPATIENT
Start: 2021-12-01 | End: 2022-01-19 | Stop reason: SDUPTHER

## 2021-12-02 ENCOUNTER — HOSPITAL ENCOUNTER (OUTPATIENT)
Facility: HOSPITAL | Age: 51
Setting detail: OUTPATIENT SURGERY
Discharge: HOME | End: 2021-12-02
Attending: ANESTHESIOLOGY | Admitting: ANESTHESIOLOGY
Payer: COMMERCIAL

## 2021-12-02 ENCOUNTER — APPOINTMENT (OUTPATIENT)
Dept: RADIOLOGY | Facility: HOSPITAL | Age: 51
End: 2021-12-02
Payer: COMMERCIAL

## 2021-12-02 VITALS
RESPIRATION RATE: 18 BRPM | TEMPERATURE: 98.2 F | HEART RATE: 68 BPM | BODY MASS INDEX: 30.61 KG/M2 | OXYGEN SATURATION: 98 % | WEIGHT: 195 LBS | SYSTOLIC BLOOD PRESSURE: 168 MMHG | HEIGHT: 67 IN | DIASTOLIC BLOOD PRESSURE: 78 MMHG

## 2021-12-02 PROCEDURE — 64483 NJX AA&/STRD TFRM EPI L/S 1: CPT | Performed by: ANESTHESIOLOGY

## 2021-12-02 PROCEDURE — 64484 NJX AA&/STRD TFRM EPI L/S EA: CPT | Performed by: ANESTHESIOLOGY

## 2021-12-02 PROCEDURE — 76000 FLUOROSCOPY <1 HR PHYS/QHP: CPT

## 2021-12-02 RX ORDER — ALPRAZOLAM 0.5 MG/1
0.5 TABLET ORAL ONCE
Status: COMPLETED | OUTPATIENT
Start: 2021-12-02 | End: 2021-12-02

## 2021-12-02 RX ORDER — LIDOCAINE HYDROCHLORIDE 10 MG/ML
INJECTION, SOLUTION EPIDURAL; INFILTRATION; INTRACAUDAL; PERINEURAL AS NEEDED
Status: DISCONTINUED | OUTPATIENT
Start: 2021-12-02 | End: 2021-12-02 | Stop reason: HOSPADM

## 2021-12-02 RX ORDER — 0.9 % SODIUM CHLORIDE 0.9 %
1 VIAL (ML) INJECTION ONCE
Status: DISCONTINUED | OUTPATIENT
Start: 2021-12-02 | End: 2021-12-02 | Stop reason: HOSPADM

## 2021-12-02 RX ORDER — LIDOCAINE HYDROCHLORIDE 10 MG/ML
10 INJECTION, SOLUTION EPIDURAL; INFILTRATION; INTRACAUDAL; PERINEURAL ONCE
Status: DISCONTINUED | OUTPATIENT
Start: 2021-12-02 | End: 2021-12-02 | Stop reason: HOSPADM

## 2021-12-02 RX ADMIN — ALPRAZOLAM 0.5 MG: 0.5 TABLET ORAL at 08:04

## 2021-12-06 ENCOUNTER — OFFICE VISIT (OUTPATIENT)
Dept: PHYSICAL THERAPY | Facility: CLINIC | Age: 51
End: 2021-12-06
Payer: COMMERCIAL

## 2021-12-06 DIAGNOSIS — M54.16 ACUTE RIGHT LUMBAR RADICULOPATHY: Primary | ICD-10-CM

## 2021-12-06 PROCEDURE — 97530 THERAPEUTIC ACTIVITIES: CPT

## 2021-12-06 PROCEDURE — 97110 THERAPEUTIC EXERCISES: CPT

## 2021-12-06 PROCEDURE — 97140 MANUAL THERAPY 1/> REGIONS: CPT

## 2021-12-17 ENCOUNTER — OFFICE VISIT (OUTPATIENT)
Dept: PAIN MEDICINE | Facility: CLINIC | Age: 51
End: 2021-12-17
Payer: COMMERCIAL

## 2021-12-17 VITALS
TEMPERATURE: 97.7 F | BODY MASS INDEX: 31.2 KG/M2 | HEIGHT: 67 IN | DIASTOLIC BLOOD PRESSURE: 82 MMHG | WEIGHT: 198.8 LBS | SYSTOLIC BLOOD PRESSURE: 140 MMHG | RESPIRATION RATE: 20 BRPM | HEART RATE: 81 BPM

## 2021-12-17 DIAGNOSIS — M54.16 LUMBAR RADICULOPATHY: Primary | ICD-10-CM

## 2021-12-17 PROCEDURE — 3008F BODY MASS INDEX DOCD: CPT | Performed by: ANESTHESIOLOGY

## 2021-12-17 PROCEDURE — 99214 OFFICE O/P EST MOD 30 MIN: CPT | Performed by: ANESTHESIOLOGY

## 2021-12-17 PROCEDURE — 1036F TOBACCO NON-USER: CPT | Performed by: ANESTHESIOLOGY

## 2021-12-17 RX ORDER — LIDOCAINE HYDROCHLORIDE 10 MG/ML
5 INJECTION, SOLUTION EPIDURAL; INFILTRATION; INTRACAUDAL; PERINEURAL ONCE
Status: CANCELLED | OUTPATIENT
Start: 2021-12-17 | End: 2021-12-17

## 2021-12-17 RX ORDER — GABAPENTIN 300 MG/1
300 CAPSULE ORAL 3 TIMES DAILY
Qty: 90 CAPSULE | Refills: 0 | Status: SHIPPED | OUTPATIENT
Start: 2021-12-17 | End: 2022-01-19 | Stop reason: SDUPTHER

## 2021-12-17 RX ORDER — 0.9 % SODIUM CHLORIDE 0.9 %
3 VIAL (ML) INJECTION ONCE
Status: CANCELLED | OUTPATIENT
Start: 2021-12-17 | End: 2021-12-17

## 2021-12-17 RX ORDER — METHYLPREDNISOLONE ACETATE 80 MG/ML
80 INJECTION, SUSPENSION INTRA-ARTICULAR; INTRALESIONAL; INTRAMUSCULAR; PARENTERAL; SOFT TISSUE ONCE
Status: CANCELLED | OUTPATIENT
Start: 2021-12-17 | End: 2021-12-17

## 2021-12-21 ENCOUNTER — EVALUATION (OUTPATIENT)
Dept: PHYSICAL THERAPY | Facility: CLINIC | Age: 51
End: 2021-12-21
Payer: COMMERCIAL

## 2021-12-21 DIAGNOSIS — M54.16 ACUTE RIGHT LUMBAR RADICULOPATHY: Primary | ICD-10-CM

## 2021-12-21 PROCEDURE — 97112 NEUROMUSCULAR REEDUCATION: CPT

## 2021-12-21 PROCEDURE — 97110 THERAPEUTIC EXERCISES: CPT

## 2021-12-21 PROCEDURE — 97140 MANUAL THERAPY 1/> REGIONS: CPT

## 2021-12-23 ENCOUNTER — TELEPHONE (OUTPATIENT)
Dept: PAIN MEDICINE | Facility: CLINIC | Age: 51
End: 2021-12-23

## 2021-12-23 DIAGNOSIS — M54.16 LUMBAR RADICULOPATHY: Primary | ICD-10-CM

## 2021-12-23 RX ORDER — GABAPENTIN 100 MG/1
100 CAPSULE ORAL 3 TIMES DAILY
Qty: 90 CAPSULE | Refills: 0 | Status: SHIPPED | OUTPATIENT
Start: 2021-12-23 | End: 2022-01-19

## 2021-12-28 ENCOUNTER — HOSPITAL ENCOUNTER (OUTPATIENT)
Facility: HOSPITAL | Age: 51
Setting detail: OUTPATIENT SURGERY
Discharge: HOME | End: 2021-12-28
Attending: ANESTHESIOLOGY | Admitting: ANESTHESIOLOGY
Payer: COMMERCIAL

## 2021-12-28 ENCOUNTER — APPOINTMENT (OUTPATIENT)
Dept: RADIOLOGY | Facility: HOSPITAL | Age: 51
End: 2021-12-28
Payer: COMMERCIAL

## 2021-12-28 VITALS
TEMPERATURE: 98.1 F | DIASTOLIC BLOOD PRESSURE: 96 MMHG | HEIGHT: 67 IN | WEIGHT: 198 LBS | HEART RATE: 84 BPM | BODY MASS INDEX: 31.08 KG/M2 | OXYGEN SATURATION: 94 % | SYSTOLIC BLOOD PRESSURE: 134 MMHG | RESPIRATION RATE: 20 BRPM

## 2021-12-28 PROCEDURE — 62323 NJX INTERLAMINAR LMBR/SAC: CPT | Performed by: ANESTHESIOLOGY

## 2021-12-28 RX ORDER — LIDOCAINE HYDROCHLORIDE 10 MG/ML
INJECTION, SOLUTION EPIDURAL; INFILTRATION; INTRACAUDAL; PERINEURAL AS NEEDED
Status: DISCONTINUED | OUTPATIENT
Start: 2021-12-28 | End: 2021-12-28 | Stop reason: HOSPADM

## 2021-12-28 RX ORDER — SODIUM CHLORIDE 9 MG/ML
INJECTION INTRAVENOUS AS NEEDED
Status: DISCONTINUED | OUTPATIENT
Start: 2021-12-28 | End: 2021-12-28 | Stop reason: HOSPADM

## 2021-12-28 RX ORDER — METHYLPREDNISOLONE ACETATE 80 MG/ML
INJECTION, SUSPENSION INTRA-ARTICULAR; INTRALESIONAL; INTRAMUSCULAR; SOFT TISSUE AS NEEDED
Status: DISCONTINUED | OUTPATIENT
Start: 2021-12-28 | End: 2021-12-28 | Stop reason: HOSPADM

## 2021-12-28 RX ORDER — ALPRAZOLAM 0.5 MG/1
0.5 TABLET ORAL ONCE
Status: COMPLETED | OUTPATIENT
Start: 2021-12-28 | End: 2021-12-28

## 2021-12-28 RX ADMIN — ALPRAZOLAM 0.5 MG: 0.5 TABLET ORAL at 10:13

## 2021-12-28 NOTE — OP NOTE
PERATIVE REPORT  PATIENT NAME: Christiano Roles    :  1970  MRN: [id number]  Pt Location:  GI ROOM 01    SURGERY DATE: 2021    Surgeon(s) and Role:      Jaden Martin MD - Primary    Preop Diagnosis:  Lumbar radiculopathy [M54 16]    Post-Op Diagnosis Codes:     * Lumbar radiculopathy [M54 16]    Procedure(s) (LRB):  BLOCK / INJECTION EPIDURAL STEROID LUMBAR L4-L5 (N/A)    Specimen(s):  * No specimens in log *    Estimated Blood Loss:   Minimal    Drains:  * No LDAs found *    Anesthesia Type:   Local    Operative Indications:  Lumbar radiculopathy [M54 16]    Operative Findings:  L4-L5 epidurogram    Complications:   None    Procedure and Technique:  Please see detailed procedure note     I was present for the entire procedure    Patient Disposition:  PACU     SIGNATURE: Pat Antonio MD  DATE: 2021  TIME: 10:40 AM

## 2021-12-28 NOTE — DISCHARGE INSTRUCTIONS
PLEASE SCHEDULE 2 WEEK FOLLOW UP BY CALLING THE [facility] AT [place]: [phone]      Epidural Steroid Injection   AMBULATORY CARE:   What you need to know about an epidural steroid injection (TAWNYA):  An TAWNYA is a procedure to inject steroid medicine into the epidural space  The epidural space is between your spinal cord and vertebrae  Steroids reduce inflammation and fluid buildup in your spine that may be causing pain  You may be given pain medicine along with the steroids  How to prepare for an TAWNYA:  Your healthcare provider will talk to you about how to prepare for your procedure  He or she will tell you what medicines to take or not take on the day of your procedure  You may need to stop taking blood thinners or other medicines several days before your procedure  You may need to adjust any diabetes medicine you take on the day of your procedure  Steroid medicine can increase your blood sugar level  Arrange for someone to drive you home when you are discharged  What will happen during an TAWNYA:   · You will be given medicine to numb the procedure area  You will be awake for the procedure, but you will not feel pain  You may also be given medicine to help you relax  Contrast liquid will be used to help your healthcare provider see the area better  Tell the healthcare provider if you have ever had an allergic reaction to contrast liquid  · Your healthcare provider may place the needle into your neck area, middle of your back, or tailbone area  He may inject the medicine next to the nerves that are causing your pain  He may instead inject the medicine into a larger area of the epidural space  This helps the medicine spread to more nerves  Your healthcare provider will use a fluoroscope to help guide the needle to the right place  A fluoroscope is a type of x-ray   After the procedure, a bandage will be placed over the injection site to prevent infection  What will happen after an TAWNYA:  You will have a bandage over the injection site to prevent infection  Your healthcare provider will tell you when you can bathe and any activity guidelines  You will be able to go home  Risks of an TAWNYA:  You may have temporary or permanent nerve damage or paralysis  You may have bleeding or develop a serious infection, such as meningitis (swelling of the brain coverings)  An abscess may also develop  An abscess is a pus-filled area under the skin  You may need surgery to fix the abscess  You may have a seizure, anxiety, or trouble sleeping  If you are a man, you may have temporary erectile dysfunction (not able to have an erection)  Call your local emergency number (911 in the [address] [facility]) if:   · You have a seizure  · You have trouble moving your legs  Seek care immediately if:   · Blood soaks through your bandage  · You have a fever or chills, severe back pain, and the procedure area is sensitive to the touch  · You cannot control when you urinate or have a bowel movement  Call your doctor if:   · You have weakness or numbness in your legs  · Your wound is red, swollen, or draining pus  · You have nausea or are vomiting  · Your face or neck is red and you feel warm  · You have more pain than you had before the procedure  · You have swelling in your hands or feet  · You have questions or concerns about your condition or care  Care for your wound as directed: You may remove the bandage before you go to bed the day of your procedure  You may take a shower, but do not take a bath for at least 24 hours  Self-care:   · Do not drive,  use machines, or do strenuous activity for 24 hours after your procedure or as directed  · Continue other treatments  as directed  Steroid injections alone will not control your pain  The injections are meant to be used with other treatments, such as physical therapy      Follow up with your doctor as directed:  Write down your questions so you remember to ask them during your visits  © Copyright [employer] 2021 Information is for End User's use only and may not be sold, redistributed or otherwise used for commercial purposes  All illustrations and images included in CareNotes® are the copyrighted property of A D A M , Inc  or [employer]  The above information is an  only  It is not intended as medical advice for individual conditions or treatments  Talk to your doctor, nurse or pharmacist before following any medical regimen to see if it is safe and effective for you

## 2021-12-28 NOTE — PROCEDURES
Pre-procedure Diagnosis:       Lumbar Radiculopathy  Post-procedure Diagnosis:     Lumbar Radiculopathy  Procedure Title(s):                  1  [L4-L5] interlaminar epidural steroid injection                                                  2  Intraoperative fluoroscopy  Attending Surgeon:                 Stephen Russo MD  Anesthesia:                             Local      Indications: The patient is a  46y o  year-old female  with a diagnosis of Lumbar Radiculopathy  The patient's history and physical exam were reviewed  The risks, benefits and alternatives to the procedure were discussed, and all questions were answered to the patient's satisfaction  The patient agreed to proceed, and written informed consent was obtained  Procedure in Detail: The patient was brought into the procedure room and placed in the prone position on the fluoroscopy table  The area of the lumbar spine was prepped with chlorhexidine gluconate solution times one and draped in a sterile manner  The [L4-L5] interspace was identified and marked under AP fluoroscopy  The skin and subcutaneous tissues in the area were anesthetized with 1% lidocaine  A 18-gauge Tuohy epidural needle was directed toward the interspace under fluoroscopic guidance until the ligamentum flavum was engaged  From this point, a loss of resistance technique with saline was used to identify entrance of the needle into the epidural space  Once an appropriate loss was obtained, negative aspiration was confirmed, and 2 ml Omnipaque 240 contrast solution was injected  An appropriate epidurogram was noted  Then, after negative aspiration, a solution consisting of 1-mL depo-medrol (80mg/mL) and 3-mL preservative-free saline was easily injected  The needle was removed with a 1% lidocaine flush  The patient's back was cleaned and a bandage was placed over the site of needle insertion       Disposition: The patient tolerated the procedure well, and there were no apparent complications  The patient was taken to the recovery area where written discharge instructions for the procedure were given        Estimated Blood Loss: None  Specimens Obtained: N/A

## 2021-12-28 NOTE — INTERVAL H&P NOTE
H&P reviewed  After examining the patient I find no changes in the patients condition since the H&P had been written      Vitals:    12/28/21 1010   BP: 150/86   Pulse: 84   Resp: 18   Temp: 98 7 °F (37 1 °C)   SpO2: 98%

## 2021-12-30 ENCOUNTER — APPOINTMENT (OUTPATIENT)
Dept: PHYSICAL THERAPY | Facility: CLINIC | Age: 51
End: 2021-12-30
Payer: COMMERCIAL

## 2022-01-06 ENCOUNTER — TELEPHONE (OUTPATIENT)
Dept: PAIN MEDICINE | Facility: CLINIC | Age: 52
End: 2022-01-06

## 2022-01-19 ENCOUNTER — OFFICE VISIT (OUTPATIENT)
Dept: PAIN MEDICINE | Facility: CLINIC | Age: 52
End: 2022-01-19
Payer: COMMERCIAL

## 2022-01-19 VITALS
RESPIRATION RATE: 20 BRPM | TEMPERATURE: 97.8 F | BODY MASS INDEX: 32.58 KG/M2 | HEIGHT: 67 IN | HEART RATE: 80 BPM | SYSTOLIC BLOOD PRESSURE: 142 MMHG | WEIGHT: 207.6 LBS | DIASTOLIC BLOOD PRESSURE: 78 MMHG

## 2022-01-19 DIAGNOSIS — M47.816 LUMBAR SPONDYLOSIS: ICD-10-CM

## 2022-01-19 DIAGNOSIS — M47.816 LUMBAR FACET ARTHROPATHY: ICD-10-CM

## 2022-01-19 DIAGNOSIS — M54.16 LUMBAR RADICULOPATHY, ACUTE: ICD-10-CM

## 2022-01-19 DIAGNOSIS — K21.9 GASTROESOPHAGEAL REFLUX DISEASE WITHOUT ESOPHAGITIS: Primary | ICD-10-CM

## 2022-01-19 DIAGNOSIS — M54.16 LUMBAR RADICULOPATHY: ICD-10-CM

## 2022-01-19 PROCEDURE — 3008F BODY MASS INDEX DOCD: CPT | Performed by: ANESTHESIOLOGY

## 2022-01-19 PROCEDURE — 99214 OFFICE O/P EST MOD 30 MIN: CPT | Performed by: ANESTHESIOLOGY

## 2022-01-19 PROCEDURE — 1036F TOBACCO NON-USER: CPT | Performed by: ANESTHESIOLOGY

## 2022-01-19 RX ORDER — PANTOPRAZOLE SODIUM 40 MG/1
40 TABLET, DELAYED RELEASE ORAL DAILY
Qty: 90 TABLET | Refills: 0 | Status: SHIPPED | OUTPATIENT
Start: 2022-01-19 | End: 2022-04-15

## 2022-01-19 RX ORDER — BUPIVACAINE HCL/PF 2.5 MG/ML
10 VIAL (ML) INJECTION ONCE
Status: CANCELLED | OUTPATIENT
Start: 2022-01-19 | End: 2022-01-19

## 2022-01-19 RX ORDER — GABAPENTIN 300 MG/1
300 CAPSULE ORAL 3 TIMES DAILY
Qty: 270 CAPSULE | Refills: 0 | Status: SHIPPED | OUTPATIENT
Start: 2022-01-19 | End: 2022-02-28 | Stop reason: SDUPTHER

## 2022-01-19 RX ORDER — METHYLPREDNISOLONE ACETATE 80 MG/ML
80 INJECTION, SUSPENSION INTRA-ARTICULAR; INTRALESIONAL; INTRAMUSCULAR; PARENTERAL; SOFT TISSUE ONCE
Status: CANCELLED | OUTPATIENT
Start: 2022-01-19 | End: 2022-01-19

## 2022-01-19 RX ORDER — METOPROLOL SUCCINATE 50 MG/1
50 TABLET, EXTENDED RELEASE ORAL DAILY
COMMUNITY
End: 2022-03-18 | Stop reason: SDUPTHER

## 2022-01-19 RX ORDER — IBUPROFEN 600 MG/1
600 TABLET ORAL EVERY 12 HOURS PRN
Qty: 180 TABLET | Refills: 0 | Status: SHIPPED | OUTPATIENT
Start: 2022-01-19 | End: 2022-01-25 | Stop reason: SDUPTHER

## 2022-01-19 RX ORDER — LIDOCAINE HYDROCHLORIDE 10 MG/ML
10 INJECTION, SOLUTION EPIDURAL; INFILTRATION; INTRACAUDAL; PERINEURAL ONCE
Status: CANCELLED | OUTPATIENT
Start: 2022-01-19 | End: 2022-01-19

## 2022-01-19 NOTE — PATIENT INSTRUCTIONS
Lumbar Facet Block   WHAT YOU NEED TO KNOW:   A lumbar facet block is a procedure used to decrease inflammation in your lower spine  Medicines are injected at facet joints in your lower back  Facet joints are found at the back of each vertebrae  HOW TO PREPARE:   The week before your procedure:   · Your healthcare provider will talk to you about how to prepare for your procedure  Arrange to have someone drive you home after the procedure  · Tell your provider about all the medicines you currently take  He or she will tell you if you need to stop any medicine before the procedure, and when to stop  He or she will tell you what medicines to take or not take on the day of your procedure  · You may need blood or urine tests before your procedure  You may also need x-rays, a CT scan, or an MRI  Tell your healthcare provider if you have ever had an allergic reaction to contrast liquid  Do not enter the MRI room with anything metal  Metal can cause serious damage  Tell the provider if you have any metal in or on your body  The night before your procedure: You may be told not to eat or drink anything after midnight  The day of your procedure:   · Take only the medicines your healthcare provider told you to take  · You or a close family member will be asked to sign a legal document called a consent form  It gives healthcare providers permission to do the procedure or surgery  It also explains the problems that may happen, and your choices  Make sure all your questions are answered before you sign this form  · Healthcare providers may insert an intravenous tube (IV) into your vein  A vein in the arm is usually chosen  Through the IV tube, you may be given liquids and medicine  · An anesthesiologist will talk to you before your surgery  You may need medicine to keep you asleep or numb an area of your body during surgery   Tell healthcare providers if you or anyone in your family has had a problem with anesthesia in the past     WHAT WILL HAPPEN:   What will happen:   · You will lie on your stomach, with your body slightly turned to the side  A pillow may be placed under your abdomen, or you may be asked to bend one or both knees  · A needle will be inserted into the facet joint in your lower back  Your surgeon may use contrast liquid with an x-ray or CT to help guide the needle  He or she will inject medicines, such as steroids, to decrease inflammation  After your procedure: You will be taken to a room to rest until you are fully awake  You will be monitored closely for any problems  Do not get out of bed until your healthcare provider says it is okay  Your provider may have you move the area to see if you still have pain  You may then be able to go home  CONTACT YOUR HEALTHCARE PROVIDER IF:   · You have a fever, a cold, or the flu  · You have questions or concerns about your procedure  RISKS:   You may bleed more than expected or get an infection  Nerves, blood vessels, or muscles may be damaged  You may have numbness in other areas  You may still have lower back or leg pain  CARE AGREEMENT:   You have the right to help plan your care  Learn about your health condition and how it may be treated  Discuss treatment options with your healthcare providers to decide what care you want to receive  You always have the right to refuse treatment  © Copyright [employer] 2021 Information is for End User's use only and may not be sold, redistributed or otherwise used for commercial purposes  All illustrations and images included in CareNotes® are the copyrighted property of A D A M , Inc  or [facility] Laurie Tirado   The above information is an  only  It is not intended as medical advice for individual conditions or treatments  Talk to your doctor, nurse or pharmacist before following any medical regimen to see if it is safe and effective for you    Lumbar Radiofrequency Ablation   WHAT YOU NEED TO KNOW:   What do I need to know about lumbar radiofrequency ablation? Lumbar radiofrequency ablation (RFA) is a procedure used to treat facet joint pain in your lower back  Facet joints are found at the back of each vertebra  A needle electrode is used to send electrical currents to the nerves in your facet joint  The electrical currents create heat that damages the nerve so it cannot send pain signals  How do I prepare for lumbar RFA? Your healthcare provider will talk to you about how to prepare for this procedure  He may tell you not to eat or drink anything after midnight on the day of your procedure  He will tell you what medicines to take or not take on the day of your procedure  What will happen during lumbar RFA? · You will lie on your stomach  You will be given local anesthesia to numb the area of your back where the needle electrode will be inserted  You may be given a sedative to help keep you relaxed  You may still feel pressure or pushing during the procedure, but you should not feel any pain  Your healthcare provider will use fluoroscopy (a type of x-ray) to guide the needle electrode to the nerves near your facet joint  · Your healthcare provider may touch the affected nerve to make sure the needle electrode is in the right place  You will feel tingling or pressure when he does this  He will then apply local anesthesia to the nerve to numb it  This will prevent you from feeling pain when he applies heat to the nerve  Your healthcare provider will then apply heat to the nerve using the needle electrode  He may need to apply heat to more than one nerve  He will remove the needle electrode and apply a bandage over the area  What are the risks of lumbar RFA? You may have pain, numbness, tingling, or burning in the area where the lumbar RFA was done  These normally go away within 6 weeks  The needle electrode may injure your spinal nerves   This may cause permanent leg weakness or nerve pain   CARE AGREEMENT:   You have the right to help plan your care  Learn about your health condition and how it may be treated  Discuss treatment options with your healthcare providers to decide what care you want to receive  You always have the right to refuse treatment  The above information is an  only  It is not intended as medical advice for individual conditions or treatments  Talk to your doctor, nurse or pharmacist before following any medical regimen to see if it is safe and effective for you  © Copyright [employer] 2021 Information is for End User's use only and may not be sold, redistributed or otherwise used for commercial purposes   All illustrations and images included in CareNotes® are the copyrighted property of [employer]  or [address] [place] [address]

## 2022-01-19 NOTE — PROGRESS NOTES
Assessment  1  Gastroesophageal reflux disease without esophagitis  -     pantoprazole (PROTONIX) 40 mg tablet; Take 1 tablet (40 mg total) by mouth daily    2  Lumbar radiculopathy  -     gabapentin (NEURONTIN) 300 mg capsule; Take 1 capsule (300 mg total) by mouth 3 (three) times a day    3  Lumbar radiculopathy, acute  -     ibuprofen (MOTRIN) 600 mg tablet; Take 1 tablet (600 mg total) by mouth every 12 (twelve) hours as needed for moderate pain Take 1 ibuprofen with 2 extra-strength 500 mg Tylenol 3 times daily    4  Lumbar facet arthropathy  -     Case request operating room: BLOCK MEDIAL BRANCH L3, L4, L5 #1; Standing  -     Case request operating room: BLOCK MEDIAL BRANCH L3, L4, L5 #1    Greater than 90% relief of pain with improved ability to participate with IADLs after ILESI at L4-L5  Now describes axial low back pain described primarily arthritic features  Positive facet loading maneuvers consistent with prominent lumbar facet arthropathy noted on MRI lumbar spine  Risks benefits and alternatives to lumbar medial branch blocks and subsequent radiofrequency ablation of successful thoroughly discussed with patient  Handouts provided and questions answered to satisfaction  Previously reported the following symptomatology:     MRI findings show mild noncompressive lumbar degenerative Disc disease with lumbar facet arthropathy noted at lower lumbar levels  Mild weakness with right hip flexion, knee extension and right ankle dorsiflexion  Additionally has pain with bilateral facet loading maneuvers and lumbar spine as well as with axial rotation  Reports symptomatology of lumbar radiculopathy in right L4 and L5 dermatomal distributions accompanied by weakness numbness and paresthesias at times  Physical therapy has improved her symptoms to a moderate extent  Reasonable to proceed with right L4 and L5 TFESI to target radicular pain at this time as part of multimodal pain therapy plan    If symptomatology lumbar facet arthropathy are more prevalent, we may proceed with medial branch blocks  All interventions including epidural steroid injections, medial branch blocks and subsequent radiofrequency ablation if successful were thoroughly discussed with patient  Handouts provided and questions answered to patient's satisfaction  Plan  -bilateral L3, L4, L5 medial blocks #1; f/u 2 weeks post procedure  -gabapentin increased to 300 mg t i d  Ordered for patient; counseled regarding sedative effects of taking this medication and provided up titration calendar  Counseled not to take medication while driving or operating heavy machinery/using stairs  -  Ibuprofen 600 mg t i d  previously Prescribed  Patient educated regarding bleeding risk of taking this medication not taking any other nonsteroidal anti-inflammatory medications while taking this medication; counseled thoroughly regarding potential risk of Cardiovascular injury, Kidney injury, Gastrointestinal ulceration/bleeding  Patient voiced understanding; gi ppx additionally written  -physical therapy for right-sided lumbar radiculopathy; Core exercises additionally provided for physician directed home PT that the patient plans to participate with for 1 hour, twice a week for the next 6 weeks  There are risks associated with opioid medications, including dependence, addiction and tolerance  The patient understands and agrees to use these medications only as prescribed  Potential side effects of the medications include, but are not limited to, constipation, drowsiness, addiction, impaired judgment and risk of fatal overdose if not taken as prescribed  The patient was warned against driving while taking sedation medications  Sharing medications is a felony  At this point in time, the patient is showing no signs of addiction, abuse, diversion or suicidal ideation      [place] Prescription Drug Monitoring Program report was reviewed and was appropriate     Complete risks and benefits including bleeding, infection, tissue reaction, nerve injury and allergic reaction were discussed  The approach was demonstrated using models and literature was provided  Verbal and written consent was obtained  My impressions and treatment recommendations were discussed in detail with the patient who verbalized understanding and had no further questions  Discharge instructions were provided  I personally saw and examined the patient and I agree with the above discussed plan of care  New Medications Ordered This Visit   Medications    metoprolol succinate (TOPROL-XL) 50 mg 24 hr tablet     Sig: Take 50 mg by mouth daily    gabapentin (NEURONTIN) 300 mg capsule     Sig: Take 1 capsule (300 mg total) by mouth 3 (three) times a day     Dispense:  270 capsule     Refill:  0    ibuprofen (MOTRIN) 600 mg tablet     Sig: Take 1 tablet (600 mg total) by mouth every 12 (twelve) hours as needed for moderate pain Take 1 ibuprofen with 2 extra-strength 500 mg Tylenol 3 times daily     Dispense:  180 tablet     Refill:  0    pantoprazole (PROTONIX) 40 mg tablet     Sig: Take 1 tablet (40 mg total) by mouth daily     Dispense:  90 tablet     Refill:  0       History of Present Illness    Greater than 90% relief of pain with improved ability to participate with IADLs after ILESI at L4-L5  Now describes axial low back pain described primarily arthritic features  Positive facet loading maneuvers consistent with prominent lumbar facet arthropathy noted on MRI lumbar spine  Risks benefits and alternatives to lumbar medial branch blocks and subsequent radiofrequency ablation of successful thoroughly discussed with patient  Handouts provided and questions answered to satisfaction   Previously reported the following symptomatology:     Juliana Montano is a 46 y o  female  With past medical history of hypercholesterolemia presenting with 12 week history of lumbar radicular pain in right L4 and L5 dermatomal distribution accompanied by weakness numbness and paresthesias  The patient rates the pain at a 8/10 accompanied by electric shock-like shooting features and crampy burning pain in the aforementioned dermatomal distributions  She additionally notes symptomatology of aching, nagging, indolent, stabbing, throbbing features of the pain in her back, symptomatic of arthritic like low back pain  The pain is worse in the mornings as well as the end of the day; exertion such as walking for long periods of time seems to exacerbate the pain  The patient can hardly walk more than a few blocks without having debilitating pain  She tries to maintain an active lifestyle and finds that the current degree of pain seems to compromises her efforts  The pain significantly impacts independent activities of daily living and contributes to significant disability  She has attempted a 1 month course of physical therapy with moderate relief of the pain  She has taken ibuprofen as well as flexeril with limited relief of the pain as well  She has never tried epidural steroid injections in the past  She denies any bowel or bladder dysfunction/incontinence    I have personally reviewed and/or updated the patient's past medical history, past surgical history, family history, social history, current medications, allergies, and vital signs today  Review of Systems   Constitutional: Positive for activity change  HENT: Negative  Eyes: Negative  Respiratory: Negative  Cardiovascular: Negative  Gastrointestinal: Negative  Endocrine: Negative  Genitourinary: Negative  Musculoskeletal: Positive for arthralgias, back pain, gait problem and myalgias  Skin: Negative  Allergic/Immunologic: Negative  Neurological: Positive for weakness and numbness  Hematological: Negative  Psychiatric/Behavioral: Negative  All other systems reviewed and are negative        Patient Active Problem List Diagnosis    Mixed hyperlipidemia    Hypertension    Anxiety    Vitamin D deficiency       Past Medical History:   Diagnosis Date    Hyperlipidemia     Hypertension        Past Surgical History:   Procedure Laterality Date    BREAST BIOPSY Left 2009    core bx; benign     CHOLECYSTECTOMY  1995    EPIDURAL BLOCK INJECTION Right 12/2/2021    Procedure: L4 and L5 TRANSFORAMINAL EPIDURAL STEROID INJECTION;  Surgeon: Monique Mayer MD;  Location: [facility];  Service: Pain Management     EPIDURAL BLOCK INJECTION N/A 12/28/2021    Procedure: BLOCK / INJECTION EPIDURAL STEROID LUMBAR L4-L5;  Surgeon: Monique Mayer MD;  Location: [facility];  Service: Pain Management     HYSTERECTOMY      age 26    TOOTH EXTRACTION         Family History   Problem Relation Age of Onset    Alzheimer's disease Mother     Lung cancer Mother     Heart disease Father         cardiac disorder    Prostate cancer Father     No Known Problems Sister     No Known Problems Daughter     Tongue cancer Maternal Grandmother     Lung cancer Maternal Grandfather     No Known Problems Paternal Grandmother     Lung cancer Paternal Grandfather     Emphysema Maternal Aunt     No Known Problems Sister     No Known Problems Sister     Breast cancer Cousin 39       Social History     Occupational History    Not on file   Tobacco Use    Smoking status: Never Smoker    Smokeless tobacco: Never Used   Vaping Use    Vaping Use: Never used   Substance and Sexual Activity    Alcohol use: Yes     Comment: social drinker    Drug use: No    Sexual activity: Yes     Partners: Male     Birth control/protection: None     Comment: hysterectomy       Current Outpatient Medications on File Prior to Visit   Medication Sig    acetaminophen (Tylenol) 325 mg tablet Take 650 mg by mouth every 6 (six) hours as needed for mild pain    Calcium-Vitamin D-Vitamin K (VIACTIV PO) Take by mouth daily    cetirizine (ZyrTEC) 10 MG chewable tablet Chew 10 mg daily    Cholecalciferol (RA VITAMIN D-3) 2000 units CAPS Take by mouth daily    desvenlafaxine succinate (PRISTIQ) 50 mg 24 hr tablet TAKE 1 TABLET DAILY    metoprolol succinate (TOPROL-XL) 50 mg 24 hr tablet Take 50 mg by mouth daily    Multiple Vitamin (MULTIVITAMIN) tablet Take 1 tablet by mouth daily    rosuvastatin (CRESTOR) 10 MG tablet Take 1 tablet (10 mg total) by mouth daily    [DISCONTINUED] gabapentin (NEURONTIN) 100 mg capsule Take 1 capsule (100 mg total) by mouth 3 (three) times a day    [DISCONTINUED] gabapentin (NEURONTIN) 300 mg capsule Take 1 capsule (300 mg total) by mouth 3 (three) times a day    [DISCONTINUED] ibuprofen (MOTRIN) 600 mg tablet Take 1 tablet (600 mg total) by mouth every 8 (eight) hours as needed for mild pain Take 1 ibuprofen with 2 extra-strength 500 mg Tylenol 3 times daily    [DISCONTINUED] metoprolol succinate (TOPROL-XL) 50 mg 24 hr tablet TAKE 1 TABLET DAILY (Patient taking differently: 100 mg )     No current facility-administered medications on file prior to visit  Allergies   Allergen Reactions    Codeine Other (See Comments)     "throat swells"         Physical Exam    /78   Pulse 80   Temp 97 8 °F (36 6 °C)   Resp 20   Ht 5' 7" (1 702 m)   Wt 94 2 kg (207 lb 9 6 oz)   BMI 32 51 kg/m²     Constitutional: normal, well developed, well nourished, alert, in no distress and non-toxic and no overt pain behavior  and overweight  Eyes: anicteric  HEENT: grossly intact  Neck: supple, symmetric, trachea midline and no masses   Pulmonary:even and unlabored  Cardiovascular:No edema or pitting edema present  Skin:Normal without rashes or lesions and well hydrated  Psychiatric:Mood and affect appropriate  Neurologic:Cranial Nerves II-XII grossly intact Sensation grossly intact; no clonus negative bethea's  Reflexes 2+ and brisk  SLR weakly positive right sided  Musculoskeletal:normal gait   Mild weakness with right sided hip flexion, knee extension and right ehl extension  Otherwise 5/5 strength in all other active range of motion movements b/l  Normal heel toe and tip toe walking  Significant pain with lumbar facet loading bilaterally and with lateral spine rotation  ttp over lumbar paraspinal muscles  Negative deo's test, negative gaenslen's negative SIJ loading bilaterally  Imaging    MRI LUMBAR SPINE WITHOUT CONTRAST     INDICATION: [id number]: Radiculopathy, lumbar region  8 weeks of right-sided lumbar radiculopathy  Acute intractable low back pain  Status post 6 weeks of physical therapy  Leg weakness  Low back pain      COMPARISON:  8/17/2021     TECHNIQUE:  Sagittal T1, sagittal T2, sagittal inversion recovery, axial T1 and axial T2, coronal T2     IMAGE QUALITY:  Diagnostic     FINDINGS:     VERTEBRAL BODIES:  There are 5 lumbar type vertebral bodies  Normal alignment of the lumbar spine  No spondylolysis or spondylolisthesis  No scoliosis  No compression fracture  Scattered degenerative endplate changes  No focally suspicious marrow   lesions  No bone marrow edema or compression abnormality  Type II Modic endplate changes [id number]  Hemangioma T12 vertebra  Small hemangioma centrally in the L4 vertebra      SACRUM:  Normal signal within the sacrum  No evidence of insufficiency or stress fracture      DISTAL CORD AND CONUS:  Normal size and signal within the distal cord and conus  The conus medullaris terminates at the superior endplate of L2      PARASPINAL SOFT TISSUES:  Paraspinal soft tissues are unremarkable      LOWER THORACIC DISC SPACES:  Mild noncompressive lower thoracic degenerative change      LUMBAR DISC SPACES:     L1-L2:  Normal      L2-L3:  Normal      L3-L4:  There is a left neural foraminal disc herniation, protrusion type  Mild left neural foraminal narrowing  Central canal and right neural foramen patent      L4-L5:  There is a diffuse disk bulge  No significant central canal or neural foraminal narrowing  Bilateral facet hypertrophy noted      L5-S1:  There is a diffuse disk bulge  No significant central canal or neural foraminal narrowing  Bilateral facet hypertrophy noted      IMPRESSION:     Mild noncompressive lumbar degenerative change

## 2022-01-19 NOTE — H&P (VIEW-ONLY)
Assessment  1  Gastroesophageal reflux disease without esophagitis  -     pantoprazole (PROTONIX) 40 mg tablet; Take 1 tablet (40 mg total) by mouth daily    2  Lumbar radiculopathy  -     gabapentin (NEURONTIN) 300 mg capsule; Take 1 capsule (300 mg total) by mouth 3 (three) times a day    3  Lumbar radiculopathy, acute  -     ibuprofen (MOTRIN) 600 mg tablet; Take 1 tablet (600 mg total) by mouth every 12 (twelve) hours as needed for moderate pain Take 1 ibuprofen with 2 extra-strength 500 mg Tylenol 3 times daily    4  Lumbar facet arthropathy  -     Case request operating room: BLOCK MEDIAL BRANCH L3, L4, L5 #1; Standing  -     Case request operating room: BLOCK MEDIAL BRANCH L3, L4, L5 #1    Greater than 90% relief of pain with improved ability to participate with IADLs after ILESI at L4-L5  Now describes axial low back pain described primarily arthritic features  Positive facet loading maneuvers consistent with prominent lumbar facet arthropathy noted on MRI lumbar spine  Risks benefits and alternatives to lumbar medial branch blocks and subsequent radiofrequency ablation of successful thoroughly discussed with patient  Handouts provided and questions answered to satisfaction  Previously reported the following symptomatology:     MRI findings show mild noncompressive lumbar degenerative Disc disease with lumbar facet arthropathy noted at lower lumbar levels  Mild weakness with right hip flexion, knee extension and right ankle dorsiflexion  Additionally has pain with bilateral facet loading maneuvers and lumbar spine as well as with axial rotation  Reports symptomatology of lumbar radiculopathy in right L4 and L5 dermatomal distributions accompanied by weakness numbness and paresthesias at times  Physical therapy has improved her symptoms to a moderate extent  Reasonable to proceed with right L4 and L5 TFESI to target radicular pain at this time as part of multimodal pain therapy plan    If symptomatology lumbar facet arthropathy are more prevalent, we may proceed with medial branch blocks  All interventions including epidural steroid injections, medial branch blocks and subsequent radiofrequency ablation if successful were thoroughly discussed with patient  Handouts provided and questions answered to patient's satisfaction  Plan  -bilateral L3, L4, L5 medial blocks #1; f/u 2 weeks post procedure  -gabapentin increased to 300 mg t i d  Ordered for patient; counseled regarding sedative effects of taking this medication and provided up titration calendar  Counseled not to take medication while driving or operating heavy machinery/using stairs  -  Ibuprofen 600 mg t i d  previously Prescribed  Patient educated regarding bleeding risk of taking this medication not taking any other nonsteroidal anti-inflammatory medications while taking this medication; counseled thoroughly regarding potential risk of Cardiovascular injury, Kidney injury, Gastrointestinal ulceration/bleeding  Patient voiced understanding; gi ppx additionally written  -physical therapy for right-sided lumbar radiculopathy; Core exercises additionally provided for physician directed home PT that the patient plans to participate with for 1 hour, twice a week for the next 6 weeks  There are risks associated with opioid medications, including dependence, addiction and tolerance  The patient understands and agrees to use these medications only as prescribed  Potential side effects of the medications include, but are not limited to, constipation, drowsiness, addiction, impaired judgment and risk of fatal overdose if not taken as prescribed  The patient was warned against driving while taking sedation medications  Sharing medications is a felony  At this point in time, the patient is showing no signs of addiction, abuse, diversion or suicidal ideation      [place] Prescription Drug Monitoring Program report was reviewed and was appropriate     Complete risks and benefits including bleeding, infection, tissue reaction, nerve injury and allergic reaction were discussed  The approach was demonstrated using models and literature was provided  Verbal and written consent was obtained  My impressions and treatment recommendations were discussed in detail with the patient who verbalized understanding and had no further questions  Discharge instructions were provided  I personally saw and examined the patient and I agree with the above discussed plan of care  New Medications Ordered This Visit   Medications    metoprolol succinate (TOPROL-XL) 50 mg 24 hr tablet     Sig: Take 50 mg by mouth daily    gabapentin (NEURONTIN) 300 mg capsule     Sig: Take 1 capsule (300 mg total) by mouth 3 (three) times a day     Dispense:  270 capsule     Refill:  0    ibuprofen (MOTRIN) 600 mg tablet     Sig: Take 1 tablet (600 mg total) by mouth every 12 (twelve) hours as needed for moderate pain Take 1 ibuprofen with 2 extra-strength 500 mg Tylenol 3 times daily     Dispense:  180 tablet     Refill:  0    pantoprazole (PROTONIX) 40 mg tablet     Sig: Take 1 tablet (40 mg total) by mouth daily     Dispense:  90 tablet     Refill:  0       History of Present Illness    Greater than 90% relief of pain with improved ability to participate with IADLs after ILESI at L4-L5  Now describes axial low back pain described primarily arthritic features  Positive facet loading maneuvers consistent with prominent lumbar facet arthropathy noted on MRI lumbar spine  Risks benefits and alternatives to lumbar medial branch blocks and subsequent radiofrequency ablation of successful thoroughly discussed with patient  Handouts provided and questions answered to satisfaction   Previously reported the following symptomatology:     Francesco Carrasquillo is a 46 y o  female  With past medical history of hypercholesterolemia presenting with 12 week history of lumbar radicular pain in right L4 and L5 dermatomal distribution accompanied by weakness numbness and paresthesias  The patient rates the pain at a 8/10 accompanied by electric shock-like shooting features and crampy burning pain in the aforementioned dermatomal distributions  She additionally notes symptomatology of aching, nagging, indolent, stabbing, throbbing features of the pain in her back, symptomatic of arthritic like low back pain  The pain is worse in the mornings as well as the end of the day; exertion such as walking for long periods of time seems to exacerbate the pain  The patient can hardly walk more than a few blocks without having debilitating pain  She tries to maintain an active lifestyle and finds that the current degree of pain seems to compromises her efforts  The pain significantly impacts independent activities of daily living and contributes to significant disability  She has attempted a 1 month course of physical therapy with moderate relief of the pain  She has taken ibuprofen as well as flexeril with limited relief of the pain as well  She has never tried epidural steroid injections in the past  She denies any bowel or bladder dysfunction/incontinence    I have personally reviewed and/or updated the patient's past medical history, past surgical history, family history, social history, current medications, allergies, and vital signs today  Review of Systems   Constitutional: Positive for activity change  HENT: Negative  Eyes: Negative  Respiratory: Negative  Cardiovascular: Negative  Gastrointestinal: Negative  Endocrine: Negative  Genitourinary: Negative  Musculoskeletal: Positive for arthralgias, back pain, gait problem and myalgias  Skin: Negative  Allergic/Immunologic: Negative  Neurological: Positive for weakness and numbness  Hematological: Negative  Psychiatric/Behavioral: Negative  All other systems reviewed and are negative        Patient Active Problem List Diagnosis    Mixed hyperlipidemia    Hypertension    Anxiety    Vitamin D deficiency       Past Medical History:   Diagnosis Date    Hyperlipidemia     Hypertension        Past Surgical History:   Procedure Laterality Date    BREAST BIOPSY Left 2009    core bx; benign     CHOLECYSTECTOMY  1995    EPIDURAL BLOCK INJECTION Right 12/2/2021    Procedure: L4 and L5 TRANSFORAMINAL EPIDURAL STEROID INJECTION;  Surgeon: Manoj Michelle MD;  Location: [facility];  Service: Pain Management     EPIDURAL BLOCK INJECTION N/A 12/28/2021    Procedure: BLOCK / INJECTION EPIDURAL STEROID LUMBAR L4-L5;  Surgeon: Manoj Michelle MD;  Location: [facility];  Service: Pain Management     HYSTERECTOMY      age 26    TOOTH EXTRACTION         Family History   Problem Relation Age of Onset    Alzheimer's disease Mother     Lung cancer Mother     Heart disease Father         cardiac disorder    Prostate cancer Father     No Known Problems Sister     No Known Problems Daughter     Tongue cancer Maternal Grandmother     Lung cancer Maternal Grandfather     No Known Problems Paternal Grandmother     Lung cancer Paternal Grandfather     Emphysema Maternal Aunt     No Known Problems Sister     No Known Problems Sister     Breast cancer Cousin 39       Social History     Occupational History    Not on file   Tobacco Use    Smoking status: Never Smoker    Smokeless tobacco: Never Used   Vaping Use    Vaping Use: Never used   Substance and Sexual Activity    Alcohol use: Yes     Comment: social drinker    Drug use: No    Sexual activity: Yes     Partners: Male     Birth control/protection: None     Comment: hysterectomy       Current Outpatient Medications on File Prior to Visit   Medication Sig    acetaminophen (Tylenol) 325 mg tablet Take 650 mg by mouth every 6 (six) hours as needed for mild pain    Calcium-Vitamin D-Vitamin K (VIACTIV PO) Take by mouth daily    cetirizine (ZyrTEC) 10 MG chewable tablet Chew 10 mg daily    Cholecalciferol (RA VITAMIN D-3) 2000 units CAPS Take by mouth daily    desvenlafaxine succinate (PRISTIQ) 50 mg 24 hr tablet TAKE 1 TABLET DAILY    metoprolol succinate (TOPROL-XL) 50 mg 24 hr tablet Take 50 mg by mouth daily    Multiple Vitamin (MULTIVITAMIN) tablet Take 1 tablet by mouth daily    rosuvastatin (CRESTOR) 10 MG tablet Take 1 tablet (10 mg total) by mouth daily    [DISCONTINUED] gabapentin (NEURONTIN) 100 mg capsule Take 1 capsule (100 mg total) by mouth 3 (three) times a day    [DISCONTINUED] gabapentin (NEURONTIN) 300 mg capsule Take 1 capsule (300 mg total) by mouth 3 (three) times a day    [DISCONTINUED] ibuprofen (MOTRIN) 600 mg tablet Take 1 tablet (600 mg total) by mouth every 8 (eight) hours as needed for mild pain Take 1 ibuprofen with 2 extra-strength 500 mg Tylenol 3 times daily    [DISCONTINUED] metoprolol succinate (TOPROL-XL) 50 mg 24 hr tablet TAKE 1 TABLET DAILY (Patient taking differently: 100 mg )     No current facility-administered medications on file prior to visit  Allergies   Allergen Reactions    Codeine Other (See Comments)     "throat swells"         Physical Exam    /78   Pulse 80   Temp 97 8 °F (36 6 °C)   Resp 20   Ht 5' 7" (1 702 m)   Wt 94 2 kg (207 lb 9 6 oz)   BMI 32 51 kg/m²     Constitutional: normal, well developed, well nourished, alert, in no distress and non-toxic and no overt pain behavior  and overweight  Eyes: anicteric  HEENT: grossly intact  Neck: supple, symmetric, trachea midline and no masses   Pulmonary:even and unlabored  Cardiovascular:No edema or pitting edema present  Skin:Normal without rashes or lesions and well hydrated  Psychiatric:Mood and affect appropriate  Neurologic:Cranial Nerves II-XII grossly intact Sensation grossly intact; no clonus negative bethea's  Reflexes 2+ and brisk  SLR weakly positive right sided  Musculoskeletal:normal gait   Mild weakness with right sided hip flexion, knee extension and right ehl extension  Otherwise 5/5 strength in all other active range of motion movements b/l  Normal heel toe and tip toe walking  Significant pain with lumbar facet loading bilaterally and with lateral spine rotation  ttp over lumbar paraspinal muscles  Negative deo's test, negative gaenslen's negative SIJ loading bilaterally  Imaging    MRI LUMBAR SPINE WITHOUT CONTRAST     INDICATION: M54 16: Radiculopathy, lumbar region  8 weeks of right-sided lumbar radiculopathy  Acute intractable low back pain  Status post 6 weeks of physical therapy  Leg weakness  Low back pain      COMPARISON:  8/17/2021     TECHNIQUE:  Sagittal T1, sagittal T2, sagittal inversion recovery, axial T1 and axial T2, coronal T2     IMAGE QUALITY:  Diagnostic     FINDINGS:     VERTEBRAL BODIES:  There are 5 lumbar type vertebral bodies  Normal alignment of the lumbar spine  No spondylolysis or spondylolisthesis  No scoliosis  No compression fracture  Scattered degenerative endplate changes  No focally suspicious marrow   lesions  No bone marrow edema or compression abnormality  Type II Modic endplate changes T26-L0  Hemangioma T12 vertebra  Small hemangioma centrally in the L4 vertebra      SACRUM:  Normal signal within the sacrum  No evidence of insufficiency or stress fracture      DISTAL CORD AND CONUS:  Normal size and signal within the distal cord and conus  The conus medullaris terminates at the superior endplate of L2      PARASPINAL SOFT TISSUES:  Paraspinal soft tissues are unremarkable      LOWER THORACIC DISC SPACES:  Mild noncompressive lower thoracic degenerative change      LUMBAR DISC SPACES:     L1-L2:  Normal      L2-L3:  Normal      L3-L4:  There is a left neural foraminal disc herniation, protrusion type  Mild left neural foraminal narrowing  Central canal and right neural foramen patent      L4-L5:  There is a diffuse disk bulge  No significant central canal or neural foraminal narrowing  Bilateral facet hypertrophy noted      L5-S1:  There is a diffuse disk bulge  No significant central canal or neural foraminal narrowing  Bilateral facet hypertrophy noted      IMPRESSION:     Mild noncompressive lumbar degenerative change

## 2022-01-25 ENCOUNTER — TELEPHONE (OUTPATIENT)
Dept: PAIN MEDICINE | Facility: CLINIC | Age: 52
End: 2022-01-25

## 2022-01-25 DIAGNOSIS — M54.16 LUMBAR RADICULOPATHY, ACUTE: ICD-10-CM

## 2022-01-25 RX ORDER — IBUPROFEN 600 MG/1
600 TABLET ORAL EVERY 12 HOURS PRN
Qty: 180 TABLET | Refills: 0 | Status: SHIPPED | OUTPATIENT
Start: 2022-01-25

## 2022-01-25 NOTE — TELEPHONE ENCOUNTER
Caroline Marinelli from [employer] called with some questions regarding dosage of the pts ibuprofen (MOTRIN) 600 mg tablet     Call dropped while transferring to clinical

## 2022-01-25 NOTE — TELEPHONE ENCOUNTER
Received note from co-worker    sig written as below- they need more clarification on dosage instructions    Take 1 tablet (600 mg total) by mouth every 12 (twelve) hours as needed for moderate pain Take 1 ibuprofen with 2 extra-strength 500 mg Tylenol 3 times daily    Thanks

## 2022-01-28 DIAGNOSIS — E78.2 MIXED HYPERLIPIDEMIA: ICD-10-CM

## 2022-01-28 RX ORDER — ROSUVASTATIN CALCIUM 10 MG/1
TABLET, COATED ORAL
Qty: 90 TABLET | Refills: 3 | Status: SHIPPED | OUTPATIENT
Start: 2022-01-28

## 2022-02-15 ENCOUNTER — APPOINTMENT (OUTPATIENT)
Dept: RADIOLOGY | Facility: HOSPITAL | Age: 52
End: 2022-02-15
Payer: COMMERCIAL

## 2022-02-15 ENCOUNTER — HOSPITAL ENCOUNTER (OUTPATIENT)
Facility: HOSPITAL | Age: 52
Setting detail: OUTPATIENT SURGERY
Discharge: HOME | End: 2022-02-15
Attending: ANESTHESIOLOGY | Admitting: ANESTHESIOLOGY
Payer: COMMERCIAL

## 2022-02-15 VITALS
TEMPERATURE: 97.8 F | BODY MASS INDEX: 32.49 KG/M2 | DIASTOLIC BLOOD PRESSURE: 85 MMHG | HEART RATE: 65 BPM | RESPIRATION RATE: 18 BRPM | SYSTOLIC BLOOD PRESSURE: 165 MMHG | OXYGEN SATURATION: 95 % | HEIGHT: 67 IN | WEIGHT: 207 LBS

## 2022-02-15 PROCEDURE — 64493 INJ PARAVERT F JNT L/S 1 LEV: CPT | Performed by: ANESTHESIOLOGY

## 2022-02-15 PROCEDURE — 64494 INJ PARAVERT F JNT L/S 2 LEV: CPT | Performed by: ANESTHESIOLOGY

## 2022-02-15 RX ORDER — BUPIVACAINE HYDROCHLORIDE 2.5 MG/ML
INJECTION, SOLUTION EPIDURAL; INFILTRATION; INTRACAUDAL AS NEEDED
Status: DISCONTINUED | OUTPATIENT
Start: 2022-02-15 | End: 2022-02-15 | Stop reason: HOSPADM

## 2022-02-15 RX ORDER — DEXAMETHASONE SODIUM PHOSPHATE 10 MG/ML
INJECTION, SOLUTION INTRAMUSCULAR; INTRAVENOUS AS NEEDED
Status: DISCONTINUED | OUTPATIENT
Start: 2022-02-15 | End: 2022-02-15 | Stop reason: HOSPADM

## 2022-02-15 RX ORDER — ALPRAZOLAM 0.5 MG/1
0.5 TABLET ORAL ONCE
Status: COMPLETED | OUTPATIENT
Start: 2022-02-15 | End: 2022-02-15

## 2022-02-15 RX ORDER — LIDOCAINE HYDROCHLORIDE 10 MG/ML
INJECTION, SOLUTION EPIDURAL; INFILTRATION; INTRACAUDAL; PERINEURAL AS NEEDED
Status: DISCONTINUED | OUTPATIENT
Start: 2022-02-15 | End: 2022-02-15 | Stop reason: HOSPADM

## 2022-02-15 RX ADMIN — ALPRAZOLAM 0.5 MG: 0.5 TABLET ORAL at 07:18

## 2022-02-15 NOTE — INTERVAL H&P NOTE
H&P reviewed  After examining the patient I find no changes in the patients condition since the H&P had been written      Vitals:    02/15/22 0715   BP: 138/90   Pulse: 78   Resp: 20   Temp: 98 8 °F (37 1 °C)   SpO2: 98%

## 2022-02-15 NOTE — DISCHARGE INSTRUCTIONS
PLEASE SCHEDULE 2 WEEK FOLLOW UP BY CALLING THE [facility] AT [place]: [phone]      MEDIAL BRANCH BLOCK DISCHARGE INSTRUCTIONS      ACTIVITY  · Please do activities that will bring the normal pain that we are rating  For example, if vacuuming or walking increases the painm do that  Jonah twill give the most accurate response to the diary  · You may shower, but no tub baths today, or applied heat  CARE OF THE INJECTION SITE  · This area may be numb for several hours after the injection  · Notify the [facility] if you have any of the following: redness, drainage, swelling or fever above 100°F     SPECIAL INSTRUCTIONS  · Please return the MBB diary to our office by mail, fax, or drop it off  MEDICATIONS  · Please do not take any break through or short acting pain medications for 8 hours after the block  · Continue to take all routine medications  · Our office may have instructed you to hold some medications  · You may resume _______________________________________________  If you have any problems specifically related to your procedure, please call our office at [phone]  Problems not related to your procedure should be directed at your primary care physician  Lumbar Radiofrequency Ablation   WHAT YOU NEED TO KNOW:   What do I need to know about lumbar radiofrequency ablation? Lumbar radiofrequency ablation (RFA) is a procedure used to treat facet joint pain in your lower back  Facet joints are found at the back of each vertebra  A needle electrode is used to send electrical currents to the nerves in your facet joint  The electrical currents create heat that damages the nerve so it cannot send pain signals  How do I prepare for lumbar RFA? Your healthcare provider will talk to you about how to prepare for this procedure  He may tell you not to eat or drink anything after midnight on the day of your procedure   He will tell you what medicines to take or not take on the day of your procedure  What will happen during lumbar RFA? · You will lie on your stomach  You will be given local anesthesia to numb the area of your back where the needle electrode will be inserted  You may be given a sedative to help keep you relaxed  You may still feel pressure or pushing during the procedure, but you should not feel any pain  Your healthcare provider will use fluoroscopy (a type of x-ray) to guide the needle electrode to the nerves near your facet joint  · Your healthcare provider may touch the affected nerve to make sure the needle electrode is in the right place  You will feel tingling or pressure when he does this  He will then apply local anesthesia to the nerve to numb it  This will prevent you from feeling pain when he applies heat to the nerve  Your healthcare provider will then apply heat to the nerve using the needle electrode  He may need to apply heat to more than one nerve  He will remove the needle electrode and apply a bandage over the area  What are the risks of lumbar RFA? You may have pain, numbness, tingling, or burning in the area where the lumbar RFA was done  These normally go away within 6 weeks  The needle electrode may injure your spinal nerves  This may cause permanent leg weakness or nerve pain  CARE AGREEMENT:   You have the right to help plan your care  Learn about your health condition and how it may be treated  Discuss treatment options with your healthcare providers to decide what care you want to receive  You always have the right to refuse treatment  The above information is an  only  It is not intended as medical advice for individual conditions or treatments  Talk to your doctor, nurse or pharmacist before following any medical regimen to see if it is safe and effective for you    © Copyright [employer] 2021 Information is for End User's use only and may not be sold, redistributed or otherwise used for commercial purposes   All illustrations and images included in CareNotes® are the copyrighted property of A [employer] A M [employer]  or [facility] Laurie Hogan

## 2022-02-15 NOTE — OP NOTE
PERATIVE REPORT  PATIENT NAME: Geronimo Connor    :  1970  MRN: [id number]  Pt Location:  GI ROOM 01    SURGERY DATE: 2/15/2022    Surgeon(s) and Role: Sonia Walker MD - Primary    Preop Diagnosis:  Lumbar facet arthropathy [M47 816]    Post-Op Diagnosis Codes:     * Lumbar facet arthropathy [M47 816]    Procedure(s) (LRB):  BLOCK MEDIAL BRANCH L3, L4, L5 #1 (Bilateral)    Specimen(s):  * No specimens in log *    Estimated Blood Loss:   Minimal    Drains:  * No LDAs found *    Anesthesia Type:   Local    Operative Indications:  Lumbar facet arthropathy [M47 816]    Operative Findings:  Bilateral L3, L4, L5 medial branch nerve regions identified under fluoroscopic guidance      Complications:   None    Procedure and Technique:  Please see detailed procedure note     I was present for the entire procedure    Patient Disposition:  PACU       SIGNATURE: Alberto Aviles MD  DATE: February 15, 2022  TIME: 7:56 AM

## 2022-02-15 NOTE — PROCEDURES
Pre-procedure Diagnosis: Lumbar Facet Arthropathy  Post-procedure Diagnosis: Lumbar Facet Arthropathy  Procedure Title(s):  [BILATERAL L3, L4, L5] medial branch nerve blocks [(DIAGNOSTIC)]  Attending Surgeon:   Moshe Mitchell MD  Anesthesia:   Local     Indications: The patient is a 46y o  year-old female with a diagnosis of lumbar facet arthropathy  The patient's history and physical exam were reviewed  The risks, benefits and alternatives to the procedure were discussed, and all questions were answered to the patient's satisfaction  The patient agreed to proceed, and written informed consent was obtained  Procedure in Detail: The patient was brought into the procedure room and placed in the prone position on the fluoroscopy table  The area of the lumbar spine was prepped with chloraprep and then draped in a sterile manner  AP fluoroscopy was used to identify the [L3-L5] levels on the [LEFT] side  The C-arm was obliqued to visualize the junction of the superior articulate process and transverse process  The sacral ala was identified and marked  The skin in these identified areas was anesthetized with 1% lidocaine  A 22-gauge, 3½-inch spinal needle was advanced toward each of these points under fluoroscopic guidance  Once bone was contacted, negative aspiration was confirmed and [1-mL] of a [6mL]mixture of [5mL] [0 25% bupivicaine] and 1mL of 10mg/mL Dexamethasone was injected at each level  (The same procedure was performed on the opposite side )    After the procedure was completed, the patient's back was cleaned and bandages were placed at the needle insertion sites  Disposition: The patient tolerated the procedure well, and there were no apparent complications  The patient was taken to the recovery area where written discharge instructions for the procedure were given  The patient was given a pain diary to determine if the patient's pain improves following the injection   Once the diary is returned we will consider next appropriate course of treatment      Postoperative pain relief [WAS] significant    Estimated Blood Loss: None  Specimens Obtained: N/A

## 2022-02-22 ENCOUNTER — OFFICE VISIT (OUTPATIENT)
Dept: FAMILY MEDICINE CLINIC | Facility: CLINIC | Age: 52
End: 2022-02-22
Payer: COMMERCIAL

## 2022-02-22 VITALS
HEIGHT: 67 IN | WEIGHT: 209 LBS | DIASTOLIC BLOOD PRESSURE: 102 MMHG | TEMPERATURE: 97.1 F | BODY MASS INDEX: 32.8 KG/M2 | SYSTOLIC BLOOD PRESSURE: 174 MMHG

## 2022-02-22 DIAGNOSIS — R53.82 CHRONIC FATIGUE: ICD-10-CM

## 2022-02-22 DIAGNOSIS — Z00.00 ANNUAL PHYSICAL EXAM: Primary | ICD-10-CM

## 2022-02-22 DIAGNOSIS — E55.9 VITAMIN D DEFICIENCY: ICD-10-CM

## 2022-02-22 DIAGNOSIS — Z13.220 SCREENING FOR HYPERLIPIDEMIA: ICD-10-CM

## 2022-02-22 DIAGNOSIS — Z23 ENCOUNTER FOR IMMUNIZATION: ICD-10-CM

## 2022-02-22 DIAGNOSIS — Z12.12 SCREENING FOR COLORECTAL CANCER: ICD-10-CM

## 2022-02-22 DIAGNOSIS — Z12.31 ENCOUNTER FOR SCREENING MAMMOGRAM FOR BREAST CANCER: ICD-10-CM

## 2022-02-22 DIAGNOSIS — I10 ESSENTIAL HYPERTENSION: ICD-10-CM

## 2022-02-22 DIAGNOSIS — Z12.11 SCREENING FOR COLORECTAL CANCER: ICD-10-CM

## 2022-02-22 PROCEDURE — 1036F TOBACCO NON-USER: CPT | Performed by: FAMILY MEDICINE

## 2022-02-22 PROCEDURE — 99396 PREV VISIT EST AGE 40-64: CPT | Performed by: FAMILY MEDICINE

## 2022-02-22 RX ORDER — SPIRONOLACTONE 25 MG/1
25 TABLET ORAL DAILY
Qty: 30 TABLET | Refills: 5 | Status: SHIPPED | OUTPATIENT
Start: 2022-02-22 | End: 2022-03-09 | Stop reason: SDUPTHER

## 2022-02-22 NOTE — PROGRESS NOTES
[facility]    NAME: Bishop Watson  AGE: 46 y o  SEX: female  : 1970     DATE: 2022     Assessment and Plan:     Problem List Items Addressed This Visit     None          Immunizations and preventive care screenings were discussed with patient today  Appropriate education was printed on patient's after visit summary  Counseling:  Alcohol/drug use: discussed moderation in alcohol intake, the recommendations for healthy alcohol use, and avoidance of illicit drug use  Dental Health: discussed importance of regular tooth brushing, flossing, and dental visits  Injury prevention: discussed safety/seat belts, safety helmets, smoke detectors, carbon dioxide detectors, and smoking near bedding or upholstery  Sexual health: discussed sexually transmitted diseases, partner selection, use of condoms, avoidance of unintended pregnancy, and contraceptive alternatives  · Exercise: the importance of regular exercise/physical activity was discussed  Recommend exercise 3-5 times per week for at least 30 minutes  BMI Counseling: Body mass index is 32 73 kg/m²  The BMI is above normal  Nutrition recommendations include decreasing portion sizes, encouraging healthy choices of fruits and vegetables, moderation in carbohydrate intake and increasing intake of lean protein  Exercise recommendations include moderate physical activity 150 minutes/week  Rationale for BMI follow-up plan is due to patient being overweight or obese  No follow-ups on file  Chief Complaint:     Chief Complaint   Patient presents with    Physical Exam      History of Present Illness:     Adult Annual Physical   Patient here for a comprehensive physical exam  The patient reports no problems  Diet and Physical Activity  · Diet/Nutrition: well balanced diet  · Exercise: walking        Depression Screening  PHQ-2/9 Depression Screening         General Health  · Sleep: gets 4-6 hours of sleep on average  · Hearing: normal - bilateral   · Vision: most recent eye exam <1 year ago  · Dental: regular dental visits  /GYN Health  · Patient is: perimenopausal  · Last menstrual period: February  · Contraceptive method: None  Review of Systems:     Review of Systems   Constitutional: Negative for activity change, appetite change, diaphoresis, fatigue and fever  HENT: Positive for hearing loss  Negative for dental problem  Eyes: Negative  Negative for visual disturbance  Respiratory: Negative for apnea, cough, chest tightness, shortness of breath and wheezing  Cardiovascular: Negative for chest pain, palpitations and leg swelling  Gastrointestinal: Negative for abdominal distention, abdominal pain, anal bleeding, constipation, diarrhea, nausea and vomiting  Endocrine: Negative for cold intolerance, heat intolerance, polydipsia, polyphagia and polyuria  Genitourinary: Negative for difficulty urinating, dysuria, flank pain, hematuria and urgency  Musculoskeletal: Positive for back pain  Negative for arthralgias, gait problem, joint swelling and myalgias  Skin: Negative for color change, rash and wound  Allergic/Immunologic: Negative for environmental allergies, food allergies and immunocompromised state  Neurological: Negative for dizziness, seizures, syncope, speech difficulty, numbness and headaches  Hematological: Negative for adenopathy  Does not bruise/bleed easily  Psychiatric/Behavioral: Negative for agitation, behavioral problems, hallucinations, sleep disturbance and suicidal ideas        Past Medical History:     Past Medical History:   Diagnosis Date    Hyperlipidemia     Hypertension       Past Surgical History:     Past Surgical History:   Procedure Laterality Date    BREAST BIOPSY Left 2009    core bx; benign     CHOLECYSTECTOMY  1995    EPIDURAL BLOCK INJECTION Right 12/2/2021    Procedure: L4 and L5 TRANSFORAMINAL EPIDURAL STEROID INJECTION;  Surgeon: Felicita Pérez MD;  Location: [facility];  Service: Pain Management     EPIDURAL BLOCK INJECTION N/A 12/28/2021    Procedure: BLOCK / INJECTION EPIDURAL STEROID LUMBAR L4-L5;  Surgeon: Felicita Pérez MD;  Location: [facility];  Service: Pain Management     HYSTERECTOMY      age 26    NERVE BLOCK Bilateral 2/15/2022    Procedure: BLOCK MEDIAL BRANCH L3, L4, L5 #1;  Surgeon: Felicita Pérez MD;  Location: [facility];  Service: Pain Management     TOOTH EXTRACTION        Social History:     Social History     Socioeconomic History    Marital status: /Civil Union     Spouse name: None    Number of children: None    Years of education: None    Highest education level: None   Occupational History    None   Tobacco Use    Smoking status: Never Smoker    Smokeless tobacco: Never Used   Vaping Use    Vaping Use: Never used   Substance and Sexual Activity    Alcohol use: Yes     Comment: social drinker    Drug use: No    Sexual activity: Yes     Partners: Male     Birth control/protection: None     Comment: hysterectomy   Other Topics Concern    None   Social History Narrative    None     Social Determinants of Health     Financial Resource Strain: Not on file   Food Insecurity: Not on file   Transportation Needs: Not on file   Physical Activity: Not on file   Stress: Not on file   Social Connections: Not on file   Intimate Partner Violence: Not on file   Housing Stability: Not on file      Family History:     Family History   Problem Relation Age of Onset    Alzheimer's disease Mother     Lung cancer Mother     Heart disease Father         cardiac disorder    Prostate cancer Father     Breast cancer Sister     No Known Problems Daughter     Tongue cancer Maternal Grandmother     Lung cancer Maternal Grandfather     No Known Problems Paternal Grandmother     Lung cancer Paternal Grandfather     Emphysema Maternal Aunt     No Known Problems Sister     No Known Problems Sister     Breast cancer Cousin 39      Current Medications:     Current Outpatient Medications   Medication Sig Dispense Refill    acetaminophen (Tylenol) 325 mg tablet Take 650 mg by mouth every 6 (six) hours as needed for mild pain      Calcium-Vitamin D-Vitamin K (VIACTIV PO) Take by mouth daily      cetirizine (ZyrTEC) 10 MG chewable tablet Chew 10 mg daily      Cholecalciferol (RA VITAMIN D-3) 2000 units CAPS Take by mouth daily      desvenlafaxine succinate (PRISTIQ) 50 mg 24 hr tablet TAKE 1 TABLET DAILY 90 tablet 3    gabapentin (NEURONTIN) 300 mg capsule Take 1 capsule (300 mg total) by mouth 3 (three) times a day 270 capsule 0    ibuprofen (MOTRIN) 600 mg tablet Take 1 tablet (600 mg total) by mouth every 12 (twelve) hours as needed for moderate pain 180 tablet 0    metoprolol succinate (TOPROL-XL) 50 mg 24 hr tablet Take 50 mg by mouth daily      Multiple Vitamin (MULTIVITAMIN) tablet Take 1 tablet by mouth daily      pantoprazole (PROTONIX) 40 mg tablet Take 1 tablet (40 mg total) by mouth daily 90 tablet 0    rosuvastatin (CRESTOR) 10 MG tablet TAKE 1 TABLET DAILY 90 tablet 3     No current facility-administered medications for this visit  Allergies: Allergies   Allergen Reactions    Codeine Other (See Comments)     "throat swells"      Physical Exam:     BP (!) 174/102 (BP Location: Left arm, Patient Position: Sitting, Cuff Size: Standard)   Temp (!) 97 1 °F (36 2 °C) (Temporal)   Ht 5' 7" (1 702 m)   Wt 94 8 kg (209 lb)   BMI 32 73 kg/m²     Physical Exam  Constitutional:       Appearance: She is well-developed  HENT:      Head: Normocephalic and atraumatic  Right Ear: External ear normal       Left Ear: External ear normal       Nose: Nose normal    Eyes:      Conjunctiva/sclera: Conjunctivae normal       Pupils: Pupils are equal, round, and reactive to light  Cardiovascular:      Rate and Rhythm: Normal rate and regular rhythm  Heart sounds: Normal heart sounds  No murmur heard  No friction rub  Pulmonary:      Effort: Pulmonary effort is normal  No respiratory distress  Breath sounds: Normal breath sounds  No wheezing or rales  Chest:      Chest wall: No tenderness  Abdominal:      General: Bowel sounds are normal       Palpations: Abdomen is soft  Musculoskeletal:         General: Normal range of motion  Cervical back: Normal range of motion and neck supple  Skin:     General: Skin is warm and dry  Capillary Refill: Capillary refill takes 2 to 3 seconds  Neurological:      Mental Status: She is alert and oriented to person, place, and time  Psychiatric:         Behavior: Behavior normal          Thought Content:  Thought content normal          Judgment: Judgment normal           Jose Tinsley DO  [facility]

## 2022-02-22 NOTE — PATIENT INSTRUCTIONS
Wellness Visit for Adults   AMBULATORY CARE:   A wellness visit  is when you see your healthcare provider to get screened for health problems  Your healthcare provider will also give you advice on how to stay healthy  Write down your questions so you remember to ask them  Ask your healthcare provider how often you should have a wellness visit  What happens at a wellness visit:  Your healthcare provider will ask about your health, and your family history of health problems  This includes high blood pressure, heart disease, and cancer  He or she will ask if you have symptoms that concern you, if you smoke, and about your mood  You may also be asked about your intake of medicines, supplements, food, and alcohol  Any of the following may be done:  · Your weight  will be checked  Your height may also be checked so your body mass index (BMI) can be calculated  Your BMI shows if you are at a healthy weight  · Your blood pressure  and heart rate will be checked  Your temperature may also be checked  · Blood and urine tests  may be done  Blood tests may be done to check your cholesterol levels  Abnormal cholesterol levels increase your risk for heart disease and stroke  You may also need a blood or urine test to check for diabetes if you are at increased risk  Urine tests may be done to look for signs of an infection or kidney disease  · A physical exam  includes checking your heartbeat and lungs with a stethoscope  Your healthcare provider may also check your skin to look for sun damage  · Screening tests  may be recommended  A screening test is done to check for diseases that may not cause symptoms  The screening tests you may need depend on your age, gender, family history, and lifestyle habits  For example, colorectal screening may be recommended if you are 48years old or older  Screening tests you need if you are a woman:   · A Pap smear  is used to screen for cervical cancer   Pap smears are usually done every 3 to 5 years depending on your age  You may need them more often if you have had abnormal Pap smear test results in the past  Ask your healthcare provider how often you should have a Pap smear  · A mammogram  is an x-ray of your breasts to screen for breast cancer  Experts recommend mammograms every 2 years starting at age 48 years  You may need a mammogram at age 52 years or younger if you have an increased risk for breast cancer  Talk to your healthcare provider about when you should start having mammograms and how often you need them  Vaccines you may need:   · Get an influenza vaccine  every year  The influenza vaccine protects you from the flu  Several types of viruses cause the flu  The viruses change over time, so new vaccines are made each year  · Get a tetanus-diphtheria (Td) booster vaccine  every 10 years  This vaccine protects you against tetanus and diphtheria  Tetanus is a severe infection that may cause painful muscle spasms and lockjaw  Diphtheria is a severe bacterial infection that causes a thick covering in the back of your mouth and throat  · Get a human papillomavirus (HPV) vaccine  if you are female and aged 23 to 32 or male 23 to 24 and never received it  This vaccine protects you from HPV infection  HPV is the most common infection spread by sexual contact  HPV may also cause vaginal, penile, and anal cancers  · Get a pneumococcal vaccine  if you are aged 72 years or older  The pneumococcal vaccine is an injection given to protect you from pneumococcal disease  Pneumococcal disease is an infection caused by pneumococcal bacteria  The infection may cause pneumonia, meningitis, or an ear infection  · Get a shingles vaccine  if you are 60 or older, even if you have had shingles before  The shingles vaccine is an injection to protect you from the varicella-zoster virus  This is the same virus that causes chickenpox   Shingles is a painful rash that develops in people who had chickenpox or have been exposed to the virus  How to eat healthy:  My Plate is a model for planning healthy meals  It shows the types and amounts of foods that should go on your plate  Fruits and vegetables make up about half of your plate, and grains and protein make up the other half  A serving of dairy is included on the side of your plate  The amount of calories and serving sizes you need depends on your age, gender, weight, and height  Examples of healthy foods are listed below:  · Eat a variety of vegetables  such as dark green, red, and orange vegetables  You can also include canned vegetables low in sodium (salt) and frozen vegetables without added butter or sauces  · Eat a variety of fresh fruits , canned fruit in 100% juice, frozen fruit, and dried fruit  · Include whole grains  At least half of the grains you eat should be whole grains  Examples include whole-wheat bread, wheat pasta, brown rice, and whole-grain cereals such as oatmeal     · Eat a variety of protein foods such as seafood (fish and shellfish), lean meat, and poultry without skin (turkey and chicken)  Examples of lean meats include pork leg, shoulder, or tenderloin, and beef round, sirloin, tenderloin, and extra lean ground beef  Other protein foods include eggs and egg substitutes, beans, peas, soy products, nuts, and seeds  · Choose low-fat dairy products such as skim or 1% milk or low-fat yogurt, cheese, and cottage cheese  · Limit unhealthy fats  such as butter, hard margarine, and shortening  Exercise:  Exercise at least 30 minutes per day on most days of the week  Some examples of exercise include walking, biking, dancing, and swimming  You can also fit in more physical activity by taking the stairs instead of the elevator or parking farther away from stores  Include muscle strengthening activities 2 days each week  Regular exercise provides many health benefits   It helps you manage your weight, and decreases your risk for type 2 diabetes, heart disease, stroke, and high blood pressure  Exercise can also help improve your mood  Ask your healthcare provider about the best exercise plan for you  General health and safety guidelines:   · Do not smoke  Nicotine and other chemicals in cigarettes and cigars can cause lung damage  Ask your healthcare provider for information if you currently smoke and need help to quit  E-cigarettes or smokeless tobacco still contain nicotine  Talk to your healthcare provider before you use these products  · Limit alcohol  A drink of alcohol is 12 ounces of beer, 5 ounces of wine, or 1½ ounces of liquor  · Lose weight, if needed  Being overweight increases your risk of certain health conditions  These include heart disease, high blood pressure, type 2 diabetes, and certain types of cancer  · Protect your skin  Do not sunbathe or use tanning beds  Use sunscreen with a SPF 15 or higher  Apply sunscreen at least 15 minutes before you go outside  Reapply sunscreen every 2 hours  Wear protective clothing, hats, and sunglasses when you are outside  · Drive safely  Always wear your seatbelt  Make sure everyone in your car wears a seatbelt  A seatbelt can save your life if you are in an accident  Do not use your cell phone when you are driving  This could distract you and cause an accident  Pull over if you need to make a call or send a text message  · Practice safe sex  Use latex condoms if are sexually active and have more than one partner  Your healthcare provider may recommend screening tests for sexually transmitted infections (STIs)  · Wear helmets, lifejackets, and protective gear  Always wear a helmet when you ride a bike or motorcycle, go skiing, or play sports that could cause a head injury  Wear protective equipment when you play sports  Wear a lifejacket when you are on a boat or doing water sports      © Copyright [employer] 2021 Information is for End User's use only and may not be sold, redistributed or otherwise used for commercial purposes  All illustrations and images included in CareNotes® are the copyrighted property of [employer]  or Pinky Hogan  The above information is an  only  It is not intended as medical advice for individual conditions or treatments  Talk to your doctor, nurse or pharmacist before following any medical regimen to see if it is safe and effective for you  Weight Management   AMBULATORY CARE:   Why it is important to manage your weight:  Being overweight increases your risk of health conditions such as heart disease, high blood pressure, type 2 diabetes, and certain types of cancer  It can also increase your risk for osteoarthritis, sleep apnea, and other respiratory problems  Aim for a slow, steady weight loss  Even a small amount of weight loss can lower your risk of health problems  How to lose weight safely:  A safe and healthy way to lose weight is to eat fewer calories and get regular exercise  · You can lose up about 1 pound a week by decreasing the number of calories you eat by 500 calories each day  You can decrease calories by eating smaller portion sizes or by cutting out high-calorie foods  Read labels to find out how many calories are in the foods you eat  · You can also burn calories with exercise such as walking, swimming, or biking  You will be more likely to keep weight off if you make these changes part of your lifestyle  Exercise at least 30 minutes per day on most days of the week  You can also fit in more physical activity by taking the stairs instead of the elevator or parking farther away from stores  Ask your healthcare provider about the best exercise plan for you  Healthy meal plan for weight management:  A healthy meal plan includes a variety of foods, contains fewer calories, and helps you stay healthy   A healthy meal plan includes the following:     · Eat whole-grain foods more often  A healthy meal plan should contain fiber  Fiber is the part of grains, fruits, and vegetables that is not broken down by your body  Whole-grain foods are healthy and provide extra fiber in your diet  Some examples of whole-grain foods are whole-wheat breads and pastas, oatmeal, brown rice, and bulgur  · Eat a variety of vegetables every day  Include dark, leafy greens such as spinach, kale, izabella greens, and mustard greens  Eat yellow and orange vegetables such as carrots, sweet potatoes, and winter squash  · Eat a variety of fruits every day  Choose fresh or canned fruit (canned in its own juice or light syrup) instead of juice  Fruit juice has very little or no fiber  · Eat low-fat dairy foods  Drink fat-free (skim) milk or 1% milk  Eat fat-free yogurt and low-fat cottage cheese  Try low-fat cheeses such as mozzarella and other reduced-fat cheeses  · Choose meat and other protein foods that are low in fat  Choose beans or other legumes such as split peas or lentils  Choose fish, skinless poultry (chicken or turkey), or lean cuts of red meat (beef or pork)  Before you cook meat or poultry, cut off any visible fat  · Use less fat and oil  Try baking foods instead of frying them  Add less fat, such as margarine, sour cream, regular salad dressing and mayonnaise to foods  Eat fewer high-fat foods  Some examples of high-fat foods include french fries, doughnuts, ice cream, and cakes  · Eat fewer sweets  Limit foods and drinks that are high in sugar  This includes candy, cookies, regular soda, and sweetened drinks  Ways to decrease calories:   · Eat smaller portions  ? Use a small plate with smaller servings  ? Do not eat second helpings  ? When you eat at a restaurant, ask for a box and place half of your meal in the box before you eat  ? Share an entrée with someone else  · Replace high-calorie snacks with healthy, low-calorie snacks  ? Choose fresh fruit, vegetables, fat-free rice cakes, or air-popped popcorn instead of potato chips, nuts, or chocolate  ? Choose water or calorie-free drinks instead of soda or sweetened drinks  · Do not shop for groceries when you are hungry  You may be more likely to make unhealthy food choices  Take a grocery list of healthy foods and shop after you have eaten  · Eat regular meals  Do not skip meals  Skipping meals can lead to overeating later in the day  This can make it harder for you to lose weight  Eat a healthy snack in place of a meal if you do not have time to eat a regular meal  Talk with a dietitian to help you create a meal plan and schedule that is right for you  Other things to consider as you try to lose weight:   · Be aware of situations that may give you the urge to overeat, such as eating while watching television  Find ways to avoid these situations  For example, read a book, go for a walk, or do crafts  · Meet with a weight loss support group or friends who are also trying to lose weight  This may help you stay motivated to continue working on your weight loss goals  © Copyright [employer] 2021 Information is for End User's use only and may not be sold, redistributed or otherwise used for commercial purposes  All illustrations and images included in CareNotes® are the copyrighted property of A D A M [employer] Inc  or Pinky Hogan  The above information is an  only  It is not intended as medical advice for individual conditions or treatments  Talk to your doctor, nurse or pharmacist before following any medical regimen to see if it is safe and effective for you  Cholesterol and Your Health   AMBULATORY CARE:   Cholesterol  is a waxy, fat-like substance  Your body uses cholesterol to make hormones and new cells, and to protect nerves  Cholesterol is made by your body  It also comes from certain foods you eat, such as meat and dairy products   Your healthcare provider can help you set goals for your cholesterol levels  He or she can help you create a plan to meet your goals  Cholesterol level goals: Your cholesterol level goals depend on your risk for heart disease, your age, and your other health conditions  The following are general guidelines:  · Total cholesterol  includes low-density lipoprotein (LDL), high-density lipoprotein (HDL), and triglyceride levels  The total cholesterol level should be lower than 200 mg/dL and is best at about 150 mg/dL  · LDL cholesterol  is called bad cholesterol  because it forms plaque in your arteries  As plaque builds up, your arteries become narrow, and less blood flows through  When plaque decreases blood flow to your heart, you may have chest pain  If plaque completely blocks an artery that brings blood to your heart, you may have a heart attack  Plaque can break off and form blood clots  Blood clots may block arteries in your brain and cause a stroke  The level should be less than 130 mg/dL and is best at about 100 mg/dL  · HDL cholesterol  is called good cholesterol  because it helps remove LDL cholesterol from your arteries  It does this by attaching to LDL cholesterol and carrying it to your liver  Your liver breaks down LDL cholesterol so your body can get rid of it  High levels of HDL cholesterol can help prevent a heart attack and stroke  Low levels of HDL cholesterol can increase your risk for heart disease, heart attack, and stroke  The level should be 60 mg/dL or higher  · Triglycerides  are a type of fat that store energy from foods you eat  High levels of triglycerides also cause plaque buildup  This can increase your risk for a heart attack or stroke  If your triglyceride level is high, your LDL cholesterol level may also be high  The level should be less than 150 mg/dL      Any of the following can increase your risk for high cholesterol:   · Smoking cigarettes    · Being overweight or obese, or not getting enough exercise    · Drinking large amounts of alcohol    · A medical condition such as hypertension (high blood pressure) or diabetes    · Certain genes passed from your parents to you    · Age older than 65 years    What you need to know about having your cholesterol levels checked: Adults 21to 39years of age should have their cholesterol levels checked every 4 to 6 years  Adults 45 years or older should have their cholesterol checked every 1 to 2 years  You may need your cholesterol checked more often, or at a younger age, if you have risk factors for heart disease  You may also need to have your cholesterol checked more often if you have other health conditions, such as diabetes  Blood tests are used to check cholesterol levels  Blood tests measure your levels of triglycerides, LDL cholesterol, and HDL cholesterol  How healthy fats affect your cholesterol levels:  Healthy fats, also called unsaturated fats, help lower LDL cholesterol and triglyceride levels  Healthy fats include the following:  · Monounsaturated fats  are found in foods such as olive oil, canola oil, avocado, nuts, and olives  · Polyunsaturated fats,  such as omega 3 fats, are found in fish, such as salmon, trout, and tuna  They can also be found in plant foods such as flaxseed, walnuts, and soybeans  How unhealthy fats affect your cholesterol levels:  Unhealthy fats increase LDL cholesterol and triglyceride levels  They are found in foods high in cholesterol, saturated fat, and trans fat:  · Cholesterol  is found in eggs, dairy, and meat  · Saturated fat  is found in butter, cheese, ice cream, whole milk, and coconut oil  Saturated fat is also found in meat, such as sausage, hot dogs, and bologna  · Trans fat  is found in liquid oils and is used in fried and baked foods  Foods that contain trans fats include chips, crackers, muffins, sweet rolls, microwave popcorn, and cookies      Treatment  for high cholesterol will also decrease your risk of heart disease, heart attack, and stroke  Treatment may include any of the following:  · Lifestyle changes  may include food, exercise, weight loss, and quitting smoking  You may also need to decrease the amount of alcohol you drink  Your healthcare provider will want you to start with lifestyle changes  Other treatment may be added if lifestyle changes are not enough  Your healthcare provider may recommend you work with a team to manage hyperlipidemia  The team may include medical experts such as a dietitian, an exercise or physical therapist, and a behavior therapist  Your family members may be included in helping you create lifestyle changes  · Medicines  may be given to lower your LDL cholesterol, triglyceride levels, or total cholesterol level  You may need medicines to lower your cholesterol if any of the following is true:    ? You have a history of stroke, TIA, unstable angina, or a heart attack  ? Your LDL cholesterol level is 190 mg/dL or higher  ? You are age 36 to 76 years, have diabetes or heart disease risk factors, and your LDL cholesterol is 70 mg/dL or higher  · Supplements  include fish oil, red yeast rice, and garlic  Fish oil may help lower your triglyceride and LDL cholesterol levels  It may also increase your HDL cholesterol level  Red yeast rice may help decrease your total cholesterol level and LDL cholesterol level  Garlic may help lower your total cholesterol level  Do not take any supplements without talking to your healthcare provider  Food changes you can make to lower your cholesterol levels:  A dietitian can help you create a healthy eating plan  He or she can show you how to read food labels and choose foods low in saturated fat, trans fats, and cholesterol  · Decrease the total amount of fat you eat  Choose lean meats, fat-free or 1% fat milk, and low-fat dairy products, such as yogurt and cheese   Try to limit or avoid red meats  Limit or do not eat fried foods or baked goods, such as cookies  · Replace unhealthy fats with healthy fats  Cook foods in olive oil or canola oil  Choose soft margarines that are low in saturated fat and trans fat  Seeds, nuts, and avocados are other examples of healthy fats  · Eat foods with omega-3 fats  Examples include salmon, tuna, mackerel, walnuts, and flaxseed  Eat fish 2 times per week  Pregnant women should not eat fish that have high levels of mercury, such as shark, swordfish, and daria mackerel  · Increase the amount of high-fiber foods you eat  High-fiber foods can help lower your LDL cholesterol  Aim to get between 20 and 30 grams of fiber each day  Fruits and vegetables are high in fiber  Eat at least 5 servings each day  Other high-fiber foods are whole-grain or whole-wheat breads, pastas, or cereals, and brown rice  Eat 3 ounces of whole-grain foods each day  Increase fiber slowly  You may have abdominal discomfort, bloating, and gas if you add fiber to your diet too quickly  · Eat healthy protein foods  Examples include low-fat dairy products, skinless chicken and turkey, fish, and nuts  · Limit foods and drinks that are high in sugar  Your dietitian or healthcare provider can help you create daily limits for high-sugar foods and drinks  The limit may be lower if you have diabetes or another health condition  Limits can also help you lose weight if needed  Lifestyle changes you can make to lower your cholesterol levels:   · Maintain a healthy weight  Ask your healthcare provider what a healthy weight is for you  Ask him or her to help you create a weight loss plan if needed  Weight loss can decrease your total cholesterol and triglyceride levels  Weight loss may also help keep your blood pressure at a healthy level  · Be physically active throughout the day    Physical activity, such as exercise, can help lower your total cholesterol level and maintain a healthy weight  Physical activity can also help increase your HDL cholesterol level  Work with your healthcare provider to create an program that is right for you  Get at least 30 to 40 minutes of moderate physical activity most days of the week  Examples of exercise include brisk walking, swimming, or biking  Also include strength training at least 2 times each week  Your healthcare providers can help you create a physical activity plan  · Do not smoke  Nicotine and other chemicals in cigarettes and cigars can raise your cholesterol levels  Ask your healthcare provider for information if you currently smoke and need help to quit  E-cigarettes or smokeless tobacco still contain nicotine  Talk to your healthcare provider before you use these products  · Limit or do not drink alcohol  Alcohol can increase your triglyceride levels  Ask your healthcare provider before you drink alcohol  Ask how much is okay for you to drink in 24 hours or 1 week  Follow up with your doctor as directed:  Write down your questions so you remember to ask them during your visits  © Copyright [employer] 2021 Information is for End User's use only and may not be sold, redistributed or otherwise used for commercial purposes  All illustrations and images included in CareNotes® are the copyrighted property of [employer]  or [address] [place], [place] [id number] Tirado   The above information is an  only  It is not intended as medical advice for individual conditions or treatments  Talk to your doctor, nurse or pharmacist before following any medical regimen to see if it is safe and effective for you

## 2022-02-26 ENCOUNTER — APPOINTMENT (OUTPATIENT)
Dept: LAB | Facility: MEDICAL CENTER | Age: 52
End: 2022-02-26
Payer: COMMERCIAL

## 2022-02-26 DIAGNOSIS — Z13.220 SCREENING FOR HYPERLIPIDEMIA: ICD-10-CM

## 2022-02-26 DIAGNOSIS — E55.9 VITAMIN D DEFICIENCY: ICD-10-CM

## 2022-02-26 DIAGNOSIS — R53.82 CHRONIC FATIGUE: ICD-10-CM

## 2022-02-26 DIAGNOSIS — I10 ESSENTIAL HYPERTENSION: ICD-10-CM

## 2022-02-26 LAB
25(OH)D3 SERPL-MCNC: 31.2 NG/ML (ref 30–100)
ALBUMIN SERPL BCP-MCNC: 4.3 G/DL (ref 3.5–5)
ALP SERPL-CCNC: 66 U/L (ref 46–116)
ALT SERPL W P-5'-P-CCNC: 40 U/L (ref 12–78)
ANION GAP SERPL CALCULATED.3IONS-SCNC: 6 MMOL/L (ref 4–13)
AST SERPL W P-5'-P-CCNC: 23 U/L (ref 5–45)
BILIRUB SERPL-MCNC: 1.23 MG/DL (ref 0.2–1)
BUN SERPL-MCNC: 18 MG/DL (ref 5–25)
CALCIUM SERPL-MCNC: 10 MG/DL (ref 8.3–10.1)
CHLORIDE SERPL-SCNC: 104 MMOL/L (ref 100–108)
CHOLEST SERPL-MCNC: 211 MG/DL
CO2 SERPL-SCNC: 28 MMOL/L (ref 21–32)
CREAT SERPL-MCNC: 0.75 MG/DL (ref 0.6–1.3)
GFR SERPL CREATININE-BSD FRML MDRD: 91 ML/MIN/1.73SQ M
GLUCOSE P FAST SERPL-MCNC: 102 MG/DL (ref 65–99)
HDLC SERPL-MCNC: 61 MG/DL
LDLC SERPL CALC-MCNC: 117 MG/DL (ref 0–100)
NONHDLC SERPL-MCNC: 150 MG/DL
POTASSIUM SERPL-SCNC: 4.2 MMOL/L (ref 3.5–5.3)
PROT SERPL-MCNC: 7.8 G/DL (ref 6.4–8.2)
SODIUM SERPL-SCNC: 138 MMOL/L (ref 136–145)
TRIGL SERPL-MCNC: 165 MG/DL
TSH SERPL DL<=0.05 MIU/L-ACNC: 0.77 UIU/ML (ref 0.36–3.74)

## 2022-02-26 PROCEDURE — 80053 COMPREHEN METABOLIC PANEL: CPT

## 2022-02-26 PROCEDURE — 36415 COLL VENOUS BLD VENIPUNCTURE: CPT

## 2022-02-26 PROCEDURE — 80061 LIPID PANEL: CPT

## 2022-02-26 PROCEDURE — 84443 ASSAY THYROID STIM HORMONE: CPT

## 2022-02-26 PROCEDURE — 82306 VITAMIN D 25 HYDROXY: CPT

## 2022-02-28 ENCOUNTER — OFFICE VISIT (OUTPATIENT)
Dept: PAIN MEDICINE | Facility: CLINIC | Age: 52
End: 2022-02-28
Payer: COMMERCIAL

## 2022-02-28 VITALS
WEIGHT: 203 LBS | HEART RATE: 89 BPM | DIASTOLIC BLOOD PRESSURE: 88 MMHG | RESPIRATION RATE: 20 BRPM | TEMPERATURE: 98 F | HEIGHT: 67 IN | BODY MASS INDEX: 31.86 KG/M2 | SYSTOLIC BLOOD PRESSURE: 124 MMHG

## 2022-02-28 DIAGNOSIS — F41.8 ANXIETY ASSOCIATED WITH DEPRESSION: ICD-10-CM

## 2022-02-28 DIAGNOSIS — M47.816 LUMBAR SPONDYLOSIS: ICD-10-CM

## 2022-02-28 DIAGNOSIS — F41.9 ANXIETY: Primary | ICD-10-CM

## 2022-02-28 DIAGNOSIS — M54.16 LUMBAR RADICULOPATHY: ICD-10-CM

## 2022-02-28 DIAGNOSIS — M47.816 LUMBAR FACET ARTHROPATHY: ICD-10-CM

## 2022-02-28 PROCEDURE — 3008F BODY MASS INDEX DOCD: CPT | Performed by: FAMILY MEDICINE

## 2022-02-28 PROCEDURE — 99214 OFFICE O/P EST MOD 30 MIN: CPT | Performed by: ANESTHESIOLOGY

## 2022-02-28 PROCEDURE — 3008F BODY MASS INDEX DOCD: CPT | Performed by: ANESTHESIOLOGY

## 2022-02-28 PROCEDURE — 1036F TOBACCO NON-USER: CPT | Performed by: ANESTHESIOLOGY

## 2022-02-28 RX ORDER — GABAPENTIN 300 MG/1
300 CAPSULE ORAL 3 TIMES DAILY
Qty: 270 CAPSULE | Refills: 0 | Status: SHIPPED | OUTPATIENT
Start: 2022-02-28

## 2022-02-28 RX ORDER — LIDOCAINE HYDROCHLORIDE 10 MG/ML
10 INJECTION, SOLUTION EPIDURAL; INFILTRATION; INTRACAUDAL; PERINEURAL ONCE
Status: CANCELLED | OUTPATIENT
Start: 2022-02-28 | End: 2022-02-28

## 2022-02-28 RX ORDER — BUPIVACAINE HYDROCHLORIDE 7.5 MG/ML
5 INJECTION, SOLUTION EPIDURAL; RETROBULBAR ONCE
Status: CANCELLED | OUTPATIENT
Start: 2022-02-28 | End: 2022-02-28

## 2022-02-28 RX ORDER — DESVENLAFAXINE 50 MG/1
50 TABLET, EXTENDED RELEASE ORAL DAILY
Qty: 90 TABLET | Refills: 3 | Status: SHIPPED | OUTPATIENT
Start: 2022-02-28 | End: 2022-03-02 | Stop reason: SDUPTHER

## 2022-02-28 RX ORDER — ALPRAZOLAM 0.5 MG/1
0.5 TABLET ORAL
Qty: 4 TABLET | Refills: 0 | Status: SHIPPED | OUTPATIENT
Start: 2022-02-28 | End: 2022-03-30

## 2022-02-28 NOTE — PATIENT INSTRUCTIONS
Core Strengthening Exercises   WHAT YOU NEED TO KNOW:   Your core includes the muscles of your lower back, hip, pelvis, and abdomen  Core strengthening exercises help heal and strengthen these muscles  This helps prevent another injury, and keeps your pelvis, spine, and hips in the correct position  DISCHARGE INSTRUCTIONS:   Contact your healthcare provider if:   · You have sharp or worsening pain during exercise or at rest     · You have questions or concerns about your shoulder exercises  Safety tips:  Talk to your healthcare provider before you start an exercise program  A physical therapist can teach you how to do core strengthening exercises safely  · Do the exercises on a mat or firm surface  A firm surface will support your spine and prevent low back pain  Do not do these exercises on a bed  · Move slowly and smoothly  Avoid fast or jerky motions  · Stop if you feel pain  Core exercises should not be painful  Stop if you feel pain  · Breathe normally during core exercises  Do not hold your breath  This may cause an increase in blood pressure and prevent muscle strengthening  Your healthcare provider will tell you when to inhale and exhale during the exercise  · Begin all of your exercises with abdominal bracing  Abdominal bracing helps warm up your core muscles  You can also practice abdominal bracing throughout the day  Lie on your back with your knees bent and feet flat on the floor  Place your arms in a relaxed position beside your body  Tighten your abdominal muscles  Pull your belly button in and up toward your spine  Hold for 5 seconds  Relax your muscles  Repeat 10 times  Core strengthening exercises: Your healthcare provider will tell you how often to do these exercises  The provider will also tell you how many repetitions of each exercise you should do  Hold each exercise for 5 seconds or as directed  As you get stronger, increase your hold to 10 to 15 seconds   You can do some of these exercises on a stability ball, or with a weight  Ask your healthcare provider how to use a stability ball or weight for these exercises:  · Bridging:  Lie on your back with your knees bent and feet flat on the floor  Rest your arms at your side  Tighten your buttocks, and then lift your hips 1 inch off the floor  Hold for 5 seconds  When you can do this exercise without pain for 10 seconds, increase the distance you lift your hips  A good goal is to be able to lift your hips so that your shoulders, hips, and knees are in a straight line  · Dead bug:  Lie on your back with your knees bent and feet flat on the floor  Place your arms in a relaxed position beside your body  Begin with abdominal bracing  Next, raise one leg, keeping your knee bent  Hold for 5 seconds  Repeat with the other leg  When you can do this exercise without pain for 10 to 15 seconds, you may raise one straight leg and hold  Repeat with the other leg  · Quadruped:  Place your hands and knees on the floor  Keep your wrists directly below your shoulders and your knees directly below your hips  Pull your belly button in toward your spine  Do not flatten or arch your back  Tighten your abdominal muscles below your belly button  Hold for 5 seconds  When you can do this exercise without pain for 10 to 15 seconds, you may extend one arm and hold  Repeat on the other side  · Side bridge exercises:      ? Standing side bridge:  Stand next to a wall and extend one arm toward the wall  Place your palm flat on the wall with your fingers pointing upward  Begin with abdominal bracing  Next, without moving your feet, slowly bend your arm to 90 degrees  Hold for 5 seconds  Repeat on the other side  When you can do this exercise without pain for 10 to 15 seconds, you may do the bent leg side bridge on the floor  ? Bent leg side bridge:  Lie on one side with your legs, hips, and shoulders in a straight line   Prop yourself up onto your forearm so your elbow is directly below your shoulder  Bend your knees back to 90 degrees  Begin with abdominal bracing  Next, lift your hips and balance yourself on your forearm and knees  Hold for 5 seconds  Repeat on the other side  When you can do this exercise without pain for 10 to 15 seconds, you may do the straight leg side bridge on the floor  ? Straight leg side bridge:  Lie on one side with your legs, hips, and shoulders in a straight line  Prop yourself up onto your forearm so your elbow is directly below your shoulder  Begin with abdominal bracing  Lift your hips off the floor and balance yourself on your forearm and the outside of your flexed foot  Do not let your ankle bend sideways  Hold for 5 seconds  Repeat on the other side  When you can do this exercise without pain for 10 to 15 seconds, ask your healthcare provider for more advanced exercises  · Superman:  Lie on your stomach  Extend your arms forward on the floor  Tighten your abdominal muscles and lift your right hand and left leg off the floor  Hold this position  Slowly return to the starting position  Tighten your abdominal muscles and lift your left hand and right leg off the floor  Hold this position  Slowly return to the starting position  · Clam:  Lie on your side with your knees bent  Put your bottom arm under your head to keep your neck in line  Put your top hand on your hip to keep your pelvis from moving  Put your heels together, and keep them together during this exercise  Slowly raise your top knee toward the ceiling  Then lower your leg so your knees are together  Repeat this exercise 10 times  Then switch sides and do the exercise 10 times with the other leg  · Curl up:  Lie on your back with your knees bent and feet flat on the floor  Place your hands, palms down, underneath your lower back   Next, with your elbows on the floor, lift your shoulders and chest 2 to 3 inches off the floor  Keep your head in line with your shoulders  Hold this position  Slowly return to the starting position  · Straight leg raises:  Lie on your back with one leg straight  Bend the other knee and place your foot flat on the floor  Tighten your abdominal muscles  Keep your leg straight and slowly lift it straight up 6 to 12 inches off the floor  Hold this position  Lower your leg slowly  Do as many repetitions as directed on this side  Repeat with the other leg  · Plank:  Lie on your stomach  Bend your elbows and place your forearms flat on the floor  Lift your chest, stomach, and knees off the floor  Make sure your elbows are below your shoulders  Your body should be in a straight line  Do not let your hips or lower back sink to the ground  Squeeze your abdominal muscles together and hold for 15 seconds  To make this exercise harder, hold for 30 seconds or lift 1 leg at a time  · Bicycles:  Lie on your back  Bend both knees and bring them toward your chest  Your calves should be parallel to the floor  Place the palms of your hands on the back of your head  Straighten your right leg and keep it lifted 2 inches off the floor  Raise your head and shoulders off the floor and twist towards your left  Keep your head and shoulders lifted  Bend your right knee while you straighten your left leg  Keep your left leg 2 inches off the floor  Twist your head and chest towards the left leg  Continue to straighten 1 leg at a time and twist        Follow up with your doctor as directed:  Write down your questions so you remember to ask them during your visits  © Copyright [employer] 2022 Information is for End User's use only and may not be sold, redistributed or otherwise used for commercial purposes  All illustrations and images included in CareNotes® are the copyrighted property of [employer] A M [employer] Inc  or [facility] Laurie Tirado   The above information is an  only   It is not intended as medical advice for individual conditions or treatments  Talk to your doctor, nurse or pharmacist before following any medical regimen to see if it is safe and effective for you

## 2022-02-28 NOTE — H&P (VIEW-ONLY)
Assessment  1  Anxiety  -     ALPRAZolam (XANAX) 0 5 mg tablet; Take 1 tablet (0 5 mg total) by mouth 30 min pre-procedure    2  Lumbar spondylosis - Bilateral  -     Case request operating room: BLOCK MEDIAL BRANCH L3, L4, L5 #2; Standing  -     Case request operating room: BLOCK MEDIAL BRANCH L3, L4, L5 #2    3  Lumbar facet arthropathy  -     Case request operating room: BLOCK MEDIAL BRANCH L3, L4, L5 #2; Standing  -     Case request operating room: BLOCK MEDIAL BRANCH L3, L4, L5 #2    4  Lumbar radiculopathy  -     gabapentin (NEURONTIN) 300 mg capsule; Take 1 capsule (300 mg total) by mouth 3 (three) times a day    Greater than 90% relief of pain with improved ability to participate with IADLs after bilateral L3, L4, L5 medial branch blocks #1  Prior to this described axial low back pain described primarily arthritic features  Positive facet loading maneuvers consistent with prominent lumbar facet arthropathy noted on MRI lumbar spine  Risks benefits and alternatives to lumbar medial branch blocks and subsequent radiofrequency ablation of successful thoroughly discussed with patient  Handouts provided and questions answered to satisfaction  Previously reported the following symptomatology:     MRI findings show mild noncompressive lumbar degenerative Disc disease with lumbar facet arthropathy noted at lower lumbar levels  Mild weakness with right hip flexion, knee extension and right ankle dorsiflexion  Additionally has pain with bilateral facet loading maneuvers and lumbar spine as well as with axial rotation  Reports symptomatology of lumbar radiculopathy in right L4 and L5 dermatomal distributions accompanied by weakness numbness and paresthesias at times  Physical therapy has improved her symptoms to a moderate extent  Reasonable to proceed with right L4 and L5 TFESI to target radicular pain at this time as part of multimodal pain therapy plan    If symptomatology lumbar facet arthropathy are more prevalent, we may proceed with medial branch blocks  All interventions including epidural steroid injections, medial branch blocks and subsequent radiofrequency ablation if successful were thoroughly discussed with patient  Handouts provided and questions answered to patient's satisfaction  Plan  -bilateral L3, L4, L5 medial blocks #2; f/u 2 weeks post procedure  -gabapentin increased to 300 mg t i d  Ordered for patient; counseled regarding sedative effects of taking this medication and provided up titration calendar  Counseled not to take medication while driving or operating heavy machinery/using stairs  -  Ibuprofen 600 mg t i d  prn pain previously Prescribed  Patient educated regarding bleeding risk of taking this medication not taking any other nonsteroidal anti-inflammatory medications while taking this medication; counseled thoroughly regarding potential risk of Cardiovascular injury, Kidney injury, Gastrointestinal ulceration/bleeding  Patient voiced understanding; gi ppx additionally written  -physical therapy for right-sided lumbar radiculopathy, facet arthropathy; Core exercises additionally provided for physician directed home PT that the patient plans to participate with for 1 hour, twice a week for the next 6 weeks  There are risks associated with opioid medications, including dependence, addiction and tolerance  The patient understands and agrees to use these medications only as prescribed  Potential side effects of the medications include, but are not limited to, constipation, drowsiness, addiction, impaired judgment and risk of fatal overdose if not taken as prescribed  The patient was warned against driving while taking sedation medications  Sharing medications is a felony  At this point in time, the patient is showing no signs of addiction, abuse, diversion or suicidal ideation      [address] [facility] Prescription Drug Monitoring Program report was reviewed and was appropriate Complete risks and benefits including bleeding, infection, tissue reaction, nerve injury and allergic reaction were discussed  The approach was demonstrated using models and literature was provided  Verbal and written consent was obtained  My impressions and treatment recommendations were discussed in detail with the patient who verbalized understanding and had no further questions  Discharge instructions were provided  I personally saw and examined the patient and I agree with the above discussed plan of care  New Medications Ordered This Visit   Medications    ALPRAZolam (XANAX) 0 5 mg tablet     Sig: Take 1 tablet (0 5 mg total) by mouth 30 min pre-procedure     Dispense:  4 tablet     Refill:  0    gabapentin (NEURONTIN) 300 mg capsule     Sig: Take 1 capsule (300 mg total) by mouth 3 (three) times a day     Dispense:  270 capsule     Refill:  0       History of Present Illness    Greater than 90% relief of pain with improved ability to participate with IADLs after bilateral L3, L4, L5 medial branch blocks #1  Prior to this described axial low back pain described primarily arthritic features  Positive facet loading maneuvers consistent with prominent lumbar facet arthropathy noted on MRI lumbar spine  Risks benefits and alternatives to lumbar medial branch blocks and subsequent radiofrequency ablation of successful thoroughly discussed with patient  Handouts provided and questions answered to satisfaction  Previously reported the following symptomatology:     Adonay Lang is a 46 y o  female  With past medical history of hypercholesterolemia presenting with 12 week history of lumbar radicular pain in right L4 and L5 dermatomal distribution accompanied by weakness numbness and paresthesias  The patient rates the pain at a 8/10 accompanied by electric shock-like shooting features and crampy burning pain in the aforementioned dermatomal distributions    She additionally notes symptomatology of aching, nagging, indolent, stabbing, throbbing features of the pain in her back, symptomatic of arthritic like low back pain  The pain is worse in the mornings as well as the end of the day; exertion such as walking for long periods of time seems to exacerbate the pain  The patient can hardly walk more than a few blocks without having debilitating pain  She tries to maintain an active lifestyle and finds that the current degree of pain seems to compromises her efforts  The pain significantly impacts independent activities of daily living and contributes to significant disability  She has attempted a 1 month course of physical therapy with moderate relief of the pain  She has taken ibuprofen as well as flexeril with limited relief of the pain as well  She has never tried epidural steroid injections in the past  She denies any bowel or bladder dysfunction/incontinence    I have personally reviewed and/or updated the patient's past medical history, past surgical history, family history, social history, current medications, allergies, and vital signs today  Review of Systems   Constitutional: Positive for activity change  HENT: Negative  Eyes: Negative  Respiratory: Negative  Cardiovascular: Negative  Gastrointestinal: Negative  Endocrine: Negative  Genitourinary: Negative  Musculoskeletal: Positive for arthralgias, back pain, gait problem and myalgias  Skin: Negative  Allergic/Immunologic: Negative  Neurological: Positive for weakness and numbness  Hematological: Negative  Psychiatric/Behavioral: Negative  All other systems reviewed and are negative        Patient Active Problem List   Diagnosis    Mixed hyperlipidemia    Hypertension    Anxiety    Vitamin D deficiency    Lumbar facet arthropathy    Lumbar radiculopathy       Past Medical History:   Diagnosis Date    Hyperlipidemia     Hypertension        Past Surgical History:   Procedure Laterality Date    BREAST BIOPSY Left 2009    core bx; benign     CHOLECYSTECTOMY  1995    EPIDURAL BLOCK INJECTION Right 12/2/2021    Procedure: L4 and L5 TRANSFORAMINAL EPIDURAL STEROID INJECTION;  Surgeon: Yaneth Bhatt MD;  Location: [facility];  Service: Pain Management     EPIDURAL BLOCK INJECTION N/A 12/28/2021    Procedure: BLOCK / INJECTION EPIDURAL STEROID LUMBAR L4-L5;  Surgeon: Yaneth Bhatt MD;  Location: [facility];  Service: Pain Management     HYSTERECTOMY      age 26   [address] Bilateral 2/15/2022    Procedure: BLOCK MEDIAL BRANCH L3, L4, L5 #1;  Surgeon: Yaneth Bhatt MD;  Location: [facility];  Service: Pain Management     TOOTH EXTRACTION         Family History   Problem Relation Age of Onset    Alzheimer's disease Mother     Lung cancer Mother     Heart disease Father         cardiac disorder    Prostate cancer Father     Breast cancer Sister     No Known Problems Daughter     Tongue cancer Maternal Grandmother     Lung cancer Maternal Grandfather     No Known Problems Paternal Grandmother     Lung cancer Paternal Grandfather     Emphysema Maternal Aunt     No Known Problems Sister     No Known Problems Sister     Breast cancer Cousin 39       Social History     Occupational History    Not on file   Tobacco Use    Smoking status: Never Smoker    Smokeless tobacco: Never Used   Vaping Use    Vaping Use: Never used   Substance and Sexual Activity    Alcohol use: Yes     Comment: social drinker    Drug use: No    Sexual activity: Yes     Partners: Male     Birth control/protection: None     Comment: hysterectomy       Current Outpatient Medications on File Prior to Visit   Medication Sig    Calcium-Vitamin D-Vitamin K (VIACTIV PO) Take by mouth daily    cetirizine (ZyrTEC) 10 MG chewable tablet Chew 10 mg daily    Cholecalciferol (RA VITAMIN D-3) 2000 units CAPS Take by mouth daily    desvenlafaxine succinate (PRISTIQ) 50 mg 24 hr tablet TAKE 1 TABLET DAILY    ibuprofen (MOTRIN) 600 mg tablet Take 1 tablet (600 mg total) by mouth every 12 (twelve) hours as needed for moderate pain    metoprolol succinate (TOPROL-XL) 50 mg 24 hr tablet Take 50 mg by mouth daily    Multiple Vitamin (MULTIVITAMIN) tablet Take 1 tablet by mouth daily    pantoprazole (PROTONIX) 40 mg tablet Take 1 tablet (40 mg total) by mouth daily    rosuvastatin (CRESTOR) 10 MG tablet TAKE 1 TABLET DAILY    spironolactone (ALDACTONE) 25 mg tablet Take 1 tablet (25 mg total) by mouth daily    Zoster Vac Recomb Adjuvanted (Shingrix) 50 MCG/0 5ML SUSR 0 5mL IM for one dose, followed by 0 5mL IM 2-6 months after first dose    [DISCONTINUED] acetaminophen (Tylenol) 325 mg tablet Take 650 mg by mouth every 6 (six) hours as needed for mild pain    [DISCONTINUED] gabapentin (NEURONTIN) 300 mg capsule Take 1 capsule (300 mg total) by mouth 3 (three) times a day     No current facility-administered medications on file prior to visit  Allergies   Allergen Reactions    Codeine Other (See Comments)     "throat swells"         Physical Exam    /88   Pulse 89   Temp 98 °F (36 7 °C)   Resp 20   Ht 5' 7" (1 702 m)   Wt 92 1 kg (203 lb)   BMI 31 79 kg/m²     Constitutional: normal, well developed, well nourished, alert, in no distress and non-toxic and no overt pain behavior  and overweight  Eyes: anicteric  HEENT: grossly intact  Neck: supple, symmetric, trachea midline and no masses   Pulmonary:even and unlabored  Cardiovascular:No edema or pitting edema present  Skin:Normal without rashes or lesions and well hydrated  Psychiatric:Mood and affect appropriate  Neurologic:Cranial Nerves II-XII grossly intact Sensation grossly intact; no clonus negative bethea's  Reflexes 2+ and brisk  SLR weakly positive right sided  Musculoskeletal:normal gait  Mild weakness with right sided hip flexion, knee extension and right ehl extension  Otherwise 5/5 strength in all other active range of motion movements b/l   Normal heel toe and tip toe walking  Significant pain with lumbar facet loading bilaterally and with lateral spine rotation  ttp over lumbar paraspinal muscles  Negative deo's test, negative gaenslen's negative SIJ loading bilaterally  Imaging    MRI LUMBAR SPINE WITHOUT CONTRAST     INDICATION: [id number]: Radiculopathy, lumbar region  8 weeks of right-sided lumbar radiculopathy  Acute intractable low back pain  Status post 6 weeks of physical therapy  Leg weakness  Low back pain      COMPARISON:  8/17/2021     TECHNIQUE:  Sagittal T1, sagittal T2, sagittal inversion recovery, axial T1 and axial T2, coronal T2     IMAGE QUALITY:  Diagnostic     FINDINGS:     VERTEBRAL BODIES:  There are 5 lumbar type vertebral bodies  Normal alignment of the lumbar spine  No spondylolysis or spondylolisthesis  No scoliosis  No compression fracture  Scattered degenerative endplate changes  No focally suspicious marrow   lesions  No bone marrow edema or compression abnormality  Type II Modic endplate changes R60-P0  Hemangioma T12 vertebra  Small hemangioma centrally in the L4 vertebra      SACRUM:  Normal signal within the sacrum  No evidence of insufficiency or stress fracture      DISTAL CORD AND CONUS:  Normal size and signal within the distal cord and conus  The conus medullaris terminates at the superior endplate of L2      PARASPINAL SOFT TISSUES:  Paraspinal soft tissues are unremarkable      LOWER THORACIC DISC SPACES:  Mild noncompressive lower thoracic degenerative change      LUMBAR DISC SPACES:     L1-L2:  Normal      L2-L3:  Normal      L3-L4:  There is a left neural foraminal disc herniation, protrusion type  Mild left neural foraminal narrowing  Central canal and right neural foramen patent      L4-L5:  There is a diffuse disk bulge  No significant central canal or neural foraminal narrowing  Bilateral facet hypertrophy noted      L5-S1:  There is a diffuse disk bulge    No significant central canal or neural foraminal narrowing  Bilateral facet hypertrophy noted      IMPRESSION:     Mild noncompressive lumbar degenerative change

## 2022-02-28 NOTE — PROGRESS NOTES
Assessment  1  Anxiety  -     ALPRAZolam (XANAX) 0 5 mg tablet; Take 1 tablet (0 5 mg total) by mouth 30 min pre-procedure    2  Lumbar spondylosis - Bilateral  -     Case request operating room: BLOCK MEDIAL BRANCH L3, L4, L5 #2; Standing  -     Case request operating room: BLOCK MEDIAL BRANCH L3, L4, L5 #2    3  Lumbar facet arthropathy  -     Case request operating room: BLOCK MEDIAL BRANCH L3, L4, L5 #2; Standing  -     Case request operating room: BLOCK MEDIAL BRANCH L3, L4, L5 #2    4  Lumbar radiculopathy  -     gabapentin (NEURONTIN) 300 mg capsule; Take 1 capsule (300 mg total) by mouth 3 (three) times a day    Greater than 90% relief of pain with improved ability to participate with IADLs after bilateral L3, L4, L5 medial branch blocks #1  Prior to this described axial low back pain described primarily arthritic features  Positive facet loading maneuvers consistent with prominent lumbar facet arthropathy noted on MRI lumbar spine  Risks benefits and alternatives to lumbar medial branch blocks and subsequent radiofrequency ablation of successful thoroughly discussed with patient  Handouts provided and questions answered to satisfaction  Previously reported the following symptomatology:     MRI findings show mild noncompressive lumbar degenerative Disc disease with lumbar facet arthropathy noted at lower lumbar levels  Mild weakness with right hip flexion, knee extension and right ankle dorsiflexion  Additionally has pain with bilateral facet loading maneuvers and lumbar spine as well as with axial rotation  Reports symptomatology of lumbar radiculopathy in right L4 and L5 dermatomal distributions accompanied by weakness numbness and paresthesias at times  Physical therapy has improved her symptoms to a moderate extent  Reasonable to proceed with right L4 and L5 TFESI to target radicular pain at this time as part of multimodal pain therapy plan    If symptomatology lumbar facet arthropathy are more prevalent, we may proceed with medial branch blocks  All interventions including epidural steroid injections, medial branch blocks and subsequent radiofrequency ablation if successful were thoroughly discussed with patient  Handouts provided and questions answered to patient's satisfaction  Plan  -bilateral L3, L4, L5 medial blocks #2; f/u 2 weeks post procedure  -gabapentin increased to 300 mg t i d  Ordered for patient; counseled regarding sedative effects of taking this medication and provided up titration calendar  Counseled not to take medication while driving or operating heavy machinery/using stairs  -  Ibuprofen 600 mg t i d  prn pain previously Prescribed  Patient educated regarding bleeding risk of taking this medication not taking any other nonsteroidal anti-inflammatory medications while taking this medication; counseled thoroughly regarding potential risk of Cardiovascular injury, Kidney injury, Gastrointestinal ulceration/bleeding  Patient voiced understanding; gi ppx additionally written  -physical therapy for right-sided lumbar radiculopathy, facet arthropathy; Core exercises additionally provided for physician directed home PT that the patient plans to participate with for 1 hour, twice a week for the next 6 weeks  There are risks associated with opioid medications, including dependence, addiction and tolerance  The patient understands and agrees to use these medications only as prescribed  Potential side effects of the medications include, but are not limited to, constipation, drowsiness, addiction, impaired judgment and risk of fatal overdose if not taken as prescribed  The patient was warned against driving while taking sedation medications  Sharing medications is a felony  At this point in time, the patient is showing no signs of addiction, abuse, diversion or suicidal ideation      [place] Prescription Drug Monitoring Program report was reviewed and was appropriate Complete risks and benefits including bleeding, infection, tissue reaction, nerve injury and allergic reaction were discussed  The approach was demonstrated using models and literature was provided  Verbal and written consent was obtained  My impressions and treatment recommendations were discussed in detail with the patient who verbalized understanding and had no further questions  Discharge instructions were provided  I personally saw and examined the patient and I agree with the above discussed plan of care  New Medications Ordered This Visit   Medications    ALPRAZolam (XANAX) 0 5 mg tablet     Sig: Take 1 tablet (0 5 mg total) by mouth 30 min pre-procedure     Dispense:  4 tablet     Refill:  0    gabapentin (NEURONTIN) 300 mg capsule     Sig: Take 1 capsule (300 mg total) by mouth 3 (three) times a day     Dispense:  270 capsule     Refill:  0       History of Present Illness    Greater than 90% relief of pain with improved ability to participate with IADLs after bilateral L3, L4, L5 medial branch blocks #1  Prior to this described axial low back pain described primarily arthritic features  Positive facet loading maneuvers consistent with prominent lumbar facet arthropathy noted on MRI lumbar spine  Risks benefits and alternatives to lumbar medial branch blocks and subsequent radiofrequency ablation of successful thoroughly discussed with patient  Handouts provided and questions answered to satisfaction  Previously reported the following symptomatology:     Art Connolly is a 46 y o  female  With past medical history of hypercholesterolemia presenting with 12 week history of lumbar radicular pain in right L4 and L5 dermatomal distribution accompanied by weakness numbness and paresthesias  The patient rates the pain at a 8/10 accompanied by electric shock-like shooting features and crampy burning pain in the aforementioned dermatomal distributions    She additionally notes symptomatology of aching, nagging, indolent, stabbing, throbbing features of the pain in her back, symptomatic of arthritic like low back pain  The pain is worse in the mornings as well as the end of the day; exertion such as walking for long periods of time seems to exacerbate the pain  The patient can hardly walk more than a few blocks without having debilitating pain  She tries to maintain an active lifestyle and finds that the current degree of pain seems to compromises her efforts  The pain significantly impacts independent activities of daily living and contributes to significant disability  She has attempted a 1 month course of physical therapy with moderate relief of the pain  She has taken ibuprofen as well as flexeril with limited relief of the pain as well  She has never tried epidural steroid injections in the past  She denies any bowel or bladder dysfunction/incontinence    I have personally reviewed and/or updated the patient's past medical history, past surgical history, family history, social history, current medications, allergies, and vital signs today  Review of Systems   Constitutional: Positive for activity change  HENT: Negative  Eyes: Negative  Respiratory: Negative  Cardiovascular: Negative  Gastrointestinal: Negative  Endocrine: Negative  Genitourinary: Negative  Musculoskeletal: Positive for arthralgias, back pain, gait problem and myalgias  Skin: Negative  Allergic/Immunologic: Negative  Neurological: Positive for weakness and numbness  Hematological: Negative  Psychiatric/Behavioral: Negative  All other systems reviewed and are negative        Patient Active Problem List   Diagnosis    Mixed hyperlipidemia    Hypertension    Anxiety    Vitamin D deficiency    Lumbar facet arthropathy    Lumbar radiculopathy       Past Medical History:   Diagnosis Date    Hyperlipidemia     Hypertension        Past Surgical History:   Procedure Laterality Date    BREAST BIOPSY Left 2009    core bx; benign     CHOLECYSTECTOMY  1995    EPIDURAL BLOCK INJECTION Right 12/2/2021    Procedure: L4 and L5 TRANSFORAMINAL EPIDURAL STEROID INJECTION;  Surgeon: Manoj Michelle MD;  [facility]: [facility];  Service: Pain Management     EPIDURAL BLOCK INJECTION N/A 12/28/2021    Procedure: BLOCK / INJECTION EPIDURAL STEROID LUMBAR L4-L5;  Surgeon: Manoj Michelle MD;  Location: [facility];  Service: Pain Management     HYSTERECTOMY      age 26   [address] Bilateral 2/15/2022    Procedure: BLOCK MEDIAL BRANCH L3, L4, L5 #1;  Surgeon: Manoj Michelle MD;  Location: [facility];  Service: Pain Management     TOOTH EXTRACTION         Family History   Problem Relation Age of Onset    Alzheimer's disease Mother     Lung cancer Mother     Heart disease Father         cardiac disorder    Prostate cancer Father     Breast cancer Sister     No Known Problems Daughter     Tongue cancer Maternal Grandmother     Lung cancer Maternal Grandfather     No Known Problems Paternal Grandmother     Lung cancer Paternal Grandfather     Emphysema Maternal Aunt     No Known Problems Sister     No Known Problems Sister     Breast cancer Cousin 39       Social History     Occupational History    Not on file   Tobacco Use    Smoking status: Never Smoker    Smokeless tobacco: Never Used   Vaping Use    Vaping Use: Never used   Substance and Sexual Activity    Alcohol use: Yes     Comment: social drinker    Drug use: No    Sexual activity: Yes     Partners: Male     Birth control/protection: None     Comment: hysterectomy       Current Outpatient Medications on File Prior to Visit   Medication Sig    Calcium-Vitamin D-Vitamin K (VIACTIV PO) Take by mouth daily    cetirizine (ZyrTEC) 10 MG chewable tablet Chew 10 mg daily    Cholecalciferol (RA VITAMIN D-3) 2000 units CAPS Take by mouth daily    desvenlafaxine succinate (PRISTIQ) 50 mg 24 hr tablet TAKE 1 TABLET DAILY    ibuprofen (MOTRIN) 600 mg tablet Take 1 tablet (600 mg total) by mouth every 12 (twelve) hours as needed for moderate pain    metoprolol succinate (TOPROL-XL) 50 mg 24 hr tablet Take 50 mg by mouth daily    Multiple Vitamin (MULTIVITAMIN) tablet Take 1 tablet by mouth daily    pantoprazole (PROTONIX) 40 mg tablet Take 1 tablet (40 mg total) by mouth daily    rosuvastatin (CRESTOR) 10 MG tablet TAKE 1 TABLET DAILY    spironolactone (ALDACTONE) 25 mg tablet Take 1 tablet (25 mg total) by mouth daily    Zoster Vac Recomb Adjuvanted (Shingrix) 50 MCG/0 5ML SUSR 0 5mL IM for one dose, followed by 0 5mL IM 2-6 months after first dose    [DISCONTINUED] acetaminophen (Tylenol) 325 mg tablet Take 650 mg by mouth every 6 (six) hours as needed for mild pain    [DISCONTINUED] gabapentin (NEURONTIN) 300 mg capsule Take 1 capsule (300 mg total) by mouth 3 (three) times a day     No current facility-administered medications on file prior to visit  Allergies   Allergen Reactions    Codeine Other (See Comments)     "throat swells"         Physical Exam    /88   Pulse 89   Temp 98 °F (36 7 °C)   Resp 20   Ht 5' 7" (1 702 m)   Wt 92 1 kg (203 lb)   BMI 31 79 kg/m²     Constitutional: normal, well developed, well nourished, alert, in no distress and non-toxic and no overt pain behavior  and overweight  Eyes: anicteric  HEENT: grossly intact  Neck: supple, symmetric, trachea midline and no masses   Pulmonary:even and unlabored  Cardiovascular:No edema or pitting edema present  Skin:Normal without rashes or lesions and well hydrated  Psychiatric:Mood and affect appropriate  Neurologic:Cranial Nerves II-XII grossly intact Sensation grossly intact; no clonus negative bethea's  Reflexes 2+ and brisk  SLR weakly positive right sided  Musculoskeletal:normal gait  Mild weakness with right sided hip flexion, knee extension and right ehl extension  Otherwise 5/5 strength in all other active range of motion movements b/l   Normal heel toe and tip toe walking  Significant pain with lumbar facet loading bilaterally and with lateral spine rotation  ttp over lumbar paraspinal muscles  Negative deo's test, negative gaenslen's negative SIJ loading bilaterally  Imaging    MRI LUMBAR SPINE WITHOUT CONTRAST     INDICATION: M54 16: Radiculopathy, lumbar region  8 weeks of right-sided lumbar radiculopathy  Acute intractable low back pain  Status post 6 weeks of physical therapy  Leg weakness  Low back pain      COMPARISON:  8/17/2021     TECHNIQUE:  Sagittal T1, sagittal T2, sagittal inversion recovery, axial T1 and axial T2, coronal T2     IMAGE QUALITY:  Diagnostic     FINDINGS:     VERTEBRAL BODIES:  There are 5 lumbar type vertebral bodies  Normal alignment of the lumbar spine  No spondylolysis or spondylolisthesis  No scoliosis  No compression fracture  Scattered degenerative endplate changes  No focally suspicious marrow   lesions  No bone marrow edema or compression abnormality  Type II Modic endplate changes K77-L9  Hemangioma T12 vertebra  Small hemangioma centrally in the L4 vertebra      SACRUM:  Normal signal within the sacrum  No evidence of insufficiency or stress fracture      DISTAL CORD AND CONUS:  Normal size and signal within the distal cord and conus  The conus medullaris terminates at the superior endplate of L2      PARASPINAL SOFT TISSUES:  Paraspinal soft tissues are unremarkable      LOWER THORACIC DISC SPACES:  Mild noncompressive lower thoracic degenerative change      LUMBAR DISC SPACES:     L1-L2:  Normal      L2-L3:  Normal      L3-L4:  There is a left neural foraminal disc herniation, protrusion type  Mild left neural foraminal narrowing  Central canal and right neural foramen patent      L4-L5:  There is a diffuse disk bulge  No significant central canal or neural foraminal narrowing  Bilateral facet hypertrophy noted      L5-S1:  There is a diffuse disk bulge    No significant central canal or neural foraminal narrowing  Bilateral facet hypertrophy noted      IMPRESSION:     Mild noncompressive lumbar degenerative change

## 2022-03-01 ENCOUNTER — ANNUAL EXAM (OUTPATIENT)
Dept: GYNECOLOGY | Facility: CLINIC | Age: 52
End: 2022-03-01
Payer: COMMERCIAL

## 2022-03-01 VITALS
HEIGHT: 69 IN | BODY MASS INDEX: 30.07 KG/M2 | SYSTOLIC BLOOD PRESSURE: 148 MMHG | DIASTOLIC BLOOD PRESSURE: 84 MMHG | HEART RATE: 98 BPM | WEIGHT: 203 LBS

## 2022-03-01 DIAGNOSIS — Z01.419 ENCOUNTER FOR GYNECOLOGICAL EXAMINATION (GENERAL) (ROUTINE) WITHOUT ABNORMAL FINDINGS: Primary | ICD-10-CM

## 2022-03-01 PROCEDURE — 99396 PREV VISIT EST AGE 40-64: CPT | Performed by: OBSTETRICS & GYNECOLOGY

## 2022-03-01 PROCEDURE — 1036F TOBACCO NON-USER: CPT | Performed by: OBSTETRICS & GYNECOLOGY

## 2022-03-01 NOTE — PROGRESS NOTES
Assessment/Plan:    Recommended monthly SBE, annual CBE and annual screening mammo  ASCCP guidelines reviewed and pap with cotesting not done today  Patient would like it done next year  Cologuard ordered  Reviewed diet/activity recommendations Calcium 4900-5962 mg and Vit D 600-1000 IU daily  Discussed postmenopausal considerations and symptoms to report  Kegel exercises as instructed  RTO in one year for routine annual gyn exam or sooner PRN  Diagnoses and all orders for this visit:    Encounter for gynecological examination (general) (routine) without abnormal findings        Subjective:      Patient ID: Janna Bland is a 46 y o  female  This patient presents for routine annual gyn exam    VM sx managed well with Estro[facility]  Hx of hyst for AUB, patient states she was told pathology was precancerous and was advised to continue paps  She is agreeable to paps every other year  She denies vaginal bleeding or spotting,  pelvic pain, dyspareunia, breast concerns, abnormal discharge, bowel/bladder dysfunction, depression/anx  , sexually active and is monogamous  Pap/HPV up to date and normal, 1/19/21  Mammography up to date, 4/27/21  Colonoscopy not done to date secondary to COVID  Cologuard ordered by PCP  The following portions of the patient's history were reviewed and updated as appropriate: allergies, current medications, past family history, past medical history, past social history, past surgical history and problem list     Review of Systems   Constitutional: Negative  HENT: Negative  Respiratory: Negative  Cardiovascular: Negative  Gastrointestinal: Negative  Endocrine: Negative  Genitourinary: Negative for difficulty urinating, dyspareunia, dysuria, frequency, menstrual problem, pelvic pain, urgency, vaginal bleeding, vaginal discharge and vaginal pain  Musculoskeletal: Negative  Skin: Negative  Neurological: Negative  Psychiatric/Behavioral: Negative  Objective:      /84   Pulse 98   Ht 5' 8 5" (1 74 m)   Wt 92 1 kg (203 lb)   BMI 30 42 kg/m²          Physical Exam  Vitals and nursing note reviewed  Exam conducted with a chaperone present  Constitutional:       Appearance: Normal appearance  She is well-developed  HENT:      Head: Normocephalic and atraumatic  Neck:      Thyroid: No thyroid mass or thyromegaly  Cardiovascular:      Rate and Rhythm: Normal rate and regular rhythm  Heart sounds: Normal heart sounds  Pulmonary:      Effort: Pulmonary effort is normal       Breath sounds: Normal breath sounds  Chest:   Breasts: Breasts are symmetrical       Right: No inverted nipple, mass, nipple discharge, skin change or tenderness  Left: No inverted nipple, mass, nipple discharge, skin change or tenderness  Abdominal:      General: Bowel sounds are normal       Palpations: Abdomen is soft  Tenderness: There is no abdominal tenderness  Hernia: There is no hernia in the left inguinal area or right inguinal area  Genitourinary:     General: Normal vulva  Exam position: Supine  Pubic Area: No rash  Labia:         Right: No rash, tenderness, lesion or injury  Left: No rash, tenderness, lesion or injury  Urethra: No prolapse, urethral pain, urethral swelling or urethral lesion  Vagina: Normal  No signs of injury and foreign body  No vaginal discharge, erythema, tenderness, bleeding, lesions or prolapsed vaginal walls  Adnexa:         Right: No mass, tenderness or fullness  Left: No mass, tenderness or fullness  Rectum: No external hemorrhoid  Comments: Urethra normal without lesions  No bladder tenderness  Cervix, uterus absent, no masses or tenderness on BME  Musculoskeletal:         General: Normal range of motion  Cervical back: Normal range of motion and neck supple  Lymphadenopathy:      Lower Body: No right inguinal adenopathy   No left inguinal adenopathy  Skin:     General: Skin is warm and dry  Neurological:      Mental Status: She is alert and oriented to person, place, and time  Psychiatric:         Speech: Speech normal          Behavior: Behavior normal  Behavior is cooperative

## 2022-03-02 DIAGNOSIS — F41.8 ANXIETY ASSOCIATED WITH DEPRESSION: ICD-10-CM

## 2022-03-02 RX ORDER — ALPRAZOLAM 0.5 MG/1
0.5 TABLET ORAL ONCE
Status: CANCELLED | OUTPATIENT
Start: 2022-03-02 | End: 2022-03-02

## 2022-03-02 RX ORDER — DESVENLAFAXINE 50 MG/1
50 TABLET, EXTENDED RELEASE ORAL DAILY
Qty: 90 TABLET | Refills: 3 | Status: SHIPPED | OUTPATIENT
Start: 2022-03-02

## 2022-03-03 ENCOUNTER — HOSPITAL ENCOUNTER (OUTPATIENT)
Facility: HOSPITAL | Age: 52
Setting detail: OUTPATIENT SURGERY
Discharge: HOME | End: 2022-03-03
Attending: ANESTHESIOLOGY | Admitting: ANESTHESIOLOGY
Payer: COMMERCIAL

## 2022-03-03 ENCOUNTER — APPOINTMENT (OUTPATIENT)
Dept: RADIOLOGY | Facility: HOSPITAL | Age: 52
End: 2022-03-03
Payer: COMMERCIAL

## 2022-03-03 VITALS
WEIGHT: 203 LBS | SYSTOLIC BLOOD PRESSURE: 140 MMHG | TEMPERATURE: 98.6 F | HEART RATE: 68 BPM | OXYGEN SATURATION: 97 % | HEIGHT: 67 IN | DIASTOLIC BLOOD PRESSURE: 67 MMHG | BODY MASS INDEX: 31.86 KG/M2 | RESPIRATION RATE: 18 BRPM

## 2022-03-03 DIAGNOSIS — R52 PAIN: ICD-10-CM

## 2022-03-03 PROCEDURE — 64493 INJ PARAVERT F JNT L/S 1 LEV: CPT | Performed by: ANESTHESIOLOGY

## 2022-03-03 PROCEDURE — 64494 INJ PARAVERT F JNT L/S 2 LEV: CPT | Performed by: ANESTHESIOLOGY

## 2022-03-03 RX ORDER — BUPIVACAINE HYDROCHLORIDE 2.5 MG/ML
INJECTION, SOLUTION EPIDURAL; INFILTRATION; INTRACAUDAL AS NEEDED
Status: DISCONTINUED | OUTPATIENT
Start: 2022-03-03 | End: 2022-03-03 | Stop reason: HOSPADM

## 2022-03-03 RX ORDER — LIDOCAINE HYDROCHLORIDE 10 MG/ML
INJECTION, SOLUTION EPIDURAL; INFILTRATION; INTRACAUDAL; PERINEURAL AS NEEDED
Status: DISCONTINUED | OUTPATIENT
Start: 2022-03-03 | End: 2022-03-03 | Stop reason: HOSPADM

## 2022-03-03 RX ORDER — METHYLPREDNISOLONE ACETATE 80 MG/ML
INJECTION, SUSPENSION INTRA-ARTICULAR; INTRALESIONAL; INTRAMUSCULAR; SOFT TISSUE AS NEEDED
Status: DISCONTINUED | OUTPATIENT
Start: 2022-03-03 | End: 2022-03-03 | Stop reason: HOSPADM

## 2022-03-03 NOTE — DISCHARGE INSTRUCTIONS
PLEASE SCHEDULE 2 WEEK FOLLOW UP BY CALLING THE [facility] [place]: [phone]      MEDIAL BRANCH BLOCK DISCHARGE INSTRUCTIONS      ACTIVITY  · Please do activities that will bring the normal pain that we are rating  For example, if vacuuming or walking increases the painm do that  Jonah twill give the most accurate response to the diary  · You may shower, but no tub baths today, or applied heat  CARE OF THE INJECTION SITE  · This area may be numb for several hours after the injection  · Notify the [facility] if you have any of the following: redness, drainage, swelling or fever above 100°F     SPECIAL INSTRUCTIONS  · Please return the MBB diary to our office by mail, fax, or drop it off  MEDICATIONS  · Please do not take any break through or short acting pain medications for 8 hours after the block  · Continue to take all routine medications  · Our office may have instructed you to hold some medications  · You may resume _______________________________________________  If you have any problems specifically related to your procedure, please call our office at [phone]  Problems not related to your procedure should be directed at your primary care physician  Lumbar Radiofrequency Ablation   WHAT YOU NEED TO KNOW:   What do I need to know about lumbar radiofrequency ablation? Lumbar radiofrequency ablation (RFA) is a procedure used to treat facet joint pain in your lower back  Facet joints are found at the back of each vertebra  A needle electrode is used to send electrical currents to the nerves in your facet joint  The electrical currents create heat that damages the nerve so it cannot send pain signals  How do I prepare for lumbar RFA? Your healthcare provider will talk to you about how to prepare for this procedure  He may tell you not to eat or drink anything after midnight on the day of your procedure   He will tell you what medicines to take or not take on the day of your procedure  What will happen during lumbar RFA? · You will lie on your stomach  You will be given local anesthesia to numb the area of your back where the needle electrode will be inserted  You may be given a sedative to help keep you relaxed  You may still feel pressure or pushing during the procedure, but you should not feel any pain  Your healthcare provider will use fluoroscopy (a type of x-ray) to guide the needle electrode to the nerves near your facet joint  · Your healthcare provider may touch the affected nerve to make sure the needle electrode is in the right place  You will feel tingling or pressure when he does this  He will then apply local anesthesia to the nerve to numb it  This will prevent you from feeling pain when he applies heat to the nerve  Your healthcare provider will then apply heat to the nerve using the needle electrode  He may need to apply heat to more than one nerve  He will remove the needle electrode and apply a bandage over the area  What are the risks of lumbar RFA? You may have pain, numbness, tingling, or burning in the area where the lumbar RFA was done  These normally go away within 6 weeks  The needle electrode may injure your spinal nerves  This may cause permanent leg weakness or nerve pain  CARE AGREEMENT:   You have the right to help plan your care  Learn about your health condition and how it may be treated  Discuss treatment options with your healthcare providers to decide what care you want to receive  You always have the right to refuse treatment  The above information is an  only  It is not intended as medical advice for individual conditions or treatments  Talk to your doctor, nurse or pharmacist before following any medical regimen to see if it is safe and effective for you    © Copyright [employer] 2022 Information is for End User's use only and may not be sold, redistributed or otherwise used for commercial purposes   All illustrations and images included in CareNotes® are the copyrighted property of A [employer] Inc  or [facility] Laurie Hogan

## 2022-03-03 NOTE — OP NOTE
OPERATIVE REPORT  PATIENT NAME: Mesfin Chong    :  1970  MRN: [id number]  Pt Location:  GI ROOM 01    SURGERY DATE: 3/3/2022    Surgeon(s) and Role: Sunny Claros MD - Primary    Preop Diagnosis:  Lumbar spondylosis [M47 816]  Lumbar facet arthropathy [M47 816]    Post-Op Diagnosis Codes:     * Lumbar spondylosis [M47 816]     * Lumbar facet arthropathy [M47 816]    Procedure(s) (LRB):  BLOCK MEDIAL BRANCH L3, L4, L5 #2 (Bilateral)    Specimen(s):  * No specimens in log *    Estimated Blood Loss:   Minimal    Drains:  * No LDAs found *    Anesthesia Type:   Local    Operative Indications:  Lumbar spondylosis [M47 816]  Lumbar facet arthropathy [M47 816]    Operative Findings:  Bilateral L3, L4, L5 medial branch nerve regions identified under fluoroscopic guidance       Complications:   None    Procedure and Technique:  Please see detailed procedure note     I was present for the entire procedure    Patient Disposition:  PACU       SIGNATURE: Alesha Singletary MD  DATE: March 3, 2022  TIME: 11:34 AM

## 2022-03-03 NOTE — INTERVAL H&P NOTE
H&P reviewed  After examining the patient I find no changes in the patients condition since the H&P had been written      Vitals:    03/03/22 1109   BP: 125/88   Pulse: 94   Resp: 20   Temp: 98 6 °F (37 °C)   SpO2: 96%

## 2022-03-03 NOTE — PROCEDURES
Pre-procedure Diagnosis: Lumbar Facet Arthropathy  Post-procedure Diagnosis: Lumbar Facet Arthropathy  Procedure Title(s):  [BILATERAL L3, L4, L5] medial branch nerve blocks [(CONFIRMATORY)]  Attending Surgeon:   Nevaeh Ball MD  Anesthesia:   Local     Indications: The patient is a 46y o  year-old female with a diagnosis of lumbar facet arthropathy  The patient's history and physical exam were reviewed  The risks, benefits and alternatives to the procedure were discussed, and all questions were answered to the patient's satisfaction  The patient agreed to proceed, and written informed consent was obtained  Procedure in Detail: The patient was brought into the procedure room and placed in the prone position on the fluoroscopy table  The area of the lumbar spine was prepped with chloraprep and then draped in a sterile manner  AP fluoroscopy was used to identify the [L3-L5] levels on the [LEFT] side  The C-arm was obliqued to visualize the junction of the superior articulate process and transverse process  The sacral ala was identified and marked  The skin in these identified areas was anesthetized with 1% lidocaine  A 22-gauge, 3½-inch spinal needle was advanced toward each of these points under fluoroscopic guidance  Once bone was contacted, negative aspiration was confirmed and [1-mL] of a [6mL]mixture of [5mL] [0 25% bupivicaine] and 1mL of 80mg/mL Depomedrol was injected at each level  (The same procedure was performed on the opposite side )    After the procedure was completed, the patient's back was cleaned and bandages were placed at the needle insertion sites  Disposition: The patient tolerated the procedure well, and there were no apparent complications  The patient was taken to the recovery area where written discharge instructions for the procedure were given  The patient was given a pain diary to determine if the patient's pain improves following the injection   Once the diary is returned we will consider next appropriate course of treatment      Postoperative pain relief [WAS] significant    Estimated Blood Loss: None  Specimens Obtained: N/A

## 2022-03-09 DIAGNOSIS — I10 ESSENTIAL HYPERTENSION: ICD-10-CM

## 2022-03-09 RX ORDER — SPIRONOLACTONE 25 MG/1
25 TABLET ORAL DAILY
Qty: 90 TABLET | Refills: 1 | Status: SHIPPED | OUTPATIENT
Start: 2022-03-09

## 2022-03-18 DIAGNOSIS — I10 PRIMARY HYPERTENSION: Primary | ICD-10-CM

## 2022-03-18 RX ORDER — METOPROLOL SUCCINATE 50 MG/1
50 TABLET, EXTENDED RELEASE ORAL DAILY
Qty: 30 TABLET | Refills: 1 | Status: SHIPPED | OUTPATIENT
Start: 2022-03-18 | End: 2022-07-07

## 2022-03-18 RX ORDER — METOPROLOL SUCCINATE 50 MG/1
50 TABLET, EXTENDED RELEASE ORAL DAILY
Qty: 90 TABLET | Refills: 1 | Status: SHIPPED | OUTPATIENT
Start: 2022-03-18 | End: 2022-03-30

## 2022-03-30 ENCOUNTER — OFFICE VISIT (OUTPATIENT)
Dept: PAIN MEDICINE | Facility: CLINIC | Age: 52
End: 2022-03-30
Payer: COMMERCIAL

## 2022-03-30 VITALS
TEMPERATURE: 97.5 F | WEIGHT: 200.4 LBS | HEIGHT: 67 IN | DIASTOLIC BLOOD PRESSURE: 86 MMHG | HEART RATE: 88 BPM | BODY MASS INDEX: 31.45 KG/M2 | SYSTOLIC BLOOD PRESSURE: 158 MMHG | RESPIRATION RATE: 20 BRPM

## 2022-03-30 DIAGNOSIS — M47.816 LUMBAR FACET ARTHROPATHY: Primary | ICD-10-CM

## 2022-03-30 PROCEDURE — 3008F BODY MASS INDEX DOCD: CPT | Performed by: ANESTHESIOLOGY

## 2022-03-30 PROCEDURE — 1036F TOBACCO NON-USER: CPT | Performed by: ANESTHESIOLOGY

## 2022-03-30 PROCEDURE — 99214 OFFICE O/P EST MOD 30 MIN: CPT | Performed by: ANESTHESIOLOGY

## 2022-03-30 PROCEDURE — 3008F BODY MASS INDEX DOCD: CPT | Performed by: OBSTETRICS & GYNECOLOGY

## 2022-03-30 RX ORDER — LIDOCAINE HYDROCHLORIDE 10 MG/ML
10 INJECTION, SOLUTION EPIDURAL; INFILTRATION; INTRACAUDAL; PERINEURAL ONCE
Status: CANCELLED | OUTPATIENT
Start: 2022-03-30 | End: 2022-03-30

## 2022-03-30 RX ORDER — BUPIVACAINE HCL/PF 2.5 MG/ML
5 VIAL (ML) INJECTION ONCE
Status: CANCELLED | OUTPATIENT
Start: 2022-03-30 | End: 2022-03-30

## 2022-03-30 RX ORDER — METHYLPREDNISOLONE ACETATE 80 MG/ML
80 INJECTION, SUSPENSION INTRA-ARTICULAR; INTRALESIONAL; INTRAMUSCULAR; PARENTERAL; SOFT TISSUE ONCE
Status: CANCELLED | OUTPATIENT
Start: 2022-03-30 | End: 2022-03-30

## 2022-03-30 NOTE — PROGRESS NOTES
Assessment  1  Lumbar facet arthropathy  -     Case request operating room: RHIZOTOMY LUMBAR L3, L4, L5; Standing  -     Case request operating room: RHIZOTOMY LUMBAR L3, L4, L5    Greater than 90% relief of pain with improved ability to participate with IADLs after bilateral L3, L4, L5 medial branch blocks #1 and #2  Prior to this described axial low back pain described primarily arthritic features  Positive facet loading maneuvers consistent with prominent lumbar facet arthropathy noted on MRI lumbar spine  Risks benefits and alternatives to lumbar medial branch blocks and subsequent radiofrequency ablation of successful thoroughly discussed with patient  Handouts provided and questions answered to satisfaction  Previously reported the following symptomatology:     MRI findings show mild noncompressive lumbar degenerative Disc disease with lumbar facet arthropathy noted at lower lumbar levels  Mild weakness with right hip flexion, knee extension and right ankle dorsiflexion  Additionally has pain with bilateral facet loading maneuvers and lumbar spine as well as with axial rotation  Reports symptomatology of lumbar radiculopathy in right L4 and L5 dermatomal distributions accompanied by weakness numbness and paresthesias at times  Physical therapy has improved her symptoms to a moderate extent  Reasonable to proceed with right L4 and L5 TFESI to target radicular pain at this time as part of multimodal pain therapy plan  If symptomatology lumbar facet arthropathy are more prevalent, we may proceed with medial branch blocks  All interventions including epidural steroid injections, medial branch blocks and subsequent radiofrequency ablation if successful were thoroughly discussed with patient  Handouts provided and questions answered to patient's satisfaction      Plan  -bilateral L3, L4, L5 medial branch nerve radiofrequency ablation; rtc 3 weeks post proc  -gabapentin increased to 300 mg t i d  Ordered for patient; counseled regarding sedative effects of taking this medication and provided up titration calendar  Counseled not to take medication while driving or operating heavy machinery/using stairs  -  Ibuprofen 600 mg t i d  prn pain previously Prescribed  Patient educated regarding bleeding risk of taking this medication not taking any other nonsteroidal anti-inflammatory medications while taking this medication; counseled thoroughly regarding potential risk of Cardiovascular injury, Kidney injury, Gastrointestinal ulceration/bleeding  Patient voiced understanding; gi ppx additionally written  -physical therapy for right-sided lumbar radiculopathy, facet arthropathy; Core exercises additionally provided for physician directed home PT that the patient plans to participate with for 1 hour, twice a week for the next 6 weeks  There are risks associated with opioid medications, including dependence, addiction and tolerance  The patient understands and agrees to use these medications only as prescribed  Potential side effects of the medications include, but are not limited to, constipation, drowsiness, addiction, impaired judgment and risk of fatal overdose if not taken as prescribed  The patient was warned against driving while taking sedation medications  Sharing medications is a felony  At this point in time, the patient is showing no signs of addiction, abuse, diversion or suicidal ideation  [place] Prescription Drug Monitoring Program report was reviewed and was appropriate     Complete risks and benefits including bleeding, infection, tissue reaction, nerve injury and allergic reaction were discussed  The approach was demonstrated using models and literature was provided  Verbal and written consent was obtained  My impressions and treatment recommendations were discussed in detail with the patient who verbalized understanding and had no further questions    Discharge instructions were provided  I personally saw and examined the patient and I agree with the above discussed plan of care  No orders of the defined types were placed in this encounter  History of Present Illness    Greater than 90% relief of pain with improved ability to participate with IADLs after bilateral L3, L4, L5 medial branch blocks #1 and #2  Prior to this described axial low back pain described primarily arthritic features  Positive facet loading maneuvers consistent with prominent lumbar facet arthropathy noted on MRI lumbar spine  Risks benefits and alternatives to lumbar medial branch blocks and subsequent radiofrequency ablation of successful thoroughly discussed with patient  Handouts provided and questions answered to satisfaction  Previously reported the following symptomatology:     Adonay Lang is a 46 y o  female  With past medical history of hypercholesterolemia presenting with 12 week history of lumbar radicular pain in right L4 and L5 dermatomal distribution accompanied by weakness numbness and paresthesias  The patient rates the pain at a 8/10 accompanied by electric shock-like shooting features and crampy burning pain in the aforementioned dermatomal distributions  She additionally notes symptomatology of aching, nagging, indolent, stabbing, throbbing features of the pain in her back, symptomatic of arthritic like low back pain  The pain is worse in the mornings as well as the end of the day; exertion such as walking for long periods of time seems to exacerbate the pain  The patient can hardly walk more than a few blocks without having debilitating pain  She tries to maintain an active lifestyle and finds that the current degree of pain seems to compromises her efforts  The pain significantly impacts independent activities of daily living and contributes to significant disability  She has attempted a 1 month course of physical therapy with moderate relief of the pain   She has taken ibuprofen as well as flexeril with limited relief of the pain as well  She has never tried epidural steroid injections in the past  She denies any bowel or bladder dysfunction/incontinence    I have personally reviewed and/or updated the patient's past medical history, past surgical history, family history, social history, current medications, allergies, and vital signs today  Review of Systems   Constitutional: Positive for activity change  HENT: Negative  Eyes: Negative  Respiratory: Negative  Cardiovascular: Negative  Gastrointestinal: Negative  Endocrine: Negative  Genitourinary: Negative  Musculoskeletal: Positive for arthralgias, back pain, gait problem and myalgias  Skin: Negative  Allergic/Immunologic: Negative  Neurological: Positive for weakness and numbness  Hematological: Negative  Psychiatric/Behavioral: Negative  All other systems reviewed and are negative        Patient Active Problem List   Diagnosis    Mixed hyperlipidemia    Hypertension    Anxiety    Vitamin D deficiency    Lumbar facet arthropathy    Lumbar radiculopathy       Past Medical History:   Diagnosis Date    Hyperlipidemia     Hypertension        Past Surgical History:   Procedure Laterality Date    BREAST BIOPSY Left 2009    core bx; benign     CHOLECYSTECTOMY  1995    EPIDURAL BLOCK INJECTION Right 12/2/2021    Procedure: L4 and L5 TRANSFORAMINAL EPIDURAL STEROID INJECTION;  Surgeon: Moshe Mitchell MD;  Location: [facility];  Service: Pain Management     EPIDURAL BLOCK INJECTION N/A 12/28/2021    Procedure: BLOCK / INJECTION EPIDURAL STEROID LUMBAR L4-L5;  Surgeon: Moshe Mitchell MD;  Location: [facility];  Service: Pain Management     FL GUIDED NEEDLE PLAC BX/ASP/INJ  3/3/2022    HYSTERECTOMY      age 26    NERVE BLOCK Bilateral 2/15/2022    Procedure: BLOCK MEDIAL BRANCH L3, L4, L5 #1;  Surgeon: Moshe Mitchell MD;  Location: [facility];  Service: Pain Management     NERVE BLOCK Bilateral 3/3/2022    Procedure: BLOCK MEDIAL BRANCH L3, L4, L5 #2;  Surgeon: Felicita Pérez MD;  Location: [facility];  Service: Pain Management     TOOTH EXTRACTION         Family History   Problem Relation Age of Onset    Alzheimer's disease Mother     Lung cancer Mother     Heart disease Father         cardiac disorder    Prostate cancer Father     Breast cancer Sister     No Known Problems Daughter     Tongue cancer Maternal Grandmother     Lung cancer Maternal Grandfather     No Known Problems Paternal Grandmother     Lung cancer Paternal Grandfather     Emphysema Maternal Aunt     No Known Problems Sister     No Known Problems Sister     Breast cancer Cousin 39       Social History     Occupational History    Not on file   Tobacco Use    Smoking status: Never Smoker    Smokeless tobacco: Never Used   Vaping Use    Vaping Use: Never used   Substance and Sexual Activity    Alcohol use: Yes     Comment: social drinker    Drug use: No    Sexual activity: Yes     Partners: Male     Birth control/protection: None     Comment: hysterectomy       Current Outpatient Medications on File Prior to Visit   Medication Sig    Calcium-Vitamin D-Vitamin K (VIACTIV PO) Take by mouth daily    cetirizine (ZyrTEC) 10 MG chewable tablet Chew 10 mg daily    Cholecalciferol (RA VITAMIN D-3) 2000 units CAPS Take by mouth daily    desvenlafaxine succinate (PRISTIQ) 50 mg 24 hr tablet Take 1 tablet (50 mg total) by mouth daily    gabapentin (NEURONTIN) 300 mg capsule Take 1 capsule (300 mg total) by mouth 3 (three) times a day    ibuprofen (MOTRIN) 600 mg tablet Take 1 tablet (600 mg total) by mouth every 12 (twelve) hours as needed for moderate pain    metoprolol succinate (Toprol XL) 50 mg 24 hr tablet Take 1 tablet (50 mg total) by mouth daily    Multiple Vitamin (MULTIVITAMIN) tablet Take 1 tablet by mouth daily    pantoprazole (PROTONIX) 40 mg tablet Take 1 tablet (40 mg total) by mouth daily    rosuvastatin (CRESTOR) 10 MG tablet TAKE 1 TABLET DAILY    spironolactone (ALDACTONE) 25 mg tablet Take 1 tablet (25 mg total) by mouth daily    Zoster Vac Recomb Adjuvanted (Shingrix) 50 MCG/0 5ML SUSR 0 5mL IM for one dose, followed by 0 5mL IM 2-6 months after first dose    [DISCONTINUED] ALPRAZolam (XANAX) 0 5 mg tablet Take 1 tablet (0 5 mg total) by mouth 30 min pre-procedure    [DISCONTINUED] metoprolol succinate (TOPROL-XL) 50 mg 24 hr tablet Take 1 tablet (50 mg total) by mouth daily     No current facility-administered medications on file prior to visit  Allergies   Allergen Reactions    Codeine Other (See Comments)     "throat swells"         Physical Exam    /86   Pulse 88   Temp 97 5 °F (36 4 °C)   Resp 20   Ht 5' 7" (1 702 m)   Wt 90 9 kg (200 lb 6 4 oz)   BMI 31 39 kg/m²     Constitutional: normal, well developed, well nourished, alert, in no distress and non-toxic and no overt pain behavior  and overweight  Eyes: anicteric  HEENT: grossly intact  Neck: supple, symmetric, trachea midline and no masses   Pulmonary:even and unlabored  Cardiovascular:No edema or pitting edema present  Skin:Normal without rashes or lesions and well hydrated  Psychiatric:Mood and affect appropriate  Neurologic:Cranial Nerves II-XII grossly intact Sensation grossly intact; no clonus negative bethea's  Reflexes 2+ and brisk  SLR weakly positive right sided  Musculoskeletal:normal gait  Mild weakness with right sided hip flexion, knee extension and right ehl extension  Otherwise 5/5 strength in all other active range of motion movements b/l  Normal heel toe and tip toe walking  Significant pain with lumbar facet loading bilaterally and with lateral spine rotation  ttp over lumbar paraspinal muscles  Negative deo's test, negative gaenslen's negative SIJ loading bilaterally  Imaging    MRI LUMBAR SPINE WITHOUT CONTRAST     INDICATION: [id number]: Radiculopathy, lumbar region     8 weeks of right-sided lumbar radiculopathy  Acute intractable low back pain  Status post 6 weeks of physical therapy  Leg weakness  Low back pain      COMPARISON:  8/17/2021     TECHNIQUE:  Sagittal T1, sagittal T2, sagittal inversion recovery, axial T1 and axial T2, coronal T2     IMAGE QUALITY:  Diagnostic     FINDINGS:     VERTEBRAL BODIES:  There are 5 lumbar type vertebral bodies  Normal alignment of the lumbar spine  No spondylolysis or spondylolisthesis  No scoliosis  No compression fracture  Scattered degenerative endplate changes  No focally suspicious marrow   lesions  No bone marrow edema or compression abnormality  Type II Modic endplate changes T97-S4  Hemangioma T12 vertebra  Small hemangioma centrally in the L4 vertebra      SACRUM:  Normal signal within the sacrum  No evidence of insufficiency or stress fracture      DISTAL CORD AND CONUS:  Normal size and signal within the distal cord and conus  The conus medullaris terminates at the superior endplate of L2      PARASPINAL SOFT TISSUES:  Paraspinal soft tissues are unremarkable      LOWER THORACIC DISC SPACES:  Mild noncompressive lower thoracic degenerative change      LUMBAR DISC SPACES:     L1-L2:  Normal      L2-L3:  Normal      L3-L4:  There is a left neural foraminal disc herniation, protrusion type  Mild left neural foraminal narrowing  Central canal and right neural foramen patent      L4-L5:  There is a diffuse disk bulge  No significant central canal or neural foraminal narrowing  Bilateral facet hypertrophy noted      L5-S1:  There is a diffuse disk bulge  No significant central canal or neural foraminal narrowing  Bilateral facet hypertrophy noted      IMPRESSION:     Mild noncompressive lumbar degenerative change

## 2022-03-30 NOTE — H&P (VIEW-ONLY)
Assessment  1  Lumbar facet arthropathy  -     Case request operating room: RHIZOTOMY LUMBAR L3, L4, L5; Standing  -     Case request operating room: RHIZOTOMY LUMBAR L3, L4, L5    Greater than 90% relief of pain with improved ability to participate with IADLs after bilateral L3, L4, L5 medial branch blocks #1 and #2  Prior to this described axial low back pain described primarily arthritic features  Positive facet loading maneuvers consistent with prominent lumbar facet arthropathy noted on MRI lumbar spine  Risks benefits and alternatives to lumbar medial branch blocks and subsequent radiofrequency ablation of successful thoroughly discussed with patient  Handouts provided and questions answered to satisfaction  Previously reported the following symptomatology:     MRI findings show mild noncompressive lumbar degenerative Disc disease with lumbar facet arthropathy noted at lower lumbar levels  Mild weakness with right hip flexion, knee extension and right ankle dorsiflexion  Additionally has pain with bilateral facet loading maneuvers and lumbar spine as well as with axial rotation  Reports symptomatology of lumbar radiculopathy in right L4 and L5 dermatomal distributions accompanied by weakness numbness and paresthesias at times  Physical therapy has improved her symptoms to a moderate extent  Reasonable to proceed with right L4 and L5 TFESI to target radicular pain at this time as part of multimodal pain therapy plan  If symptomatology lumbar facet arthropathy are more prevalent, we may proceed with medial branch blocks  All interventions including epidural steroid injections, medial branch blocks and subsequent radiofrequency ablation if successful were thoroughly discussed with patient  Handouts provided and questions answered to patient's satisfaction      Plan  -bilateral L3, L4, L5 medial branch nerve radiofrequency ablation; rtc 3 weeks post proc  -gabapentin increased to 300 mg t i d  Ordered for patient; counseled regarding sedative effects of taking this medication and provided up titration calendar  Counseled not to take medication while driving or operating heavy machinery/using stairs  -  Ibuprofen 600 mg t i d  prn pain previously Prescribed  Patient educated regarding bleeding risk of taking this medication not taking any other nonsteroidal anti-inflammatory medications while taking this medication; counseled thoroughly regarding potential risk of Cardiovascular injury, Kidney injury, Gastrointestinal ulceration/bleeding  Patient voiced understanding; gi ppx additionally written  -physical therapy for right-sided lumbar radiculopathy, facet arthropathy; Core exercises additionally provided for physician directed home PT that the patient plans to participate with for 1 hour, twice a week for the next 6 weeks  There are risks associated with opioid medications, including dependence, addiction and tolerance  The patient understands and agrees to use these medications only as prescribed  Potential side effects of the medications include, but are not limited to, constipation, drowsiness, addiction, impaired judgment and risk of fatal overdose if not taken as prescribed  The patient was warned against driving while taking sedation medications  Sharing medications is a felony  At this point in time, the patient is showing no signs of addiction, abuse, diversion or suicidal ideation  [place] Prescription Drug Monitoring Program report was reviewed and was appropriate     Complete risks and benefits including bleeding, infection, tissue reaction, nerve injury and allergic reaction were discussed  The approach was demonstrated using models and literature was provided  Verbal and written consent was obtained  My impressions and treatment recommendations were discussed in detail with the patient who verbalized understanding and had no further questions    Discharge instructions were provided  I personally saw and examined the patient and I agree with the above discussed plan of care  No orders of the defined types were placed in this encounter  History of Present Illness    Greater than 90% relief of pain with improved ability to participate with IADLs after bilateral L3, L4, L5 medial branch blocks #1 and #2  Prior to this described axial low back pain described primarily arthritic features  Positive facet loading maneuvers consistent with prominent lumbar facet arthropathy noted on MRI lumbar spine  Risks benefits and alternatives to lumbar medial branch blocks and subsequent radiofrequency ablation of successful thoroughly discussed with patient  Handouts provided and questions answered to satisfaction  Previously reported the following symptomatology:     Yunior Negron is a 46 y o  female  With past medical history of hypercholesterolemia presenting with 12 week history of lumbar radicular pain in right L4 and L5 dermatomal distribution accompanied by weakness numbness and paresthesias  The patient rates the pain at a 8/10 accompanied by electric shock-like shooting features and crampy burning pain in the aforementioned dermatomal distributions  She additionally notes symptomatology of aching, nagging, indolent, stabbing, throbbing features of the pain in her back, symptomatic of arthritic like low back pain  The pain is worse in the mornings as well as the end of the day; exertion such as walking for long periods of time seems to exacerbate the pain  The patient can hardly walk more than a few blocks without having debilitating pain  She tries to maintain an active lifestyle and finds that the current degree of pain seems to compromises her efforts  The pain significantly impacts independent activities of daily living and contributes to significant disability  She has attempted a 1 month course of physical therapy with moderate relief of the pain   She has taken ibuprofen as well as flexeril with limited relief of the pain as well  She has never tried epidural steroid injections in the past  She denies any bowel or bladder dysfunction/incontinence    I have personally reviewed and/or updated the patient's past medical history, past surgical history, family history, social history, current medications, allergies, and vital signs today  Review of Systems   Constitutional: Positive for activity change  HENT: Negative  Eyes: Negative  Respiratory: Negative  Cardiovascular: Negative  Gastrointestinal: Negative  Endocrine: Negative  Genitourinary: Negative  Musculoskeletal: Positive for arthralgias, back pain, gait problem and myalgias  Skin: Negative  Allergic/Immunologic: Negative  Neurological: Positive for weakness and numbness  Hematological: Negative  Psychiatric/Behavioral: Negative  All other systems reviewed and are negative        Patient Active Problem List   Diagnosis    Mixed hyperlipidemia    Hypertension    Anxiety    Vitamin D deficiency    Lumbar facet arthropathy    Lumbar radiculopathy       Past Medical History:   Diagnosis Date    Hyperlipidemia     Hypertension        Past Surgical History:   Procedure Laterality Date    BREAST BIOPSY Left 2009    core bx; benign     CHOLECYSTECTOMY  1995    EPIDURAL BLOCK INJECTION Right 12/2/2021    Procedure: L4 and L5 TRANSFORAMINAL EPIDURAL STEROID INJECTION;  Surgeon: Zain Parisi MD;  [facility]: [facility];  Service: Pain Management     EPIDURAL BLOCK INJECTION N/A 12/28/2021    Procedure: BLOCK / INJECTION EPIDURAL STEROID LUMBAR L4-L5;  Surgeon: Zain aPrisi MD;  [facility];  Service: Pain Management     FL GUIDED NEEDLE PLAC BX/ASP/INJ  3/3/2022    HYSTERECTOMY      age 26    NERVE BLOCK Bilateral 2/15/2022    Procedure: BLOCK MEDIAL BRANCH L3, L4, L5 #1;  Surgeon: Zain Parisi MD;  [facility];  Service: Pain Management     NERVE BLOCK Bilateral 3/3/2022    Procedure: BLOCK MEDIAL BRANCH L3, L4, L5 #2;  Surgeon: Jaciel Graf MD;  [facility];  Service: Pain Management     TOOTH EXTRACTION         Family History   Problem Relation Age of Onset    Alzheimer's disease Mother     Lung cancer Mother     Heart disease Father         cardiac disorder    Prostate cancer Father     Breast cancer Sister     No Known Problems Daughter     Tongue cancer Maternal Grandmother     Lung cancer Maternal Grandfather     No Known Problems Paternal Grandmother     Lung cancer Paternal Grandfather     Emphysema Maternal Aunt     No Known Problems Sister     No Known Problems Sister     Breast cancer Cousin 39       Social History     Occupational History    Not on file   Tobacco Use    Smoking status: Never Smoker    Smokeless tobacco: Never Used   Vaping Use    Vaping Use: Never used   Substance and Sexual Activity    Alcohol use: Yes     Comment: social drinker    Drug use: No    Sexual activity: Yes     Partners: Male     Birth control/protection: None     Comment: hysterectomy       Current Outpatient Medications on File Prior to Visit   Medication Sig    Calcium-Vitamin D-Vitamin K (VIACTIV PO) Take by mouth daily    cetirizine (ZyrTEC) 10 MG chewable tablet Chew 10 mg daily    Cholecalciferol (RA VITAMIN D-3) 2000 units CAPS Take by mouth daily    desvenlafaxine succinate (PRISTIQ) 50 mg 24 hr tablet Take 1 tablet (50 mg total) by mouth daily    gabapentin (NEURONTIN) 300 mg capsule Take 1 capsule (300 mg total) by mouth 3 (three) times a day    ibuprofen (MOTRIN) 600 mg tablet Take 1 tablet (600 mg total) by mouth every 12 (twelve) hours as needed for moderate pain    metoprolol succinate (Toprol XL) 50 mg 24 hr tablet Take 1 tablet (50 mg total) by mouth daily    Multiple Vitamin (MULTIVITAMIN) tablet Take 1 tablet by mouth daily    pantoprazole (PROTONIX) 40 mg tablet Take 1 tablet (40 mg total) by mouth daily    rosuvastatin (CRESTOR) 10 MG tablet TAKE 1 TABLET DAILY    spironolactone (ALDACTONE) 25 mg tablet Take 1 tablet (25 mg total) by mouth daily    Zoster Vac Recomb Adjuvanted (Shingrix) 50 MCG/0 5ML SUSR 0 5mL IM for one dose, followed by 0 5mL IM 2-6 months after first dose    [DISCONTINUED] ALPRAZolam (XANAX) 0 5 mg tablet Take 1 tablet (0 5 mg total) by mouth 30 min pre-procedure    [DISCONTINUED] metoprolol succinate (TOPROL-XL) 50 mg 24 hr tablet Take 1 tablet (50 mg total) by mouth daily     No current facility-administered medications on file prior to visit  Allergies   Allergen Reactions    Codeine Other (See Comments)     "throat swells"         Physical Exam    /86   Pulse 88   Temp 97 5 °F (36 4 °C)   Resp 20   Ht 5' 7" (1 702 m)   Wt 90 9 kg (200 lb 6 4 oz)   BMI 31 39 kg/m²     Constitutional: normal, well developed, well nourished, alert, in no distress and non-toxic and no overt pain behavior  and overweight  Eyes: anicteric  HEENT: grossly intact  Neck: supple, symmetric, trachea midline and no masses   Pulmonary:even and unlabored  Cardiovascular:No edema or pitting edema present  Skin:Normal without rashes or lesions and well hydrated  Psychiatric:Mood and affect appropriate  Neurologic:Cranial Nerves II-XII grossly intact Sensation grossly intact; no clonus negative bethea's  Reflexes 2+ and brisk  SLR weakly positive right sided  Musculoskeletal:normal gait  Mild weakness with right sided hip flexion, knee extension and right ehl extension  Otherwise 5/5 strength in all other active range of motion movements b/l  Normal heel toe and tip toe walking  Significant pain with lumbar facet loading bilaterally and with lateral spine rotation  ttp over lumbar paraspinal muscles  Negative deo's test, negative gaenslen's negative SIJ loading bilaterally  Imaging    MRI LUMBAR SPINE WITHOUT CONTRAST     INDICATION: [id number]: Radiculopathy, lumbar region     8 weeks of right-sided lumbar radiculopathy  Acute intractable low back pain  Status post 6 weeks of physical therapy  Leg weakness  Low back pain      COMPARISON:  8/17/2021     TECHNIQUE:  Sagittal T1, sagittal T2, sagittal inversion recovery, axial T1 and axial T2, coronal T2     IMAGE QUALITY:  Diagnostic     FINDINGS:     VERTEBRAL BODIES:  There are 5 lumbar type vertebral bodies  Normal alignment of the lumbar spine  No spondylolysis or spondylolisthesis  No scoliosis  No compression fracture  Scattered degenerative endplate changes  No focally suspicious marrow   lesions  No bone marrow edema or compression abnormality  Type II Modic endplate changes Q16-N3  Hemangioma T12 vertebra  Small hemangioma centrally in the L4 vertebra      SACRUM:  Normal signal within the sacrum  No evidence of insufficiency or stress fracture      DISTAL CORD AND CONUS:  Normal size and signal within the distal cord and conus  The conus medullaris terminates at the superior endplate of L2      PARASPINAL SOFT TISSUES:  Paraspinal soft tissues are unremarkable      LOWER THORACIC DISC SPACES:  Mild noncompressive lower thoracic degenerative change      LUMBAR DISC SPACES:     L1-L2:  Normal      L2-L3:  Normal      L3-L4:  There is a left neural foraminal disc herniation, protrusion type  Mild left neural foraminal narrowing  Central canal and right neural foramen patent      L4-L5:  There is a diffuse disk bulge  No significant central canal or neural foraminal narrowing  Bilateral facet hypertrophy noted      L5-S1:  There is a diffuse disk bulge  No significant central canal or neural foraminal narrowing  Bilateral facet hypertrophy noted      IMPRESSION:     Mild noncompressive lumbar degenerative change

## 2022-03-30 NOTE — PATIENT INSTRUCTIONS
Core Strengthening Exercises   WHAT YOU NEED TO KNOW:   Your core includes the muscles of your lower back, hip, pelvis, and abdomen  Core strengthening exercises help heal and strengthen these muscles  This helps prevent another injury, and keeps your pelvis, spine, and hips in the correct position  DISCHARGE INSTRUCTIONS:   Contact your healthcare provider if:   · You have sharp or worsening pain during exercise or at rest     · You have questions or concerns about your shoulder exercises  Safety tips:  Talk to your healthcare provider before you start an exercise program  A physical therapist can teach you how to do core strengthening exercises safely  · Do the exercises on a mat or firm surface  A firm surface will support your spine and prevent low back pain  Do not do these exercises on a bed  · Move slowly and smoothly  Avoid fast or jerky motions  · Stop if you feel pain  Core exercises should not be painful  Stop if you feel pain  · Breathe normally during core exercises  Do not hold your breath  This may cause an increase in blood pressure and prevent muscle strengthening  Your healthcare provider will tell you when to inhale and exhale during the exercise  · Begin all of your exercises with abdominal bracing  Abdominal bracing helps warm up your core muscles  You can also practice abdominal bracing throughout the day  Lie on your back with your knees bent and feet flat on the floor  Place your arms in a relaxed position beside your body  Tighten your abdominal muscles  Pull your belly button in and up toward your spine  Hold for 5 seconds  Relax your muscles  Repeat 10 times  Core strengthening exercises: Your healthcare provider will tell you how often to do these exercises  The provider will also tell you how many repetitions of each exercise you should do  Hold each exercise for 5 seconds or as directed  As you get stronger, increase your hold to 10 to 15 seconds   You can do some of these exercises on a stability ball, or with a weight  Ask your healthcare provider how to use a stability ball or weight for these exercises:  · Bridging:  Lie on your back with your knees bent and feet flat on the floor  Rest your arms at your side  Tighten your buttocks, and then lift your hips 1 inch off the floor  Hold for 5 seconds  When you can do this exercise without pain for 10 seconds, increase the distance you lift your hips  A good goal is to be able to lift your hips so that your shoulders, hips, and knees are in a straight line  · Dead bug:  Lie on your back with your knees bent and feet flat on the floor  Place your arms in a relaxed position beside your body  Begin with abdominal bracing  Next, raise one leg, keeping your knee bent  Hold for 5 seconds  Repeat with the other leg  When you can do this exercise without pain for 10 to 15 seconds, you may raise one straight leg and hold  Repeat with the other leg  · Quadruped:  Place your hands and knees on the floor  Keep your wrists directly below your shoulders and your knees directly below your hips  Pull your belly button in toward your spine  Do not flatten or arch your back  Tighten your abdominal muscles below your belly button  Hold for 5 seconds  When you can do this exercise without pain for 10 to 15 seconds, you may extend one arm and hold  Repeat on the other side  · Side bridge exercises:      ? Standing side bridge:  Stand next to a wall and extend one arm toward the wall  Place your palm flat on the wall with your fingers pointing upward  Begin with abdominal bracing  Next, without moving your feet, slowly bend your arm to 90 degrees  Hold for 5 seconds  Repeat on the other side  When you can do this exercise without pain for 10 to 15 seconds, you may do the bent leg side bridge on the floor  ? Bent leg side bridge:  Lie on one side with your legs, hips, and shoulders in a straight line   Prop yourself up onto your forearm so your elbow is directly below your shoulder  Bend your knees back to 90 degrees  Begin with abdominal bracing  Next, lift your hips and balance yourself on your forearm and knees  Hold for 5 seconds  Repeat on the other side  When you can do this exercise without pain for 10 to 15 seconds, you may do the straight leg side bridge on the floor  ? Straight leg side bridge:  Lie on one side with your legs, hips, and shoulders in a straight line  Prop yourself up onto your forearm so your elbow is directly below your shoulder  Begin with abdominal bracing  Lift your hips off the floor and balance yourself on your forearm and the outside of your flexed foot  Do not let your ankle bend sideways  Hold for 5 seconds  Repeat on the other side  When you can do this exercise without pain for 10 to 15 seconds, ask your healthcare provider for more advanced exercises  · Superman:  Lie on your stomach  Extend your arms forward on the floor  Tighten your abdominal muscles and lift your right hand and left leg off the floor  Hold this position  Slowly return to the starting position  Tighten your abdominal muscles and lift your left hand and right leg off the floor  Hold this position  Slowly return to the starting position  · Clam:  Lie on your side with your knees bent  Put your bottom arm under your head to keep your neck in line  Put your top hand on your hip to keep your pelvis from moving  Put your heels together, and keep them together during this exercise  Slowly raise your top knee toward the ceiling  Then lower your leg so your knees are together  Repeat this exercise 10 times  Then switch sides and do the exercise 10 times with the other leg  · Curl up:  Lie on your back with your knees bent and feet flat on the floor  Place your hands, palms down, underneath your lower back   Next, with your elbows on the floor, lift your shoulders and chest 2 to 3 inches off the floor  Keep your head in line with your shoulders  Hold this position  Slowly return to the starting position  · Straight leg raises:  Lie on your back with one leg straight  Bend the other knee and place your foot flat on the floor  Tighten your abdominal muscles  Keep your leg straight and slowly lift it straight up 6 to 12 inches off the floor  Hold this position  Lower your leg slowly  Do as many repetitions as directed on this side  Repeat with the other leg  · Plank:  Lie on your stomach  Bend your elbows and place your forearms flat on the floor  Lift your chest, stomach, and knees off the floor  Make sure your elbows are below your shoulders  Your body should be in a straight line  Do not let your hips or lower back sink to the ground  Squeeze your abdominal muscles together and hold for 15 seconds  To make this exercise harder, hold for 30 seconds or lift 1 leg at a time  · Bicycles:  Lie on your back  Bend both knees and bring them toward your chest  Your calves should be parallel to the floor  Place the palms of your hands on the back of your head  Straighten your right leg and keep it lifted 2 inches off the floor  Raise your head and shoulders off the floor and twist towards your left  Keep your head and shoulders lifted  Bend your right knee while you straighten your left leg  Keep your left leg 2 inches off the floor  Twist your head and chest towards the left leg  Continue to straighten 1 leg at a time and twist        Follow up with your doctor as directed:  Write down your questions so you remember to ask them during your visits  © Copyright [employer] 2022 Information is for End User's use only and may not be sold, redistributed or otherwise used for commercial purposes  All illustrations and images included in CareNotes® are the copyrighted property of A [employer] Inc  or [facility] Tirado   The above information is an  only   It is not intended as medical advice for individual conditions or treatments  Talk to your doctor, nurse or pharmacist before following any medical regimen to see if it is safe and effective for you  Lumbar Radiofrequency Ablation   WHAT YOU NEED TO KNOW:   What do I need to know about lumbar radiofrequency ablation? Lumbar radiofrequency ablation (RFA) is a procedure used to treat facet joint pain in your lower back  Facet joints are found at the back of each vertebra  A needle electrode is used to send electrical currents to the nerves in your facet joint  The electrical currents create heat that damages the nerve so it cannot send pain signals  How do I prepare for lumbar RFA? Your healthcare provider will talk to you about how to prepare for this procedure  He may tell you not to eat or drink anything after midnight on the day of your procedure  He will tell you what medicines to take or not take on the day of your procedure  What will happen during lumbar RFA? · You will lie on your stomach  You will be given local anesthesia to numb the area of your back where the needle electrode will be inserted  You may be given a sedative to help keep you relaxed  You may still feel pressure or pushing during the procedure, but you should not feel any pain  Your healthcare provider will use fluoroscopy (a type of x-ray) to guide the needle electrode to the nerves near your facet joint  · Your healthcare provider may touch the affected nerve to make sure the needle electrode is in the right place  You will feel tingling or pressure when he does this  He will then apply local anesthesia to the nerve to numb it  This will prevent you from feeling pain when he applies heat to the nerve  Your healthcare provider will then apply heat to the nerve using the needle electrode  He may need to apply heat to more than one nerve  He will remove the needle electrode and apply a bandage over the area  What are the risks of lumbar RFA?   You may have pain, numbness, tingling, or burning in the area where the lumbar RFA was done  These normally go away within 6 weeks  The needle electrode may injure your spinal nerves  This may cause permanent leg weakness or nerve pain  CARE AGREEMENT:   You have the right to help plan your care  Learn about your health condition and how it may be treated  Discuss treatment options with your healthcare providers to decide what care you want to receive  You always have the right to refuse treatment  The above information is an  only  It is not intended as medical advice for individual conditions or treatments  Talk to your doctor, nurse or pharmacist before following any medical regimen to see if it is safe and effective for you  © Copyright [employer] 2022 Information is for End User's use only and may not be sold, redistributed or otherwise used for commercial purposes   All illustrations and images included in CareNotes® are the copyrighted property of [employer]  or [address] [place] [address] [employer]

## 2022-04-04 ENCOUNTER — TELEPHONE (OUTPATIENT)
Dept: FAMILY MEDICINE CLINIC | Facility: CLINIC | Age: 52
End: 2022-04-04

## 2022-04-04 NOTE — TELEPHONE ENCOUNTER
She is itching without a rash, it started on her neck, than her legs and ankles  Now the itch is all over, even her face    She uses hypo allergenic soap, and detergent  The only thing new is the water pill, put its been awhile

## 2022-04-08 DIAGNOSIS — L50.9 URTICARIA: Primary | ICD-10-CM

## 2022-04-08 RX ORDER — METHYLPREDNISOLONE 4 MG/1
TABLET ORAL
Qty: 21 EACH | Refills: 0 | Status: SHIPPED | OUTPATIENT
Start: 2022-04-08 | End: 2022-04-29

## 2022-04-08 NOTE — TELEPHONE ENCOUNTER
Stopped water pill - NO improvement in itching    Still itching - gets worse the warmer she gets    Not sure what you would recommend now & would like to restart water pill

## 2022-04-08 NOTE — TELEPHONE ENCOUNTER
Called Pt with message - She is not sure if she can take the steroids because she is scheduled for some time of ablation on the 14th - She will call provider to see if she would be able to take it

## 2022-04-14 ENCOUNTER — HOSPITAL ENCOUNTER (OUTPATIENT)
Facility: HOSPITAL | Age: 52
Setting detail: OUTPATIENT SURGERY
Discharge: HOME | End: 2022-04-14
Attending: ANESTHESIOLOGY | Admitting: ANESTHESIOLOGY
Payer: COMMERCIAL

## 2022-04-14 ENCOUNTER — APPOINTMENT (OUTPATIENT)
Dept: RADIOLOGY | Facility: HOSPITAL | Age: 52
End: 2022-04-14
Payer: COMMERCIAL

## 2022-04-14 ENCOUNTER — TELEPHONE (OUTPATIENT)
Dept: RADIOLOGY | Facility: CLINIC | Age: 52
End: 2022-04-14

## 2022-04-14 VITALS
SYSTOLIC BLOOD PRESSURE: 132 MMHG | HEIGHT: 67 IN | OXYGEN SATURATION: 99 % | HEART RATE: 87 BPM | WEIGHT: 200 LBS | RESPIRATION RATE: 18 BRPM | TEMPERATURE: 98.2 F | BODY MASS INDEX: 31.39 KG/M2 | DIASTOLIC BLOOD PRESSURE: 78 MMHG

## 2022-04-14 DIAGNOSIS — K21.9 GASTROESOPHAGEAL REFLUX DISEASE WITHOUT ESOPHAGITIS: ICD-10-CM

## 2022-04-14 PROCEDURE — 64636 DESTROY L/S FACET JNT ADDL: CPT | Performed by: ANESTHESIOLOGY

## 2022-04-14 PROCEDURE — 64635 DESTROY LUMB/SAC FACET JNT: CPT | Performed by: ANESTHESIOLOGY

## 2022-04-14 RX ORDER — LIDOCAINE HYDROCHLORIDE 10 MG/ML
INJECTION, SOLUTION EPIDURAL; INFILTRATION; INTRACAUDAL; PERINEURAL AS NEEDED
Status: DISCONTINUED | OUTPATIENT
Start: 2022-04-14 | End: 2022-04-14 | Stop reason: HOSPADM

## 2022-04-14 RX ORDER — ALPRAZOLAM 0.5 MG/1
0.5 TABLET ORAL ONCE
Status: COMPLETED | OUTPATIENT
Start: 2022-04-14 | End: 2022-04-14

## 2022-04-14 RX ORDER — LIDOCAINE HYDROCHLORIDE 20 MG/ML
INJECTION, SOLUTION EPIDURAL; INFILTRATION; INTRACAUDAL; PERINEURAL AS NEEDED
Status: DISCONTINUED | OUTPATIENT
Start: 2022-04-14 | End: 2022-04-14 | Stop reason: HOSPADM

## 2022-04-14 RX ORDER — METHYLPREDNISOLONE ACETATE 80 MG/ML
INJECTION, SUSPENSION INTRA-ARTICULAR; INTRALESIONAL; INTRAMUSCULAR; SOFT TISSUE AS NEEDED
Status: DISCONTINUED | OUTPATIENT
Start: 2022-04-14 | End: 2022-04-14 | Stop reason: HOSPADM

## 2022-04-14 RX ORDER — BUPIVACAINE HYDROCHLORIDE 2.5 MG/ML
INJECTION, SOLUTION EPIDURAL; INFILTRATION; INTRACAUDAL AS NEEDED
Status: DISCONTINUED | OUTPATIENT
Start: 2022-04-14 | End: 2022-04-14 | Stop reason: HOSPADM

## 2022-04-14 RX ADMIN — ALPRAZOLAM 0.5 MG: 0.5 TABLET ORAL at 09:22

## 2022-04-14 NOTE — PROCEDURES
Pre-procedure Diagnosis: Lumbar facet arthropathy  Post-procedure Diagnosis: Lumbar facet arthropathy  Operation Title(s):  1  Radiofrequency ablation of [BILATERAL] L3, L4, L5 medial branch nerves      2  Intraoperative fluoroscopy  Attending Surgeon:   Kaiden Mederos MD  Anesthesia:   Local    Indications: The patient is a 46y o  year-old female with a diagnosis of lumbar facet arthropathy  The patient's history and physical exam were reviewed  The patient has previously undergone diagnostic and confirmatory lumbar medial branch blocks  Fluoroscopy is being used for the precise placement of the needles at the lumbar medial branch nerves  The risks, benefits and alternatives to the procedure were discussed, and all questions were answered to the patient's satisfaction  The patient agreed to proceed, and written informed consent was obtained  Procedure in Detail: The patient was brought into the procedure room and placed in the prone position on the fluoroscopy table  The low back and upper buttock were prepped with chloraprep times two and draped in a sterile manner  AP fluoroscopy was used to identify the lumbar levels on the [LEFT] side  The fluoroscope beam was then obliqued to better visualize the junction of the superior articular process and transverse process on the [LEFT] side and then tilted caudally about 25 degrees  An 18-gauge, 100mm length, 10mm curved active tip radiofrequency probe was advanced toward the targeted points until bone was contacted  Multiple fluoroscopic views were made to ensure placement of the needle tip at the appropriate location of the medial branch nerve  Motor stimulation was performed at 2 Hz and 1 2 volts generating a twitch in the paraspinal muscles with no motor activity in the lower extremities  Next, AP fluoroscopy was used to identify the L5-S1 level  The fluoroscope been was tilted cephalad to visualize the sacral ala   The fluoroscope beam was then tilted about 45 degrees from that point and the skin and subcutaneous tissues in these identified areas were anesthetized with 1% lidocaine  An 18-gauge, 100mm length, 10mm curved active tip radiofrequency probe was advanced toward the targeted points until bone was contacted  Motor stimulation was performed at 2 Hz and 1 2 volts generating a twitch in the paraspinal muscles with no motor activity in the lower extremities  0 5ml of 2% lidocaine was injected prior to lesioning, which was performed for 90 seconds at 80 degrees centigrade  The lesion was repeated once more after slight repositioning of the needles on an oblique view  Once the lesions were complete, 1ml of a solution consisting of 5mL 0 25% bupivacaine and 1 mL Depo-medrol (80mg/mL) was injected through each needle and then removed with a 1% lidocaine flush  The patient's back was cleaned, and bandages were placed at the needle insertion site  The same procedure was repeated at the lumbar levels on the [RIGHT] side    Disposition: The patient tolerated the procedure well and there were no apparent complications  Vital signs remained stable throughout the procedure  The patient was taken to the recovery area where written discharge instructions for the procedure were given      Estimated Blood Loss: None  Specimens Obtained: N/A

## 2022-04-14 NOTE — INTERVAL H&P NOTE
H&P reviewed  After examining the patient I find no changes in the patients condition since the H&P had been written      Vitals:    04/14/22 0915   BP: 127/84   Pulse: 88   Resp: 20   Temp: 98 2 °F (36 8 °C)   SpO2: 96%

## 2022-04-14 NOTE — DISCHARGE INSTRUCTIONS
YOUR 2 WEEK FOLLOW UP HAS BEEN SCHEDULED; IF YOU WISH TO CHANGE THE FOLLOW UP, PLEASE CALL THE [facility] AT [facility]: [phone]      Lumbar Radiofrequency Ablation   WHAT YOU NEED TO KNOW:   Lumbar radiofrequency ablation (RFA) is a procedure used to treat facet joint pain in your lower back  Facet joints are found at the back of each vertebra  A needle electrode is used to send electrical currents to the nerves in your facet joint  The electrical currents create heat that damages the nerve so it cannot send pain signals  DISCHARGE INSTRUCTIONS:   Seek care immediately if:   · You cannot move your leg  · You cannot control your urine or bowel movements  · You have severe pain in your lower back  Contact your healthcare provider if:   · You have leg weakness  · You develop new symptoms  · You have questions or concerns about your condition or care  Medicines:   · Pain medicine  may be given  Ask how to take this medicine safely  · Take your medicine as directed  Contact your healthcare provider if you think your medicine is not helping or if you have side effects  Tell him or her if you are allergic to any medicine  Keep a list of the medicines, vitamins, and herbs you take  Include the amounts, and when and why you take them  Bring the list or the pill bottles to follow-up visits  Carry your medicine list with you in case of an emergency  Follow up with your healthcare provider as directed:  Write down your questions so you remember to ask them during your visits  Activity:  Do not drive a car or operate machinery within 24 hours after your procedure  Ask your healthcare provider about any other activities you should avoid  © Copyright [employer] 2022 Information is for End User's use only and may not be sold, redistributed or otherwise used for commercial purposes   All illustrations and images included in CareNotes® are the copyrighted property of [employer]  or [facility]  The above information is an  only  It is not intended as medical advice for individual conditions or treatments  Talk to your doctor, nurse or pharmacist before following any medical regimen to see if it is safe and effective for you

## 2022-04-14 NOTE — OP NOTE
OPERATIVE REPORT  PATIENT NAME: Yue Antonio    :  1970  MRN: [id number]  Pt Location:  GI ROOM 01    SURGERY DATE: 2022    Surgeon(s) and Role: Don Bowser MD - Primary    Preop Diagnosis:  Lumbar facet arthropathy [M47 816]    Post-Op Diagnosis Codes:     * Lumbar facet arthropathy [M47 816]    Procedure(s) (LRB):  RHIZOTOMY LUMBAR L3, L4, L5 (Bilateral)    Specimen(s):  * No specimens in log *    Estimated Blood Loss:   Minimal    Drains:  * No LDAs found *    Anesthesia Type:   Local    Operative Indications:  Lumbar facet arthropathy [M47 816]    Operative Findings:  Bilateral L3, L4, L5 medial branch nerve regions identified under fluoroscopic guidance  Appropriate motor testing performed prior to radiofrequency lesioning of medial branch nerves      Complications:   None    Procedure and Technique:  Please see detailed procedure note    I was present for the entire procedure    Patient Disposition:  PACU       SIGNATURE: Dennise Cheatham MD  DATE: 2022  TIME: 10:41 AM

## 2022-04-15 RX ORDER — PANTOPRAZOLE SODIUM 40 MG/1
TABLET, DELAYED RELEASE ORAL
Qty: 90 TABLET | Refills: 3 | Status: SHIPPED | OUTPATIENT
Start: 2022-04-15

## 2022-04-15 NOTE — TELEPHONE ENCOUNTER
Spoke to pt regarding status post RFA on 4/14  Pt stated that she is doing very well  Pt denies fever, denies sunburn like sensation   Writing nurse reminded pt of upcoming [address] on 4/29  Pt verbalized understanding of instructions and was very appreciative of follow up

## 2022-04-29 ENCOUNTER — OFFICE VISIT (OUTPATIENT)
Dept: PAIN MEDICINE | Facility: CLINIC | Age: 52
End: 2022-04-29
Payer: COMMERCIAL

## 2022-04-29 VITALS
DIASTOLIC BLOOD PRESSURE: 86 MMHG | TEMPERATURE: 97.5 F | HEIGHT: 67 IN | BODY MASS INDEX: 31.42 KG/M2 | RESPIRATION RATE: 20 BRPM | HEART RATE: 77 BPM | WEIGHT: 200.2 LBS | SYSTOLIC BLOOD PRESSURE: 120 MMHG

## 2022-04-29 DIAGNOSIS — M54.16 LUMBAR RADICULOPATHY: ICD-10-CM

## 2022-04-29 DIAGNOSIS — M47.816 LUMBAR FACET ARTHROPATHY: Primary | ICD-10-CM

## 2022-04-29 PROCEDURE — 3008F BODY MASS INDEX DOCD: CPT | Performed by: ANESTHESIOLOGY

## 2022-04-29 PROCEDURE — 1036F TOBACCO NON-USER: CPT | Performed by: ANESTHESIOLOGY

## 2022-04-29 PROCEDURE — 99214 OFFICE O/P EST MOD 30 MIN: CPT | Performed by: ANESTHESIOLOGY

## 2022-04-29 PROCEDURE — 3008F BODY MASS INDEX DOCD: CPT | Performed by: OBSTETRICS & GYNECOLOGY

## 2022-04-29 NOTE — PATIENT INSTRUCTIONS
Core Strengthening Exercises   WHAT YOU NEED TO KNOW:   Your core includes the muscles of your lower back, hip, pelvis, and abdomen  Core strengthening exercises help heal and strengthen these muscles  This helps prevent another injury, and keeps your pelvis, spine, and hips in the correct position  DISCHARGE INSTRUCTIONS:   Contact your healthcare provider if:   · You have sharp or worsening pain during exercise or at rest     · You have questions or concerns about your shoulder exercises  Safety tips:  Talk to your healthcare provider before you start an exercise program  A physical therapist can teach you how to do core strengthening exercises safely  · Do the exercises on a mat or firm surface  A firm surface will support your spine and prevent low back pain  Do not do these exercises on a bed  · Move slowly and smoothly  Avoid fast or jerky motions  · Stop if you feel pain  Core exercises should not be painful  Stop if you feel pain  · Breathe normally during core exercises  Do not hold your breath  This may cause an increase in blood pressure and prevent muscle strengthening  Your healthcare provider will tell you when to inhale and exhale during the exercise  · Begin all of your exercises with abdominal bracing  Abdominal bracing helps warm up your core muscles  You can also practice abdominal bracing throughout the day  Lie on your back with your knees bent and feet flat on the floor  Place your arms in a relaxed position beside your body  Tighten your abdominal muscles  Pull your belly button in and up toward your spine  Hold for 5 seconds  Relax your muscles  Repeat 10 times  Core strengthening exercises: Your healthcare provider will tell you how often to do these exercises  The provider will also tell you how many repetitions of each exercise you should do  Hold each exercise for 5 seconds or as directed  As you get stronger, increase your hold to 10 to 15 seconds   You can do some of these exercises on a stability ball, or with a weight  Ask your healthcare provider how to use a stability ball or weight for these exercises:  · Bridging:  Lie on your back with your knees bent and feet flat on the floor  Rest your arms at your side  Tighten your buttocks, and then lift your hips 1 inch off the floor  Hold for 5 seconds  When you can do this exercise without pain for 10 seconds, increase the distance you lift your hips  A good goal is to be able to lift your hips so that your shoulders, hips, and knees are in a straight line  · Dead bug:  Lie on your back with your knees bent and feet flat on the floor  Place your arms in a relaxed position beside your body  Begin with abdominal bracing  Next, raise one leg, keeping your knee bent  Hold for 5 seconds  Repeat with the other leg  When you can do this exercise without pain for 10 to 15 seconds, you may raise one straight leg and hold  Repeat with the other leg  · Quadruped:  Place your hands and knees on the floor  Keep your wrists directly below your shoulders and your knees directly below your hips  Pull your belly button in toward your spine  Do not flatten or arch your back  Tighten your abdominal muscles below your belly button  Hold for 5 seconds  When you can do this exercise without pain for 10 to 15 seconds, you may extend one arm and hold  Repeat on the other side  · Side bridge exercises:      ? Standing side bridge:  Stand next to a wall and extend one arm toward the wall  Place your palm flat on the wall with your fingers pointing upward  Begin with abdominal bracing  Next, without moving your feet, slowly bend your arm to 90 degrees  Hold for 5 seconds  Repeat on the other side  When you can do this exercise without pain for 10 to 15 seconds, you may do the bent leg side bridge on the floor  ? Bent leg side bridge:  Lie on one side with your legs, hips, and shoulders in a straight line   Prop yourself up onto your forearm so your elbow is directly below your shoulder  Bend your knees back to 90 degrees  Begin with abdominal bracing  Next, lift your hips and balance yourself on your forearm and knees  Hold for 5 seconds  Repeat on the other side  When you can do this exercise without pain for 10 to 15 seconds, you may do the straight leg side bridge on the floor  ? Straight leg side bridge:  Lie on one side with your legs, hips, and shoulders in a straight line  Prop yourself up onto your forearm so your elbow is directly below your shoulder  Begin with abdominal bracing  Lift your hips off the floor and balance yourself on your forearm and the outside of your flexed foot  Do not let your ankle bend sideways  Hold for 5 seconds  Repeat on the other side  When you can do this exercise without pain for 10 to 15 seconds, ask your healthcare provider for more advanced exercises  · Superman:  Lie on your stomach  Extend your arms forward on the floor  Tighten your abdominal muscles and lift your right hand and left leg off the floor  Hold this position  Slowly return to the starting position  Tighten your abdominal muscles and lift your left hand and right leg off the floor  Hold this position  Slowly return to the starting position  · Clam:  Lie on your side with your knees bent  Put your bottom arm under your head to keep your neck in line  Put your top hand on your hip to keep your pelvis from moving  Put your heels together, and keep them together during this exercise  Slowly raise your top knee toward the ceiling  Then lower your leg so your knees are together  Repeat this exercise 10 times  Then switch sides and do the exercise 10 times with the other leg  · Curl up:  Lie on your back with your knees bent and feet flat on the floor  Place your hands, palms down, underneath your lower back   Next, with your elbows on the floor, lift your shoulders and chest 2 to 3 inches off the floor  Keep your head in line with your shoulders  Hold this position  Slowly return to the starting position  · Straight leg raises:  Lie on your back with one leg straight  Bend the other knee and place your foot flat on the floor  Tighten your abdominal muscles  Keep your leg straight and slowly lift it straight up 6 to 12 inches off the floor  Hold this position  Lower your leg slowly  Do as many repetitions as directed on this side  Repeat with the other leg  · Plank:  Lie on your stomach  Bend your elbows and place your forearms flat on the floor  Lift your chest, stomach, and knees off the floor  Make sure your elbows are below your shoulders  Your body should be in a straight line  Do not let your hips or lower back sink to the ground  Squeeze your abdominal muscles together and hold for 15 seconds  To make this exercise harder, hold for 30 seconds or lift 1 leg at a time  · Bicycles:  Lie on your back  Bend both knees and bring them toward your chest  Your calves should be parallel to the floor  Place the palms of your hands on the back of your head  Straighten your right leg and keep it lifted 2 inches off the floor  Raise your head and shoulders off the floor and twist towards your left  Keep your head and shoulders lifted  Bend your right knee while you straighten your left leg  Keep your left leg 2 inches off the floor  Twist your head and chest towards the left leg  Continue to straighten 1 leg at a time and twist        Follow up with your doctor as directed:  Write down your questions so you remember to ask them during your visits  © Copyright [employer] 2022 Information is for End User's use only and may not be sold, redistributed or otherwise used for commercial purposes  All illustrations and images included in CareNotes® are the copyrighted property of [employer]  or [facility] Laurie Tirado   The above information is an  only   It is not intended as medical advice for individual conditions or treatments  Talk to your doctor, nurse or pharmacist before following any medical regimen to see if it is safe and effective for you

## 2022-04-29 NOTE — PROGRESS NOTES
Assessment  1  Lumbar facet arthropathy    2  Lumbar radiculopathy    Greater than 90% relief of pain with improved ability to participate with IADLs after bilateral L3, L4, L5 medial branch blocks #1 and #2  2 weeks s/p RFA; counseled patient that it can take up to 4-6 weeks to feeel complete relief from procedure  Prior to this described axial low back pain described primarily arthritic features  Positive facet loading maneuvers consistent with prominent lumbar facet arthropathy noted on MRI lumbar spine  Previously reported the following symptomatology:     MRI findings show mild noncompressive lumbar degenerative Disc disease with lumbar facet arthropathy noted at lower lumbar levels  Mild weakness with right hip flexion, knee extension and right ankle dorsiflexion  Additionally has pain with bilateral facet loading maneuvers and lumbar spine as well as with axial rotation  Reports symptomatology of lumbar radiculopathy in right L4 and L5 dermatomal distributions accompanied by weakness numbness and paresthesias at times  Physical therapy has improved her symptoms to a moderate extent  Reasonable to proceed with right L4 and L5 TFESI to target radicular pain at this time as part of multimodal pain therapy plan  If symptomatology lumbar facet arthropathy are more prevalent, we may proceed with medial branch blocks  All interventions including epidural steroid injections, medial branch blocks and subsequent radiofrequency ablation if successful were thoroughly discussed with patient  Handouts provided and questions answered to patient's satisfaction  Plan  -RTC 4 weeks to consider SCS trial in 4 weeks if no relief from RFA  -gabapentin increased to 300 mg t i d  Ordered for patient; may taper counseled regarding sedative effects of taking this medication and provided up titration calendar    Counseled not to take medication while driving or operating heavy machinery/using stairs  -Ibuprofen 600 mg t i d  prn pain previously Prescribed; may taper Patient educated regarding bleeding risk of taking this medication not taking any other nonsteroidal anti-inflammatory medications while taking this medication; counseled thoroughly regarding potential risk of Cardiovascular injury, Kidney injury, Gastrointestinal ulceration/bleeding  Patient voiced understanding; gi ppx additionally written  -physical therapy for right-sided lumbar radiculopathy, facet arthropathy; Core exercises additionally provided for physician directed home PT that the patient plans to participate with for 1 hour, twice a week for the next 6 weeks  There are risks associated with opioid medications, including dependence, addiction and tolerance  The patient understands and agrees to use these medications only as prescribed  Potential side effects of the medications include, but are not limited to, constipation, drowsiness, addiction, impaired judgment and risk of fatal overdose if not taken as prescribed  The patient was warned against driving while taking sedation medications  Sharing medications is a felony  At this point in time, the patient is showing no signs of addiction, abuse, diversion or suicidal ideation  [facility] Prescription Drug Monitoring Program report was reviewed and was appropriate     Complete risks and benefits including bleeding, infection, tissue reaction, nerve injury and allergic reaction were discussed  The approach was demonstrated using models and literature was provided  Verbal and written consent was obtained  My impressions and treatment recommendations were discussed in detail with the patient who verbalized understanding and had no further questions  Discharge instructions were provided  I personally saw and examined the patient and I agree with the above discussed plan of care  No orders of the defined types were placed in this encounter        History of Present Illness    Greater than 90% relief of pain with improved ability to participate with IADLs after bilateral L3, L4, L5 medial branch blocks #1 and #2  2 weeks s/p RFA  Prior to this described axial low back pain described primarily arthritic features  Positive facet loading maneuvers consistent with prominent lumbar facet arthropathy noted on MRI lumbar spine  Risks benefits and alternatives to lumbar medial branch blocks and subsequent radiofrequency ablation of successful thoroughly discussed with patient  Handouts provided and questions answered to satisfaction  Previously reported the following symptomatology:     Christiano Bettencourt is a 46 y o  female  With past medical history of hypercholesterolemia presenting with 12 week history of lumbar radicular pain in right L4 and L5 dermatomal distribution accompanied by weakness numbness and paresthesias  The patient rates the pain at a 8/10 accompanied by electric shock-like shooting features and crampy burning pain in the aforementioned dermatomal distributions  She additionally notes symptomatology of aching, nagging, indolent, stabbing, throbbing features of the pain in her back, symptomatic of arthritic like low back pain  The pain is worse in the mornings as well as the end of the day; exertion such as walking for long periods of time seems to exacerbate the pain  The patient can hardly walk more than a few blocks without having debilitating pain  She tries to maintain an active lifestyle and finds that the current degree of pain seems to compromises her efforts  The pain significantly impacts independent activities of daily living and contributes to significant disability  She has attempted a 1 month course of physical therapy with moderate relief of the pain  She has taken ibuprofen as well as flexeril with limited relief of the pain as well    She has never tried epidural steroid injections in the past  She denies any bowel or bladder dysfunction/incontinence    I have personally reviewed and/or updated the patient's past medical history, past surgical history, family history, social history, current medications, allergies, and vital signs today  Review of Systems   Constitutional: Positive for activity change  HENT: Negative  Eyes: Negative  Respiratory: Negative  Cardiovascular: Negative  Gastrointestinal: Negative  Endocrine: Negative  Genitourinary: Negative  Musculoskeletal: Positive for arthralgias, back pain, gait problem and myalgias  Skin: Negative  Allergic/Immunologic: Negative  Neurological: Positive for weakness and numbness  Hematological: Negative  Psychiatric/Behavioral: Negative  All other systems reviewed and are negative        Patient Active Problem List   Diagnosis    Mixed hyperlipidemia    Hypertension    Anxiety    Vitamin D deficiency    Lumbar facet arthropathy    Lumbar radiculopathy       Past Medical History:   Diagnosis Date    Hyperlipidemia     Hypertension        Past Surgical History:   Procedure Laterality Date    BREAST BIOPSY Left 2009    core bx; benign     CHOLECYSTECTOMY  1995    EPIDURAL BLOCK INJECTION Right 12/2/2021    Procedure: L4 and L5 TRANSFORAMINAL EPIDURAL STEROID INJECTION;  Surgeon: Hernando Velazquez MD;  Location: [facility];  Service: Pain Management     EPIDURAL BLOCK INJECTION N/A 12/28/2021    Procedure: BLOCK / INJECTION EPIDURAL STEROID LUMBAR L4-L5;  Surgeon: Hernando Velazquez MD;  Location: [facility];  Service: Pain Management     FL GUIDED NEEDLE PLAC BX/ASP/INJ  3/3/2022    FL GUIDED NEEDLE PLAC BX/ASP/INJ  4/14/2022    HYSTERECTOMY      age 26    NERVE BLOCK Bilateral 2/15/2022    Procedure: BLOCK MEDIAL BRANCH L3, L4, L5 #1;  Surgeon: Hernando Velazquez MD;  [facility]: [facility];  Service: Pain Management     NERVE BLOCK Bilateral 3/3/2022    Procedure: BLOCK MEDIAL BRANCH L3, L4, L5 #2;  Surgeon: Hernando Velazquez MD;  [facility]: [facility];  Service: Pain Management     RHIZOTOMY Bilateral 4/14/2022    Procedure: RHIZOTOMY LUMBAR L3, L4, L5;  Surgeon: Olayinka West MD;  Location: [facility];  Service: Pain Management     TOOTH EXTRACTION         Family History   Problem Relation Age of Onset    Alzheimer's disease Mother     Lung cancer Mother     Heart disease Father         cardiac disorder    Prostate cancer Father     Breast cancer Sister     No Known Problems Daughter     Tongue cancer Maternal Grandmother     Lung cancer Maternal Grandfather     No Known Problems Paternal Grandmother     Lung cancer Paternal Grandfather     Emphysema Maternal Aunt     No Known Problems Sister     No Known Problems Sister     Breast cancer Cousin 39       Social History     Occupational History    Not on file   Tobacco Use    Smoking status: Never Smoker    Smokeless tobacco: Never Used   Vaping Use    Vaping Use: Never used   Substance and Sexual Activity    Alcohol use: Yes     Comment: social drinker    Drug use: No    Sexual activity: Yes     Partners: Male     Birth control/protection: None     Comment: hysterectomy       Current Outpatient Medications on File Prior to Visit   Medication Sig    Calcium-Vitamin D-Vitamin K (VIACTIV PO) Take by mouth daily    cetirizine (ZyrTEC) 10 MG chewable tablet Chew 10 mg daily    Cholecalciferol (RA VITAMIN D-3) 2000 units CAPS Take by mouth daily    desvenlafaxine succinate (PRISTIQ) 50 mg 24 hr tablet Take 1 tablet (50 mg total) by mouth daily    gabapentin (NEURONTIN) 300 mg capsule Take 1 capsule (300 mg total) by mouth 3 (three) times a day    ibuprofen (MOTRIN) 600 mg tablet Take 1 tablet (600 mg total) by mouth every 12 (twelve) hours as needed for moderate pain    metoprolol succinate (Toprol XL) 50 mg 24 hr tablet Take 1 tablet (50 mg total) by mouth daily    Multiple Vitamin (MULTIVITAMIN) tablet Take 1 tablet by mouth daily    pantoprazole (PROTONIX) 40 mg tablet TAKE 1 TABLET DAILY    rosuvastatin (CRESTOR) 10 MG tablet TAKE 1 TABLET DAILY    spironolactone (ALDACTONE) 25 mg tablet Take 1 tablet (25 mg total) by mouth daily    Zoster Vac Recomb Adjuvanted (Shingrix) 50 MCG/0 5ML SUSR 0 5mL IM for one dose, followed by 0 5mL IM 2-6 months after first dose    [DISCONTINUED] methylPREDNISolone 4 MG tablet therapy pack Use as directed on package     No current facility-administered medications on file prior to visit  Allergies   Allergen Reactions    Codeine Other (See Comments)     "throat swells"         Physical Exam    /86   Pulse 77   Temp 97 5 °F (36 4 °C)   Resp 20   Ht 5' 7" (1 702 m)   Wt 90 8 kg (200 lb 3 2 oz)   BMI 31 36 kg/m²     Constitutional: normal, well developed, well nourished, alert, in no distress and non-toxic and no overt pain behavior  and overweight  Eyes: anicteric  HEENT: grossly intact  Neck: supple, symmetric, trachea midline and no masses   Pulmonary:even and unlabored  Cardiovascular:No edema or pitting edema present  Skin:Normal without rashes or lesions and well hydrated  Psychiatric:Mood and affect appropriate  Neurologic:Cranial Nerves II-XII grossly intact Sensation grossly intact; no clonus negative bethea's  Reflexes 2+ and brisk  SLR weakly positive right sided  Musculoskeletal:normal gait  Mild weakness with right sided hip flexion, knee extension and right ehl extension  Otherwise 5/5 strength in all other active range of motion movements b/l  Normal heel toe and tip toe walking  Significant pain with lumbar facet loading bilaterally and with lateral spine rotation  ttp over lumbar paraspinal muscles  Negative deo's test, negative gaenslen's negative SIJ loading bilaterally  Imaging    MRI LUMBAR SPINE WITHOUT CONTRAST     INDICATION: M54 [id number]: Radiculopathy, lumbar region  8 weeks of right-sided lumbar radiculopathy  Acute intractable low back pain  Status post 6 weeks of physical therapy  Leg weakness    Low back pain      COMPARISON: 8/17/2021     TECHNIQUE:  Sagittal T1, sagittal T2, sagittal inversion recovery, axial T1 and axial T2, coronal T2     IMAGE QUALITY:  Diagnostic     FINDINGS:     VERTEBRAL BODIES:  There are 5 lumbar type vertebral bodies  Normal alignment of the lumbar spine  No spondylolysis or spondylolisthesis  No scoliosis  No compression fracture  Scattered degenerative endplate changes  No focally suspicious marrow   lesions  No bone marrow edema or compression abnormality  Type II Modic endplate changes Q26-O5  Hemangioma T12 vertebra  Small hemangioma centrally in the L4 vertebra      SACRUM:  Normal signal within the sacrum  No evidence of insufficiency or stress fracture      DISTAL CORD AND CONUS:  Normal size and signal within the distal cord and conus  The conus medullaris terminates at the superior endplate of L2      PARASPINAL SOFT TISSUES:  Paraspinal soft tissues are unremarkable      LOWER THORACIC DISC SPACES:  Mild noncompressive lower thoracic degenerative change      LUMBAR DISC SPACES:     L1-L2:  Normal      L2-L3:  Normal      L3-L4:  There is a left neural foraminal disc herniation, protrusion type  Mild left neural foraminal narrowing  Central canal and right neural foramen patent      L4-L5:  There is a diffuse disk bulge  No significant central canal or neural foraminal narrowing  Bilateral facet hypertrophy noted      L5-S1:  There is a diffuse disk bulge  No significant central canal or neural foraminal narrowing  Bilateral facet hypertrophy noted      IMPRESSION:     Mild noncompressive lumbar degenerative change

## 2022-05-02 ENCOUNTER — HOSPITAL ENCOUNTER (OUTPATIENT)
Dept: MAMMOGRAPHY | Facility: HOSPITAL | Age: 52
Discharge: HOME | End: 2022-05-02
Attending: FAMILY MEDICINE
Payer: COMMERCIAL

## 2022-05-02 VITALS — BODY MASS INDEX: 31.42 KG/M2 | HEIGHT: 67 IN | WEIGHT: 200.18 LBS

## 2022-05-02 DIAGNOSIS — Z12.31 ENCOUNTER FOR SCREENING MAMMOGRAM FOR BREAST CANCER: ICD-10-CM

## 2022-05-02 PROCEDURE — 77067 SCR MAMMO BI INCL CAD: CPT

## 2022-05-02 PROCEDURE — 77063 BREAST TOMOSYNTHESIS BI: CPT

## 2022-05-05 ENCOUNTER — HOSPITAL ENCOUNTER (OUTPATIENT)
Dept: ULTRASOUND IMAGING | Facility: HOSPITAL | Age: 52
Discharge: HOME | End: 2022-05-05
Attending: FAMILY MEDICINE
Payer: COMMERCIAL

## 2022-05-05 DIAGNOSIS — R92.8 ABNORMAL MAMMOGRAM: ICD-10-CM

## 2022-05-05 PROCEDURE — 76642 ULTRASOUND BREAST LIMITED: CPT

## 2022-05-17 NOTE — PROGRESS NOTES
Call placed to patient regarding recommendation for;    __X___ RIGHT ______LEFT      __X___Ultrasound guided  ______Stereotactic breast biopsy  Pt states that procedure was explained to her, additional questions answered at this time    __X___Verbalized understanding        Blood thinners: No: __X___ Yes: _____ What:     Biopsy teaching sheet given:  _____yes __X__no (All teaching points discussed during call, pt with no questions at this time, pt adv to arrive at 0900 biopsy)    Pt given name/# for any further questions/needs    Pt agreeable to a post procedure call and states we can give her biopsy results to her over the phone

## 2022-05-18 ENCOUNTER — HOSPITAL ENCOUNTER (OUTPATIENT)
Dept: ULTRASOUND IMAGING | Facility: HOSPITAL | Age: 52
Discharge: HOME | End: 2022-05-18
Admitting: RADIOLOGY
Payer: COMMERCIAL

## 2022-05-18 ENCOUNTER — HOSPITAL ENCOUNTER (OUTPATIENT)
Dept: MAMMOGRAPHY | Facility: HOSPITAL | Age: 52
Discharge: HOME | End: 2022-05-18

## 2022-05-18 VITALS — HEIGHT: 67 IN | WEIGHT: 200 LBS | BODY MASS INDEX: 31.39 KG/M2

## 2022-05-18 DIAGNOSIS — R92.8 ABNORMAL MAMMOGRAM: ICD-10-CM

## 2022-05-18 PROCEDURE — 88305 TISSUE EXAM BY PATHOLOGIST: CPT | Performed by: SPECIALIST

## 2022-05-18 PROCEDURE — 88341 IMHCHEM/IMCYTCHM EA ADD ANTB: CPT | Performed by: SPECIALIST

## 2022-05-18 PROCEDURE — 88342 IMHCHEM/IMCYTCHM 1ST ANTB: CPT | Performed by: SPECIALIST

## 2022-05-18 PROCEDURE — A4648 IMPLANTABLE TISSUE MARKER: HCPCS

## 2022-05-18 PROCEDURE — 19000 PUNCTURE ASPIR CYST BREAST: CPT

## 2022-05-18 RX ORDER — LIDOCAINE HYDROCHLORIDE 10 MG/ML
5 INJECTION, SOLUTION EPIDURAL; INFILTRATION; INTRACAUDAL; PERINEURAL
Status: COMPLETED | OUTPATIENT
Start: 2022-05-18 | End: 2022-05-18

## 2022-05-18 RX ADMIN — LIDOCAINE HYDROCHLORIDE 5 ML: 10 INJECTION, SOLUTION EPIDURAL; INFILTRATION; INTRACAUDAL; PERINEURAL at 09:40

## 2022-05-19 NOTE — PROGRESS NOTES
Post procedure call completed 5/19/22 at 1649    Bleeding: _____yes __X___no    Pain: _____yes ___X___no (soreness)    Redness/Swelling: ______yes ___X___no    Pt with no questions at this time, adv will call when results available, adv to call with any questions or concerns, has name/# for contact

## 2022-05-23 ENCOUNTER — TELEPHONE (OUTPATIENT)
Dept: MAMMOGRAPHY | Facility: CLINIC | Age: 52
End: 2022-05-23

## 2022-05-23 NOTE — RESULT ENCOUNTER NOTE
Call patient to notify normal results breast biopsy is negative patient can return to normal schedule for mammographies

## 2022-05-26 ENCOUNTER — TELEPHONE (OUTPATIENT)
Dept: OTHER | Facility: OTHER | Age: 52
End: 2022-05-26

## 2022-05-26 NOTE — TELEPHONE ENCOUNTER
Pt calling to cancel appointment for 5/27/2022 at 8:45 am not able to make   Please call patient back to r/s

## 2022-07-07 ENCOUNTER — OFFICE VISIT (OUTPATIENT)
Dept: FAMILY MEDICINE CLINIC | Facility: CLINIC | Age: 52
End: 2022-07-07
Payer: COMMERCIAL

## 2022-07-07 VITALS
HEART RATE: 77 BPM | HEIGHT: 67 IN | WEIGHT: 206 LBS | OXYGEN SATURATION: 97 % | SYSTOLIC BLOOD PRESSURE: 138 MMHG | TEMPERATURE: 97.4 F | BODY MASS INDEX: 32.33 KG/M2 | DIASTOLIC BLOOD PRESSURE: 88 MMHG

## 2022-07-07 DIAGNOSIS — J06.9 UPPER RESPIRATORY TRACT INFECTION, UNSPECIFIED TYPE: Primary | ICD-10-CM

## 2022-07-07 DIAGNOSIS — B34.9 VIRAL INFECTION, UNSPECIFIED: ICD-10-CM

## 2022-07-07 PROCEDURE — U0003 INFECTIOUS AGENT DETECTION BY NUCLEIC ACID (DNA OR RNA); SEVERE ACUTE RESPIRATORY SYNDROME CORONAVIRUS 2 (SARS-COV-2) (CORONAVIRUS DISEASE [COVID-19]), AMPLIFIED PROBE TECHNIQUE, MAKING USE OF HIGH THROUGHPUT TECHNOLOGIES AS DESCRIBED BY CMS-2020-01-R: HCPCS | Performed by: FAMILY MEDICINE

## 2022-07-07 PROCEDURE — 99213 OFFICE O/P EST LOW 20 MIN: CPT | Performed by: FAMILY MEDICINE

## 2022-07-07 PROCEDURE — U0005 INFEC AGEN DETEC AMPLI PROBE: HCPCS | Performed by: FAMILY MEDICINE

## 2022-07-07 PROCEDURE — 3725F SCREEN DEPRESSION PERFORMED: CPT | Performed by: FAMILY MEDICINE

## 2022-07-07 RX ORDER — DOXYCYCLINE HYCLATE 100 MG
100 TABLET ORAL 2 TIMES DAILY
Qty: 14 TABLET | Refills: 0 | Status: SHIPPED | OUTPATIENT
Start: 2022-07-07 | End: 2022-07-14

## 2022-07-07 NOTE — PROGRESS NOTES
Depression Screening and Follow-up Plan: Patient was screened for depression during today's encounter  They screened negative with a PHQ-2 score of 0  Assessment/Plan:    Problem List Items Addressed This Visit    None     Visit Diagnoses     Upper respiratory tract infection, unspecified type    -  Primary           Diagnoses and all orders for this visit:    Upper respiratory tract infection, unspecified type        No problem-specific Assessment & Plan notes found for this encounter  Subjective:      Patient ID: Bri Angel is a 46 y o  female  And Brandon Bergeron is here today with the chief complaint of upper respiratory illness since last Thursday there is he about 8 days ago she has developed sinus symptoms has purulence discharge from her sinuses sore throat fatigue call loose cough no fever she has had to COVID injections she she is able to taste and smell      The following portions of the patient's history were reviewed and updated as appropriate:   She has a past medical history of Hyperlipidemia and Hypertension  ,  does not have any pertinent problems on file  ,   has a past surgical history that includes Cholecystectomy (1995); Tooth extraction; Hysterectomy; Breast biopsy (Left, 2009); Epidural block injection (Right, 12/02/2021); Epidural block injection (N/A, 12/28/2021); NERVE BLOCK (Bilateral, 02/15/2022); NERVE BLOCK (Bilateral, 03/03/2022); FL guided needle plac bx/asp/inj (03/03/2022); Rhizotomy (Bilateral, 04/14/2022); FL guided needle plac bx/asp/inj (04/14/2022); Breast biopsy (Right, 05/18/2022); and US guided breast biopsy right complete (Right, 5/18/2022)  ,  family history includes Alzheimer's disease in her mother; Breast cancer (age of onset: 39) in her cousin; Breast cancer (age of onset: 59) in her sister; Emphysema in her maternal aunt;  Heart disease in her father; Lung cancer in her maternal grandfather, mother, and paternal grandfather; No Known Problems in her daughter, paternal aunt, paternal aunt, paternal aunt, paternal grandmother, son, and son; Prostate cancer in her father; Tongue cancer in her maternal grandmother  ,   reports that she has never smoked  She has never used smokeless tobacco  She reports current alcohol use  She reports that she does not use drugs  ,  is allergic to codeine     Current Outpatient Medications   Medication Sig Dispense Refill    Calcium-Vitamin D-Vitamin K (VIACTIV PO) Take by mouth daily      cetirizine (ZyrTEC) 10 MG chewable tablet Chew 10 mg daily      Cholecalciferol 50 MCG (2000 UT) CAPS Take by mouth daily      desvenlafaxine succinate (PRISTIQ) 50 mg 24 hr tablet Take 1 tablet (50 mg total) by mouth daily 90 tablet 3    gabapentin (NEURONTIN) 300 mg capsule Take 1 capsule (300 mg total) by mouth 3 (three) times a day (Patient taking differently: Take 300 mg by mouth 2 (two) times a day) 270 capsule 0    ibuprofen (MOTRIN) 600 mg tablet Take 1 tablet (600 mg total) by mouth every 12 (twelve) hours as needed for moderate pain 180 tablet 0    metoprolol succinate (Toprol XL) 50 mg 24 hr tablet Take 1 tablet (50 mg total) by mouth daily 30 tablet 1    Multiple Vitamin (MULTIVITAMIN) tablet Take 1 tablet by mouth daily      pantoprazole (PROTONIX) 40 mg tablet TAKE 1 TABLET DAILY 90 tablet 3    rosuvastatin (CRESTOR) 10 MG tablet TAKE 1 TABLET DAILY 90 tablet 3    spironolactone (ALDACTONE) 25 mg tablet Take 1 tablet (25 mg total) by mouth daily 90 tablet 1    Zoster Vac Recomb Adjuvanted (Shingrix) 50 MCG/0 5ML SUSR 0 5mL IM for one dose, followed by 0 5mL IM 2-6 months after first dose (Patient not taking: Reported on 7/7/2022) 1 each 1     No current facility-administered medications for this visit  Review of Systems   Constitutional: Positive for activity change and fatigue  Negative for appetite change, diaphoresis and fever  HENT: Negative  Negative for dental problem  Eyes: Negative      Respiratory: Positive for cough and chest tightness  Negative for apnea, shortness of breath and wheezing  Cardiovascular: Negative for chest pain, palpitations and leg swelling  Gastrointestinal: Negative for abdominal distention, abdominal pain, anal bleeding, constipation, diarrhea, nausea and vomiting  Endocrine: Negative for cold intolerance, heat intolerance, polydipsia, polyphagia and polyuria  Genitourinary: Negative for difficulty urinating, dysuria, flank pain, hematuria and urgency  Musculoskeletal: Negative for arthralgias, back pain, gait problem, joint swelling and myalgias  Skin: Negative for color change, rash and wound  Allergic/Immunologic: Negative for environmental allergies, food allergies and immunocompromised state  Neurological: Negative for dizziness, seizures, syncope, speech difficulty, numbness and headaches  Hematological: Negative for adenopathy  Does not bruise/bleed easily  Psychiatric/Behavioral: Negative for agitation, behavioral problems, hallucinations, sleep disturbance and suicidal ideas  Objective:  Vitals:    07/07/22 1438   BP: 138/88   BP Location: Left arm   Patient Position: Sitting   Pulse: 77   Temp: (!) 97 4 °F (36 3 °C)   TempSrc: Temporal   SpO2: 97%   Weight: 93 4 kg (206 lb)   Height: 5' 7" (1 702 m)     Body mass index is 32 26 kg/m²  Physical Exam  Constitutional:       General: She is not in acute distress  Appearance: She is well-developed  She is not diaphoretic  HENT:      Head: Normocephalic  Right Ear: External ear normal       Left Ear: External ear normal       Nose: Congestion and rhinorrhea present  Eyes:      General: No scleral icterus  Right eye: No discharge  Left eye: No discharge  Conjunctiva/sclera: Conjunctivae normal       Pupils: Pupils are equal, round, and reactive to light  Neck:      Thyroid: No thyromegaly  Trachea: No tracheal deviation     Cardiovascular:      Rate and Rhythm: Normal rate and regular rhythm  Heart sounds: Normal heart sounds  No murmur heard  No friction rub  No gallop  Pulmonary:      Effort: Pulmonary effort is normal  No respiratory distress  Breath sounds: Normal breath sounds  No wheezing  Abdominal:      General: Bowel sounds are normal       Palpations: Abdomen is soft  There is no mass  Tenderness: There is no abdominal tenderness  There is no guarding  Musculoskeletal:         General: No deformity  Cervical back: Normal range of motion  Lymphadenopathy:      Cervical: No cervical adenopathy  Skin:     General: Skin is warm and dry  Findings: No erythema or rash  Neurological:      Mental Status: She is alert and oriented to person, place, and time  Cranial Nerves: No cranial nerve deficit  Psychiatric:         Thought Content:  Thought content normal

## 2022-07-08 LAB — SARS-COV-2 RNA RESP QL NAA+PROBE: NEGATIVE

## 2022-08-03 ENCOUNTER — TELEPHONE (OUTPATIENT)
Dept: FAMILY MEDICINE CLINIC | Facility: CLINIC | Age: 52
End: 2022-08-03

## 2022-08-03 DIAGNOSIS — J06.9 UPPER RESPIRATORY TRACT INFECTION, UNSPECIFIED TYPE: Primary | ICD-10-CM

## 2022-08-03 NOTE — TELEPHONE ENCOUNTER
I think since she has relapsed and now has symptoms again we should do a PCR COVID here at our office because the home tests are good but they are not perfect and I also think we should do a chest x-ray on if the PCR COVID is positive then I do not think we need to provide any antibiotics would also be advisable to do a throat culture so if she comes to the office for a drive-through test tomorrow on remind them that we need to do a throat culture also at the same time chest x-ray can be done at the care now but she does need to mask up when she enters the building

## 2022-08-03 NOTE — TELEPHONE ENCOUNTER
She was here 3 weeks ago with a  sore throat , cough,and  body aches    She finished the antibiotic and  The symtoms started again and tested negative for covid with home test

## 2022-08-06 ENCOUNTER — OFFICE VISIT (OUTPATIENT)
Dept: URGENT CARE | Facility: MEDICAL CENTER | Age: 52
End: 2022-08-06
Payer: COMMERCIAL

## 2022-08-06 ENCOUNTER — APPOINTMENT (OUTPATIENT)
Dept: RADIOLOGY | Facility: MEDICAL CENTER | Age: 52
End: 2022-08-06
Payer: COMMERCIAL

## 2022-08-06 VITALS
HEART RATE: 79 BPM | RESPIRATION RATE: 18 BRPM | OXYGEN SATURATION: 98 % | WEIGHT: 202.8 LBS | HEIGHT: 68 IN | TEMPERATURE: 98 F | BODY MASS INDEX: 30.74 KG/M2

## 2022-08-06 DIAGNOSIS — J06.9 UPPER RESPIRATORY TRACT INFECTION, UNSPECIFIED TYPE: ICD-10-CM

## 2022-08-06 DIAGNOSIS — J20.9 ACUTE BRONCHITIS, UNSPECIFIED ORGANISM: Primary | ICD-10-CM

## 2022-08-06 PROCEDURE — 71046 X-RAY EXAM CHEST 2 VIEWS: CPT

## 2022-08-06 PROCEDURE — 99213 OFFICE O/P EST LOW 20 MIN: CPT

## 2022-08-06 RX ORDER — PREDNISONE 10 MG/1
TABLET ORAL
Qty: 21 TABLET | Refills: 0 | Status: SHIPPED | OUTPATIENT
Start: 2022-08-06 | End: 2022-08-12

## 2022-08-06 RX ORDER — BENZONATATE 100 MG/1
100 CAPSULE ORAL 3 TIMES DAILY PRN
Qty: 20 CAPSULE | Refills: 0 | Status: SHIPPED | OUTPATIENT
Start: 2022-08-06

## 2022-08-06 NOTE — PATIENT INSTRUCTIONS
Acute Bronchitis   WHAT YOU NEED TO KNOW:   Acute bronchitis is swelling and irritation in your lungs  It is usually caused by a virus and most often happens in the winter  Bronchitis may also be caused by bacteria or by a chemical irritant, such as smoke  DISCHARGE INSTRUCTIONS:   Return to the emergency department if:   You cough up blood  Your lips or fingernails turn blue  You feel like you are not getting enough air when you breathe  Call your doctor if:   Your symptoms do not go away or get worse, even after treatment  Your cough does not get better within 4 weeks  You have questions or concerns about your condition or care  Medicines: You may  need any of the following:  Cough suppressants  decrease your urge to cough  Decongestants  help loosen mucus in your lungs and make it easier to cough up  This can help you breathe easier  Inhalers  may be given  Your healthcare provider may give you one or more inhalers to help you breathe easier and cough less  An inhaler gives your medicine to open your airways  Ask your healthcare provider to show you how to use your inhaler correctly  Antibiotics  may be given for up to 5 days if your bronchitis is caused by bacteria  Acetaminophen  decreases pain and fever  It is available without a doctor's order  Ask how much to take and how often to take it  Follow directions  Read the labels of all other medicines you are using to see if they also contain acetaminophen, or ask your doctor or pharmacist  Acetaminophen can cause liver damage if not taken correctly  Do not use more than 4 grams (4,000 milligrams) total of acetaminophen in one day  NSAIDs  help decrease swelling and pain or fever  This medicine is available with or without a doctor's order  NSAIDs can cause stomach bleeding or kidney problems in certain people  If you take blood thinner medicine, always ask your healthcare provider if NSAIDs are safe for you   Always read the medicine label and follow directions  Take your medicine as directed  Contact your healthcare provider if you think your medicine is not helping or if you have side effects  Tell him of her if you are allergic to any medicine  Keep a list of the medicines, vitamins, and herbs you take  Include the amounts, and when and why you take them  Bring the list or the pill bottles to follow-up visits  Carry your medicine list with you in case of an emergency  Self-care:   Drink liquids as directed  You may need to drink more liquids than usual to stay hydrated  Ask how much liquid to drink each day and which liquids are best for you  Use a cool mist humidifier  to increase air moisture in your home  This may make it easier for you to breathe and help decrease your cough  Get more rest   Rest helps your body to heal  Slowly start to do more each day  Rest when you feel it is needed  Avoid irritants in the air  Avoid chemicals, fumes, and dust  Wear a face mask if you must work around dust or fumes  Stay inside on days when air pollution levels are high  If you have allergies, stay inside when pollen counts are high  Do not use aerosol products, such as spray-on deodorant, bug spray, and hair spray  Do not smoke or be around others who are smoking  Nicotine and other chemicals in cigarettes and cigars can cause lung damage  Ask your healthcare provider for information if you currently smoke and need help to quit  E-cigarettes or smokeless tobacco still contain nicotine  Talk to your healthcare provider before you use these products  Prevent acute bronchitis:       Ask about vaccines you may need  Get a flu vaccine each year as soon as recommended, usually in September or October  Ask your healthcare provider if you should also get a pneumonia or COVID-19 vaccine  Your healthcare provider can tell you if you should also get other vaccines, and when to get them  Prevent the spread of germs    You can decrease your risk for acute bronchitis and other illnesses by doing the following:     Wash your hands often with soap and water  Carry germ-killing hand lotion or gel with you  You can use the lotion or gel to clean your hands when soap and water are not available  Do not touch your eyes, nose, or mouth unless you have washed your hands first     Always cover your mouth when you cough to prevent the spread of germs  It is best to cough into a tissue or your shirt sleeve instead of into your hand  Ask those around you to cover their mouths when they cough  Try to avoid people who have a cold or the flu  If you are sick, stay away from others as much as possible  Follow up with your doctor as directed:  Write down questions you have so you will remember to ask them during your follow-up visits  © Copyright [employer] 2022 Information is for End User's use only and may not be sold, redistributed or otherwise used for commercial purposes  All illustrations and images included in CareNotes® are the copyrighted property of [facility]  or [employer]y Tirado   The above information is an  only  It is not intended as medical advice for individual conditions or treatments  Talk to your doctor, nurse or pharmacist before following any medical regimen to see if it is safe and effective for you

## 2022-08-11 ENCOUNTER — TELEPHONE (OUTPATIENT)
Dept: FAMILY MEDICINE CLINIC | Facility: CLINIC | Age: 52
End: 2022-08-11

## 2022-08-11 DIAGNOSIS — J20.9 ACUTE BRONCHITIS, UNSPECIFIED ORGANISM: Primary | ICD-10-CM

## 2022-08-11 RX ORDER — DOXYCYCLINE HYCLATE 100 MG
100 TABLET ORAL 2 TIMES DAILY
Qty: 14 TABLET | Refills: 0 | Status: SHIPPED | OUTPATIENT
Start: 2022-08-11 | End: 2022-08-18

## 2022-08-11 NOTE — TELEPHONE ENCOUNTER
Seen in Urgent care - Dx Acute Bronchitis - Rx'd steroid & Benzonatate - Chest Xray -     One day of steroids left - Feeling worse - Deep cough with inspiration   -  Asking what she should do? -  Could you Rx an antibiotic (Prevously recommended)? -  Do you want the Throat culture previously recommended or the COVID testing that's ordered Office collect? COVID tested was NOT done by urgent care - They told the Pt she did not need it      Pharm: [facility]    Allergy: Codeine

## 2022-08-15 DIAGNOSIS — Z20.822 EXPOSURE TO COVID-19 VIRUS: Primary | ICD-10-CM

## 2022-08-15 PROCEDURE — U0003 INFECTIOUS AGENT DETECTION BY NUCLEIC ACID (DNA OR RNA); SEVERE ACUTE RESPIRATORY SYNDROME CORONAVIRUS 2 (SARS-COV-2) (CORONAVIRUS DISEASE [COVID-19]), AMPLIFIED PROBE TECHNIQUE, MAKING USE OF HIGH THROUGHPUT TECHNOLOGIES AS DESCRIBED BY CMS-2020-01-R: HCPCS | Performed by: FAMILY MEDICINE

## 2022-08-15 PROCEDURE — U0005 INFEC AGEN DETEC AMPLI PROBE: HCPCS | Performed by: FAMILY MEDICINE

## 2022-08-16 LAB — SARS-COV-2 RNA RESP QL NAA+PROBE: POSITIVE

## 2022-08-19 NOTE — PROGRESS NOTES
[facility] [facility]        NAME: Caterina Miller is a 46 y o  female  : 1970    MRN: [id number]  DATE:  2022  TIME: 2:43 PM    Assessment and Plan   Acute bronchitis, unspecified organism [J20 [id number]]  1  Acute bronchitis, unspecified organism  benzonatate (TESSALON PERLES) 100 mg capsule    predniSONE 10 mg tablet         Patient Instructions     Patient has bronchitis which I will treat with a combination of an oral prednisone taper and Tessalon Perles  Recommend hydration, rest, discussed OTC cough meds, close observation  Follow up with PCP in 3-5 days  Proceed to  ER if symptoms worsen  Chief Complaint     Chief Complaint   Patient presents with    Sore Throat     Sore throat, cough and body aches that started 3 weeks ago, seen by pcp and was tested for covid, finished antibiotic and symptoms resolved, symptoms returned after 2 weeks, unable to get in with pcp         History of Present Illness       Patient presents for re-evaluation from previous visit that she had recently with her PCP  For the past 3 weeks, she has had cough, body aches, sore throat and was prescribed an antibiotic which she finished and she felt better but now after 2 weeks her symptoms have returned  She was negative for COVID testing at the time of her PCP visit  She has been trying to manage symptoms conservatively since onset without significant improvement or relief  Review of Systems   Review of Systems   Constitutional: Negative  HENT: Positive for sore throat  Negative for congestion  Respiratory: Positive for cough  Cardiovascular: Negative  Gastrointestinal: Negative  Genitourinary: Negative  Musculoskeletal: Positive for myalgias           Current Medications       Current Outpatient Medications:     benzonatate (TESSALON PERLES) 100 mg capsule, Take 1 capsule (100 mg total) by mouth 3 (three) times a day as needed for cough, Disp: 20 capsule, Rfl: 0    Calcium-Vitamin D-Vitamin K (VIACTIV PO), Take by mouth daily, Disp: , Rfl:     cetirizine (ZyrTEC) 10 MG chewable tablet, Chew 10 mg daily, Disp: , Rfl:     Cholecalciferol 50 MCG (2000 UT) CAPS, Take by mouth daily, Disp: , Rfl:     desvenlafaxine succinate (PRISTIQ) 50 mg 24 hr tablet, Take 1 tablet (50 mg total) by mouth daily, Disp: 90 tablet, Rfl: 3    gabapentin (NEURONTIN) 300 mg capsule, Take 1 capsule (300 mg total) by mouth 3 (three) times a day (Patient taking differently: Take 300 mg by mouth 2 (two) times a day), Disp: 270 capsule, Rfl: 0    ibuprofen (MOTRIN) 600 mg tablet, Take 1 tablet (600 mg total) by mouth every 12 (twelve) hours as needed for moderate pain, Disp: 180 tablet, Rfl: 0    metoprolol succinate (Toprol XL) 50 mg 24 hr tablet, Take 1 tablet (50 mg total) by mouth daily, Disp: 30 tablet, Rfl: 1    Multiple Vitamin (MULTIVITAMIN) tablet, Take 1 tablet by mouth daily, Disp: , Rfl:     pantoprazole (PROTONIX) 40 mg tablet, TAKE 1 TABLET DAILY, Disp: 90 tablet, Rfl: 3    rosuvastatin (CRESTOR) 10 MG tablet, TAKE 1 TABLET DAILY, Disp: 90 tablet, Rfl: 3    spironolactone (ALDACTONE) 25 mg tablet, Take 1 tablet (25 mg total) by mouth daily, Disp: 90 tablet, Rfl: 1    Zoster Vac Recomb Adjuvanted (Shingrix) 50 MCG/0 5ML SUSR, 0 5mL IM for one dose, followed by 0 5mL IM 2-6 months after first dose (Patient not taking: Reported on 7/7/2022), Disp: 1 each, Rfl: 1    Current Allergies     Allergies as of 08/06/2022 - Reviewed 08/06/2022   Allergen Reaction Noted    Codeine Other (See Comments)             The following portions of the patient's history were reviewed and updated as appropriate: allergies, current medications, past family history, past medical history, past social history, past surgical history and problem list      Past Medical History:   Diagnosis Date    Hyperlipidemia     Hypertension        Past Surgical History:   Procedure Laterality Date    BREAST BIOPSY Left 2009    core bx; benign     BREAST BIOPSY Right 05/18/2022    US guided biopsy    CHOLECYSTECTOMY  1995    EPIDURAL BLOCK INJECTION Right 12/02/2021    Procedure: L4 and L5 TRANSFORAMINAL EPIDURAL STEROID INJECTION;  Surgeon: Munira Catsrejon MD;  Location: [facility];  Service: Pain Management     EPIDURAL BLOCK INJECTION N/A 12/28/2021    Procedure: BLOCK / INJECTION EPIDURAL STEROID LUMBAR L4-L5;  Surgeon: Munira Castrejon MD;  Location: [facility];  Service: Pain Management     FL GUIDED NEEDLE PLAC BX/ASP/INJ  03/03/2022    FL GUIDED NEEDLE PLAC BX/ASP/INJ  04/14/2022    HYSTERECTOMY      age 26    NERVE BLOCK Bilateral 02/15/2022    Procedure: BLOCK MEDIAL BRANCH L3, L4, L5 #1;  Surgeon: Munira Castrejon MD;  Location: [facility];  Service: Pain Management     NERVE BLOCK Bilateral 03/03/2022    Procedure: BLOCK MEDIAL BRANCH L3, L4, L5 #2;  Surgeon: Munira Castrejon MD;  Location: [facility];  Service: Pain Management     RHIZOTOMY Bilateral 04/14/2022    Procedure: RHIZOTOMY LUMBAR L3, L4, L5;  Surgeon: Munira Castrejon MD;  Location: [facility];  Service: Pain Management     TOOTH EXTRACTION      US GUIDED BREAST BIOPSY RIGHT COMPLETE Right 5/18/2022       Family History   Problem Relation Age of Onset    Alzheimer's disease Mother     Lung cancer Mother     Heart disease Father         cardiac disorder    Prostate cancer Father     Breast cancer Sister 59    No Known Problems Daughter     Tongue cancer Maternal Grandmother     Lung cancer Maternal Grandfather     No Known Problems Paternal Grandmother     Lung cancer Paternal Grandfather     Emphysema Maternal Aunt     Breast cancer Cousin 39    No Known Problems Son     No Known Problems Son     No Known Problems Paternal Aunt     No Known Problems Paternal Aunt     No Known Problems Paternal Aunt          Medications have been verified          Objective   Pulse 79   Temp 98 °F (36 7 °C)   Resp 18   Ht 5' 8" (1 727 m)   Wt 92 kg (202 lb 12 8 oz)   SpO2 98% BMI 30 84 kg/m²   No LMP recorded  Patient has had a hysterectomy  Physical Exam     Physical Exam  Vitals reviewed  Constitutional:       General: She is not in acute distress  Appearance: She is well-developed  HENT:      Right Ear: Hearing, tympanic membrane, ear canal and external ear normal       Left Ear: Hearing, tympanic membrane, ear canal and external ear normal       Nose: Nose normal       Mouth/Throat:      Mouth: Mucous membranes are moist       Pharynx: Posterior oropharyngeal erythema present  No oropharyngeal exudate  Tonsils: No tonsillar exudate  Cardiovascular:      Rate and Rhythm: Normal rate and regular rhythm  Pulses: Normal pulses  Heart sounds: Normal heart sounds  No murmur heard  Pulmonary:      Effort: Pulmonary effort is normal  No respiratory distress  Breath sounds: Rhonchi (Bilateral diffuse coarse breath sounds heard throughout) present  No wheezing  Musculoskeletal:      Cervical back: Neck supple  Lymphadenopathy:      Cervical: No cervical adenopathy  Neurological:      Mental Status: She is alert and oriented to person, place, and time

## 2022-09-13 DIAGNOSIS — I10 ESSENTIAL HYPERTENSION: ICD-10-CM

## 2022-09-15 RX ORDER — SPIRONOLACTONE 25 MG/1
TABLET ORAL
Qty: 90 TABLET | Refills: 3 | Status: SHIPPED | OUTPATIENT
Start: 2022-09-15

## 2022-09-21 ENCOUNTER — TELEPHONE (OUTPATIENT)
Dept: PAIN MEDICINE | Facility: MEDICAL CENTER | Age: 52
End: 2022-09-21

## 2022-09-21 DIAGNOSIS — M54.16 LUMBAR RADICULOPATHY: ICD-10-CM

## 2022-09-21 NOTE — TELEPHONE ENCOUNTER
Pharmacy called stating if clarification can be given on instruction on medication  Please advise       If any questions please call [phone]   ty

## 2022-09-23 DIAGNOSIS — I10 PRIMARY HYPERTENSION: ICD-10-CM

## 2022-09-23 RX ORDER — METOPROLOL SUCCINATE 50 MG/1
TABLET, EXTENDED RELEASE ORAL
Qty: 90 TABLET | Refills: 3 | Status: SHIPPED | OUTPATIENT
Start: 2022-09-23

## 2022-09-27 RX ORDER — GABAPENTIN 300 MG/1
300 CAPSULE ORAL 3 TIMES DAILY
Qty: 270 CAPSULE | Refills: 0 | Status: SHIPPED | OUTPATIENT
Start: 2022-09-27 | End: 2022-10-26 | Stop reason: SDUPTHER

## 2022-09-27 NOTE — TELEPHONE ENCOUNTER
S/W [facility]  They need clarification on Gabapentin order faxed over 9/14  Please see media scan  Form is signed but blank  Can new Rx be electronically sent with instructions?

## 2022-09-27 NOTE — TELEPHONE ENCOUNTER
Pharmacy called stating if clarification can be given on instruction on medication      Please advise         If any questions please call [phone] SZD#[phone]   ty

## 2022-10-26 ENCOUNTER — OFFICE VISIT (OUTPATIENT)
Dept: PAIN MEDICINE | Facility: CLINIC | Age: 52
End: 2022-10-26
Payer: COMMERCIAL

## 2022-10-26 VITALS
SYSTOLIC BLOOD PRESSURE: 136 MMHG | DIASTOLIC BLOOD PRESSURE: 82 MMHG | TEMPERATURE: 97.5 F | RESPIRATION RATE: 20 BRPM | BODY MASS INDEX: 30.1 KG/M2 | HEIGHT: 68 IN | WEIGHT: 198.6 LBS | HEART RATE: 69 BPM

## 2022-10-26 DIAGNOSIS — M54.16 LUMBAR RADICULOPATHY, ACUTE: ICD-10-CM

## 2022-10-26 DIAGNOSIS — K21.9 GASTROESOPHAGEAL REFLUX DISEASE WITHOUT ESOPHAGITIS: ICD-10-CM

## 2022-10-26 DIAGNOSIS — M54.16 LUMBAR RADICULOPATHY: ICD-10-CM

## 2022-10-26 DIAGNOSIS — M47.816 LUMBAR FACET ARTHROPATHY: Primary | ICD-10-CM

## 2022-10-26 PROCEDURE — 99214 OFFICE O/P EST MOD 30 MIN: CPT | Performed by: ANESTHESIOLOGY

## 2022-10-26 RX ORDER — LIDOCAINE HYDROCHLORIDE 10 MG/ML
10 INJECTION, SOLUTION EPIDURAL; INFILTRATION; INTRACAUDAL; PERINEURAL ONCE
OUTPATIENT
Start: 2022-10-26 | End: 2022-10-26

## 2022-10-26 RX ORDER — PANTOPRAZOLE SODIUM 40 MG/1
40 TABLET, DELAYED RELEASE ORAL DAILY
Qty: 90 TABLET | Refills: 0 | Status: SHIPPED | OUTPATIENT
Start: 2022-10-26

## 2022-10-26 RX ORDER — GABAPENTIN 300 MG/1
300 CAPSULE ORAL 3 TIMES DAILY
Qty: 270 CAPSULE | Refills: 0 | Status: SHIPPED | OUTPATIENT
Start: 2022-10-26

## 2022-10-26 RX ORDER — METHYLPREDNISOLONE ACETATE 80 MG/ML
80 INJECTION, SUSPENSION INTRA-ARTICULAR; INTRALESIONAL; INTRAMUSCULAR; PARENTERAL; SOFT TISSUE ONCE
OUTPATIENT
Start: 2022-10-26 | End: 2022-10-26

## 2022-10-26 RX ORDER — BUPIVACAINE HCL/PF 2.5 MG/ML
5 VIAL (ML) INJECTION ONCE
OUTPATIENT
Start: 2022-10-26 | End: 2022-10-26

## 2022-10-26 RX ORDER — IBUPROFEN 600 MG/1
600 TABLET ORAL EVERY 12 HOURS PRN
Qty: 180 TABLET | Refills: 0 | Status: SHIPPED | OUTPATIENT
Start: 2022-10-26

## 2022-10-26 RX ORDER — TRIAMCINOLONE ACETONIDE 40 MG/ML
1 INJECTION, SUSPENSION INTRA-ARTICULAR; INTRAMUSCULAR
COMMUNITY

## 2022-10-26 RX ORDER — LIDOCAINE HYDROCHLORIDE 10 MG/ML
2 INJECTION, SOLUTION INFILTRATION; PERINEURAL
COMMUNITY

## 2022-10-26 RX ORDER — HYALURONATE SODIUM, STABILIZED 60 MG/3 ML
3 SYRINGE (ML) INTRAARTICULAR
COMMUNITY

## 2022-10-26 NOTE — PROGRESS NOTES
Assessment  1  Lumbar facet arthropathy    Greater than 90% relief of pain with improved ability to participate with IADLs after bilateral L3, L4, L5 medial branch blocks #1 and #2  2 weeks s/p RFA 4/14/22; Axial low back pain described by primarily arthritic features as returned after 6 months of relief  Positive facet loading maneuvers consistent with prominent lumbar facet arthropathy noted on MRI lumbar spine  Previously reported the following symptomatology:     MRI findings show mild noncompressive lumbar degenerative Disc disease with lumbar facet arthropathy noted at lower lumbar levels  Mild weakness with right hip flexion, knee extension and right ankle dorsiflexion  Additionally has pain with bilateral facet loading maneuvers and lumbar spine as well as with axial rotation  Reports symptomatology of lumbar radiculopathy in right L4 and L5 dermatomal distributions accompanied by weakness numbness and paresthesias at times  Physical therapy has improved her symptoms to a moderate extent  Reasonable to proceed with right L4 and L5 TFESI to target radicular pain at this time as part of multimodal pain therapy plan  If symptomatology lumbar facet arthropathy are more prevalent, we may proceed with medial branch blocks  All interventions including epidural steroid injections, medial branch blocks and subsequent radiofrequency ablation if successful were thoroughly discussed with patient  Handouts provided and questions answered to patient's satisfaction  Plan  -Repeat RF lesioning of medial branch nerves bilateral L3,L4, L5 with IV sedation  -gabapentin increased to 300 mg t i d  Ordered for patient; may taper counseled regarding sedative effects of taking this medication and provided up titration calendar    Counseled not to take medication while driving or operating heavy machinery/using stairs  -Ibuprofen 600 mg t i d  prn pain previously prescribed; may taper Patient educated regarding bleeding risk of taking this medication not taking any other nonsteroidal anti-inflammatory medications while taking this medication; counseled thoroughly regarding potential risk of Cardiovascular injury, Kidney injury, Gastrointestinal ulceration/bleeding  Patient voiced understanding; gi ppx additionally written; kidney labs checked this year  -physical therapy for right-sided lumbar radiculopathy, facet arthropathy; Core exercises additionally provided for physician directed home PT that the patient plans to participate with for 1 hour, twice a week for the next 6 weeks  There are risks associated with opioid medications, including dependence, addiction and tolerance  The patient understands and agrees to use these medications only as prescribed  Potential side effects of the medications include, but are not limited to, constipation, drowsiness, addiction, impaired judgment and risk of fatal overdose if not taken as prescribed  The patient was warned against driving while taking sedation medications  Sharing medications is a felony  At this point in time, the patient is showing no signs of addiction, abuse, diversion or suicidal ideation  [place] Prescription Drug Monitoring Program report was reviewed and was appropriate     Complete risks and benefits including bleeding, infection, tissue reaction, nerve injury and allergic reaction were discussed  The approach was demonstrated using models and literature was provided  Verbal and written consent was obtained  My impressions and treatment recommendations were discussed in detail with the patient who verbalized understanding and had no further questions  Discharge instructions were provided  I personally saw and examined the patient and I agree with the above discussed plan of care      New Medications Ordered This Visit   Medications   • Glucosamine-Chondroitin--200-150 MG TABS   • lidocaine (XYLOCAINE) 1 %     Si mL   • sodium hyaluronate (Durolane) 60 MG/3ML injection     Sig: 3 mL   • triamcinolone acetonide (KENALOG-40) 40 mg/mL     Si mL       History of Present Illness    Greater than 90% relief of pain with improved ability to participate with IADLs after bilateral L3, L4, L5 medial branch blocks #1 and #2  s/p RFA 22  Prior to this described axial low back pain described primarily arthritic features  Positive facet loading maneuvers consistent with prominent lumbar facet arthropathy noted on MRI lumbar spine  Previously reported the following symptomatology:     Gertrude Armendariz is a 45 y o  female  With past medical history of hypercholesterolemia presenting with 12 week history of lumbar radicular pain in right L4 and L5 dermatomal distribution accompanied by weakness numbness and paresthesias  The patient rates the pain at a 8/10 accompanied by electric shock-like shooting features and crampy burning pain in the aforementioned dermatomal distributions  She additionally notes symptomatology of aching, nagging, indolent, stabbing, throbbing features of the pain in her back, symptomatic of arthritic like low back pain  The pain is worse in the mornings as well as the end of the day; exertion such as walking for long periods of time seems to exacerbate the pain  The patient can hardly walk more than a few blocks without having debilitating pain  She tries to maintain an active lifestyle and finds that the current degree of pain seems to compromises her efforts  The pain significantly impacts independent activities of daily living and contributes to significant disability  She has attempted a 1 month course of physical therapy with moderate relief of the pain  She has taken ibuprofen as well as flexeril with limited relief of the pain as well    She has never tried epidural steroid injections in the past  She denies any bowel or bladder dysfunction/incontinence    I have personally reviewed and/or updated the patient's past medical history, past surgical history, family history, social history, current medications, allergies, and vital signs today  Review of Systems   Constitutional: Positive for activity change  HENT: Negative  Eyes: Negative  Respiratory: Negative  Cardiovascular: Negative  Gastrointestinal: Negative  Endocrine: Negative  Genitourinary: Negative  Musculoskeletal: Positive for arthralgias, back pain, gait problem and myalgias  Skin: Negative  Allergic/Immunologic: Negative  Neurological: Positive for weakness and numbness  Hematological: Negative  Psychiatric/Behavioral: Negative  All other systems reviewed and are negative        Patient Active Problem List   Diagnosis   • Mixed hyperlipidemia   • Hypertension   • Anxiety   • Vitamin D deficiency   • Lumbar facet arthropathy   • Lumbar radiculopathy       Past Medical History:   Diagnosis Date   • Hyperlipidemia    • Hypertension        Past Surgical History:   Procedure Laterality Date   • BREAST BIOPSY Left 2009    core bx; benign    • BREAST BIOPSY Right 05/18/2022    US guided biopsy   • CHOLECYSTECTOMY  1995   • EPIDURAL BLOCK INJECTION Right 12/02/2021    Procedure: L4 and L5 TRANSFORAMINAL EPIDURAL STEROID INJECTION;  Surgeon: Charlie Rodrigues MD;  Location: [facility];  Service: Pain Management    • EPIDURAL BLOCK INJECTION N/A 12/28/2021    Procedure: BLOCK / INJECTION EPIDURAL STEROID LUMBAR L4-L5;  Surgeon: Charlie Rodrigues MD;  Location: [facility];  Service: Pain Management    • FL GUIDED NEEDLE PLAC BX/ASP/INJ  03/03/2022   • FL GUIDED NEEDLE PLAC BX/ASP/INJ  04/14/2022   • HYSTERECTOMY      age 26   • NERVE BLOCK Bilateral 02/15/2022    Procedure: BLOCK MEDIAL BRANCH L3, L4, L5 #1;  Surgeon: Charlie Rodrigues MD;  Location: [facility];  Service: Pain Management    • NERVE BLOCK Bilateral 03/03/2022    Procedure: BLOCK MEDIAL BRANCH L3, L4, L5 #2;  Surgeon: Charlie Rodrigues MD;  Location: [facility];  Service: Pain Management    • RHIZOTOMY Bilateral 04/14/2022    Procedure: RHIZOTOMY LUMBAR L3, L4, L5;  Surgeon: Mick Squires MD;  Location: [facility];  Service: Pain Management    • TOOTH EXTRACTION     • US GUIDED BREAST BIOPSY RIGHT COMPLETE Right 5/18/2022       Family History   Problem Relation Age of Onset   • Alzheimer's disease Mother    • Lung cancer Mother    • Heart disease Father         cardiac disorder   • Prostate cancer Father    • Breast cancer Sister 59   • No Known Problems Daughter    • Tongue cancer Maternal Grandmother    • Lung cancer Maternal Grandfather    • No Known Problems Paternal Grandmother    • Lung cancer Paternal Grandfather    • Emphysema Maternal Aunt    • Breast cancer Cousin 39   • No Known Problems Son    • No Known Problems Son    • No Known Problems Paternal Aunt    • No Known Problems Paternal Aunt    • No Known Problems Paternal Aunt        Social History     Occupational History   • Not on file   Tobacco Use   • Smoking status: Never Smoker   • Smokeless tobacco: Never Used   Vaping Use   • Vaping Use: Never used   Substance and Sexual Activity   • Alcohol use: Yes     Comment: social drinker   • Drug use: No   • Sexual activity: Yes     Partners: Male     Birth control/protection: None     Comment: hysterectomy       Current Outpatient Medications on File Prior to Visit   Medication Sig   • Calcium-Vitamin D-Vitamin K (VIACTIV PO) Take by mouth daily   • cetirizine (ZyrTEC) 10 MG chewable tablet Chew 10 mg daily   • Cholecalciferol 50 MCG (2000 UT) CAPS Take by mouth daily   • desvenlafaxine succinate (PRISTIQ) 50 mg 24 hr tablet Take 1 tablet (50 mg total) by mouth daily   • gabapentin (NEURONTIN) 300 mg capsule Take 1 capsule (300 mg total) by mouth 3 (three) times a day   • Glucosamine-Chondroitin--200-150 MG TABS    • ibuprofen (MOTRIN) 600 mg tablet Take 1 tablet (600 mg total) by mouth every 12 (twelve) hours as needed for moderate pain   • metoprolol succinate (TOPROL-XL) 50 mg 24 hr tablet TAKE 1 TABLET DAILY   • Multiple Vitamin (MULTIVITAMIN) tablet Take 1 tablet by mouth daily   • pantoprazole (PROTONIX) 40 mg tablet TAKE 1 TABLET DAILY   • rosuvastatin (CRESTOR) 10 MG tablet TAKE 1 TABLET DAILY   • spironolactone (ALDACTONE) 25 mg tablet TAKE 1 TABLET DAILY   • Zoster Vac Recomb Adjuvanted (Shingrix) 50 MCG/0 5ML SUSR 0 5mL IM for one dose, followed by 0 5mL IM 2-6 months after first dose   • benzonatate (TESSALON PERLES) 100 mg capsule Take 1 capsule (100 mg total) by mouth 3 (three) times a day as needed for cough (Patient not taking: Reported on 10/26/2022)   • lidocaine (XYLOCAINE) 1 % 2 mL (Patient not taking: Reported on 10/26/2022)   • sodium hyaluronate (Durolane) 60 MG/3ML injection 3 mL (Patient not taking: Reported on 10/26/2022)   • triamcinolone acetonide (KENALOG-40) 40 mg/mL 1 mL (Patient not taking: Reported on 10/26/2022)     No current facility-administered medications on file prior to visit  Allergies   Allergen Reactions   • Codeine Other (See Comments)     "throat swells"         Physical Exam    /82   Pulse 69   Temp 97 5 °F (36 4 °C)   Resp 20   Ht 5' 8" (1 727 m)   Wt 90 1 kg (198 lb 9 6 oz)   BMI 30 20 kg/m²     Constitutional: normal, well developed, well nourished, alert, in no distress and non-toxic and no overt pain behavior  and overweight  Eyes: anicteric  HEENT: grossly intact  Neck: supple, symmetric, trachea midline and no masses   Pulmonary:even and unlabored  Cardiovascular:No edema or pitting edema present  Skin:Normal without rashes or lesions and well hydrated  Psychiatric:Mood and affect appropriate  Neurologic:Cranial Nerves II-XII grossly intact Sensation grossly intact; no clonus negative bethea's  Reflexes 2+ and brisk  SLR weakly positive right sided  Musculoskeletal:normal gait  Mild weakness with right sided hip flexion, knee extension and right ehl extension   Otherwise 5/5 strength in all other active range of motion movements b/l  Normal heel toe and tip toe walking  Significant pain with lumbar facet loading bilaterally and with lateral spine rotation  ttp over lumbar paraspinal muscles  Negative deo's test, negative gaenslen's negative SIJ loading bilaterally  Imaging    MRI LUMBAR SPINE WITHOUT CONTRAST     INDICATION: [id number]: Radiculopathy, lumbar region  8 weeks of right-sided lumbar radiculopathy  Acute intractable low back pain  Status post 6 weeks of physical therapy  Leg weakness  Low back pain      COMPARISON:  8/17/2021     TECHNIQUE:  Sagittal T1, sagittal T2, sagittal inversion recovery, axial T1 and axial T2, coronal T2     IMAGE QUALITY:  Diagnostic     FINDINGS:     VERTEBRAL BODIES:  There are 5 lumbar type vertebral bodies  Normal alignment of the lumbar spine  No spondylolysis or spondylolisthesis  No scoliosis  No compression fracture  Scattered degenerative endplate changes  No focally suspicious marrow   lesions  No bone marrow edema or compression abnormality  Type II Modic endplate changes V20-X8  Hemangioma T12 vertebra  Small hemangioma centrally in the L4 vertebra      SACRUM:  Normal signal within the sacrum  No evidence of insufficiency or stress fracture      DISTAL CORD AND CONUS:  Normal size and signal within the distal cord and conus  The conus medullaris terminates at the superior endplate of L2      PARASPINAL SOFT TISSUES:  Paraspinal soft tissues are unremarkable      LOWER THORACIC DISC SPACES:  Mild noncompressive lower thoracic degenerative change      LUMBAR DISC SPACES:     L1-L2:  Normal      L2-L3:  Normal      L3-L4:  There is a left neural foraminal disc herniation, protrusion type  Mild left neural foraminal narrowing  Central canal and right neural foramen patent      L4-L5:  There is a diffuse disk bulge  No significant central canal or neural foraminal narrowing    Bilateral facet hypertrophy noted      L5-S1:  There is a diffuse disk bulge  No significant central canal or neural foraminal narrowing  Bilateral facet hypertrophy noted      IMPRESSION:     Mild noncompressive lumbar degenerative change

## 2022-10-26 NOTE — H&P (VIEW-ONLY)
Assessment  1  Lumbar facet arthropathy    Greater than 90% relief of pain with improved ability to participate with IADLs after bilateral L3, L4, L5 medial branch blocks #1 and #2  2 weeks s/p RFA 4/14/22; Axial low back pain described by primarily arthritic features as returned after 6 months of relief  Positive facet loading maneuvers consistent with prominent lumbar facet arthropathy noted on MRI lumbar spine  Previously reported the following symptomatology:     MRI findings show mild noncompressive lumbar degenerative Disc disease with lumbar facet arthropathy noted at lower lumbar levels  Mild weakness with right hip flexion, knee extension and right ankle dorsiflexion  Additionally has pain with bilateral facet loading maneuvers and lumbar spine as well as with axial rotation  Reports symptomatology of lumbar radiculopathy in right L4 and L5 dermatomal distributions accompanied by weakness numbness and paresthesias at times  Physical therapy has improved her symptoms to a moderate extent  Reasonable to proceed with right L4 and L5 TFESI to target radicular pain at this time as part of multimodal pain therapy plan  If symptomatology lumbar facet arthropathy are more prevalent, we may proceed with medial branch blocks  All interventions including epidural steroid injections, medial branch blocks and subsequent radiofrequency ablation if successful were thoroughly discussed with patient  Handouts provided and questions answered to patient's satisfaction  Plan  -Repeat RF lesioning of medial branch nerves bilateral L3,L4, L5 with IV sedation  -gabapentin increased to 300 mg t i d  Ordered for patient; may taper counseled regarding sedative effects of taking this medication and provided up titration calendar    Counseled not to take medication while driving or operating heavy machinery/using stairs  -Ibuprofen 600 mg t i d  prn pain previously prescribed; may taper Patient educated regarding bleeding risk of taking this medication not taking any other nonsteroidal anti-inflammatory medications while taking this medication; counseled thoroughly regarding potential risk of Cardiovascular injury, Kidney injury, Gastrointestinal ulceration/bleeding  Patient voiced understanding; gi ppx additionally written; kidney labs checked this year  -physical therapy for right-sided lumbar radiculopathy, facet arthropathy; Core exercises additionally provided for physician directed home PT that the patient plans to participate with for 1 hour, twice a week for the next 6 weeks  There are risks associated with opioid medications, including dependence, addiction and tolerance  The patient understands and agrees to use these medications only as prescribed  Potential side effects of the medications include, but are not limited to, constipation, drowsiness, addiction, impaired judgment and risk of fatal overdose if not taken as prescribed  The patient was warned against driving while taking sedation medications  Sharing medications is a felony  At this point in time, the patient is showing no signs of addiction, abuse, diversion or suicidal ideation  [place] Prescription Drug Monitoring Program report was reviewed and was appropriate     Complete risks and benefits including bleeding, infection, tissue reaction, nerve injury and allergic reaction were discussed  The approach was demonstrated using models and literature was provided  Verbal and written consent was obtained  My impressions and treatment recommendations were discussed in detail with the patient who verbalized understanding and had no further questions  Discharge instructions were provided  I personally saw and examined the patient and I agree with the above discussed plan of care      New Medications Ordered This Visit   Medications   • Glucosamine-Chondroitin--200-150 MG TABS   • lidocaine (XYLOCAINE) 1 %     Si mL   • sodium hyaluronate (Durolane) 60 MG/3ML injection     Sig: 3 mL   • triamcinolone acetonide (KENALOG-40) 40 mg/mL     Si mL       History of Present Illness    Greater than 90% relief of pain with improved ability to participate with IADLs after bilateral L3, L4, L5 medial branch blocks #1 and #2  s/p RFA 22  Prior to this described axial low back pain described primarily arthritic features  Positive facet loading maneuvers consistent with prominent lumbar facet arthropathy noted on MRI lumbar spine  Previously reported the following symptomatology:     Liset Youngblood is a 45 y o  female  With past medical history of hypercholesterolemia presenting with 12 week history of lumbar radicular pain in right L4 and L5 dermatomal distribution accompanied by weakness numbness and paresthesias  The patient rates the pain at a 8/10 accompanied by electric shock-like shooting features and crampy burning pain in the aforementioned dermatomal distributions  She additionally notes symptomatology of aching, nagging, indolent, stabbing, throbbing features of the pain in her back, symptomatic of arthritic like low back pain  The pain is worse in the mornings as well as the end of the day; exertion such as walking for long periods of time seems to exacerbate the pain  The patient can hardly walk more than a few blocks without having debilitating pain  She tries to maintain an active lifestyle and finds that the current degree of pain seems to compromises her efforts  The pain significantly impacts independent activities of daily living and contributes to significant disability  She has attempted a 1 month course of physical therapy with moderate relief of the pain  She has taken ibuprofen as well as flexeril with limited relief of the pain as well    She has never tried epidural steroid injections in the past  She denies any bowel or bladder dysfunction/incontinence    I have personally reviewed and/or updated the patient's past medical history, past surgical history, family history, social history, current medications, allergies, and vital signs today  Review of Systems   Constitutional: Positive for activity change  HENT: Negative  Eyes: Negative  Respiratory: Negative  Cardiovascular: Negative  Gastrointestinal: Negative  Endocrine: Negative  Genitourinary: Negative  Musculoskeletal: Positive for arthralgias, back pain, gait problem and myalgias  Skin: Negative  Allergic/Immunologic: Negative  Neurological: Positive for weakness and numbness  Hematological: Negative  Psychiatric/Behavioral: Negative  All other systems reviewed and are negative        Patient Active Problem List   Diagnosis   • Mixed hyperlipidemia   • Hypertension   • Anxiety   • Vitamin D deficiency   • Lumbar facet arthropathy   • Lumbar radiculopathy       Past Medical History:   Diagnosis Date   • Hyperlipidemia    • Hypertension        Past Surgical History:   Procedure Laterality Date   • BREAST BIOPSY Left 2009    core bx; benign    • BREAST BIOPSY Right 05/18/2022    US guided biopsy   • CHOLECYSTECTOMY  1995   • EPIDURAL BLOCK INJECTION Right 12/02/2021    Procedure: L4 and L5 TRANSFORAMINAL EPIDURAL STEROID INJECTION;  Surgeon: Walden Buerger, MD;  Location: [facility];  Service: Pain Management    • EPIDURAL BLOCK INJECTION N/A 12/28/2021    Procedure: BLOCK / INJECTION EPIDURAL STEROID LUMBAR L4-L5;  Surgeon: Walden Buerger, MD;  Location: [facility];  Service: Pain Management    • FL GUIDED NEEDLE PLAC BX/ASP/INJ  03/03/2022   • FL GUIDED NEEDLE PLAC BX/ASP/INJ  04/14/2022   • HYSTERECTOMY      age 26   • NERVE BLOCK Bilateral 02/15/2022    Procedure: BLOCK MEDIAL BRANCH L3, L4, L5 #1;  Surgeon: Walden Buerger, MD;  Location: [facility];  Service: Pain Management    • NERVE BLOCK Bilateral 03/03/2022    Procedure: BLOCK MEDIAL BRANCH L3, L4, L5 #2;  Surgeon: Walden Buerger, MD;  Location: [facility];  Service: Pain Management    • RHIZOTOMY Bilateral 04/14/2022    Procedure: RHIZOTOMY LUMBAR L3, L4, L5;  Surgeon: Smita Caraballo MD;  Location:  [facility];  Service: Pain Management    • TOOTH EXTRACTION     • US GUIDED BREAST BIOPSY RIGHT COMPLETE Right 5/18/2022       Family History   Problem Relation Age of Onset   • Alzheimer's disease Mother    • Lung cancer Mother    • Heart disease Father         cardiac disorder   • Prostate cancer Father    • Breast cancer Sister 59   • No Known Problems Daughter    • Tongue cancer Maternal Grandmother    • Lung cancer Maternal Grandfather    • No Known Problems Paternal Grandmother    • Lung cancer Paternal Grandfather    • Emphysema Maternal Aunt    • Breast cancer Cousin 39   • No Known Problems Son    • No Known Problems Son    • No Known Problems Paternal Aunt    • No Known Problems Paternal Aunt    • No Known Problems Paternal Aunt        Social History     Occupational History   • Not on file   Tobacco Use   • Smoking status: Never Smoker   • Smokeless tobacco: Never Used   Vaping Use   • Vaping Use: Never used   Substance and Sexual Activity   • Alcohol use: Yes     Comment: social drinker   • Drug use: No   • Sexual activity: Yes     Partners: Male     Birth control/protection: None     Comment: hysterectomy       Current Outpatient Medications on File Prior to Visit   Medication Sig   • Calcium-Vitamin D-Vitamin K (VIACTIV PO) Take by mouth daily   • cetirizine (ZyrTEC) 10 MG chewable tablet Chew 10 mg daily   • Cholecalciferol 50 MCG (2000 UT) CAPS Take by mouth daily   • desvenlafaxine succinate (PRISTIQ) 50 mg 24 hr tablet Take 1 tablet (50 mg total) by mouth daily   • gabapentin (NEURONTIN) 300 mg capsule Take 1 capsule (300 mg total) by mouth 3 (three) times a day   • Glucosamine-Chondroitin--200-150 MG TABS    • ibuprofen (MOTRIN) 600 mg tablet Take 1 tablet (600 mg total) by mouth every 12 (twelve) hours as needed for moderate pain   • metoprolol succinate (TOPROL-XL) 50 mg 24 hr tablet TAKE 1 TABLET DAILY   • Multiple Vitamin (MULTIVITAMIN) tablet Take 1 tablet by mouth daily   • pantoprazole (PROTONIX) 40 mg tablet TAKE 1 TABLET DAILY   • rosuvastatin (CRESTOR) 10 MG tablet TAKE 1 TABLET DAILY   • spironolactone (ALDACTONE) 25 mg tablet TAKE 1 TABLET DAILY   • Zoster Vac Recomb Adjuvanted (Shingrix) 50 MCG/0 5ML SUSR 0 5mL IM for one dose, followed by 0 5mL IM 2-6 months after first dose   • benzonatate (TESSALON PERLES) 100 mg capsule Take 1 capsule (100 mg total) by mouth 3 (three) times a day as needed for cough (Patient not taking: Reported on 10/26/2022)   • lidocaine (XYLOCAINE) 1 % 2 mL (Patient not taking: Reported on 10/26/2022)   • sodium hyaluronate (Durolane) 60 MG/3ML injection 3 mL (Patient not taking: Reported on 10/26/2022)   • triamcinolone acetonide (KENALOG-40) 40 mg/mL 1 mL (Patient not taking: Reported on 10/26/2022)     No current facility-administered medications on file prior to visit  Allergies   Allergen Reactions   • Codeine Other (See Comments)     "throat swells"         Physical Exam    /82   Pulse 69   Temp 97 5 °F (36 4 °C)   Resp 20   Ht 5' 8" (1 727 m)   Wt 90 1 kg (198 lb 9 6 oz)   BMI 30 20 kg/m²     Constitutional: normal, well developed, well nourished, alert, in no distress and non-toxic and no overt pain behavior  and overweight  Eyes: anicteric  HEENT: grossly intact  Neck: supple, symmetric, trachea midline and no masses   Pulmonary:even and unlabored  Cardiovascular:No edema or pitting edema present  Skin:Normal without rashes or lesions and well hydrated  Psychiatric:Mood and affect appropriate  Neurologic:Cranial Nerves II-XII grossly intact Sensation grossly intact; no clonus negative bethea's  Reflexes 2+ and brisk  SLR weakly positive right sided  Musculoskeletal:normal gait  Mild weakness with right sided hip flexion, knee extension and right ehl extension   Otherwise 5/5 strength in all other active range of motion movements b/l  Normal heel toe and tip toe walking  Significant pain with lumbar facet loading bilaterally and with lateral spine rotation  ttp over lumbar paraspinal muscles  Negative deo's test, negative gaenslen's negative SIJ loading bilaterally  Imaging    MRI LUMBAR SPINE WITHOUT CONTRAST     INDICATION: M54 16: Radiculopathy, lumbar region  8 weeks of right-sided lumbar radiculopathy  Acute intractable low back pain  Status post 6 weeks of physical therapy  Leg weakness  Low back pain      COMPARISON:  8/17/2021     TECHNIQUE:  Sagittal T1, sagittal T2, sagittal inversion recovery, axial T1 and axial T2, coronal T2     IMAGE QUALITY:  Diagnostic     FINDINGS:     VERTEBRAL BODIES:  There are 5 lumbar type vertebral bodies  Normal alignment of the lumbar spine  No spondylolysis or spondylolisthesis  No scoliosis  No compression fracture  Scattered degenerative endplate changes  No focally suspicious marrow   lesions  No bone marrow edema or compression abnormality  Type II Modic endplate changes [id number]  Hemangioma T12 vertebra  Small hemangioma centrally in the L4 vertebra      SACRUM:  Normal signal within the sacrum  No evidence of insufficiency or stress fracture      DISTAL CORD AND CONUS:  Normal size and signal within the distal cord and conus  The conus medullaris terminates at the superior endplate of L2      PARASPINAL SOFT TISSUES:  Paraspinal soft tissues are unremarkable      LOWER THORACIC DISC SPACES:  Mild noncompressive lower thoracic degenerative change      LUMBAR DISC SPACES:     L1-L2:  Normal      L2-L3:  Normal      L3-L4:  There is a left neural foraminal disc herniation, protrusion type  Mild left neural foraminal narrowing  Central canal and right neural foramen patent      L4-L5:  There is a diffuse disk bulge  No significant central canal or neural foraminal narrowing    Bilateral facet hypertrophy noted      L5-S1:  There is a diffuse disk bulge  No significant central canal or neural foraminal narrowing  Bilateral facet hypertrophy noted      IMPRESSION:     Mild noncompressive lumbar degenerative change

## 2022-10-26 NOTE — PATIENT INSTRUCTIONS
Core Strengthening Exercises   WHAT YOU NEED TO KNOW:   Your core includes the muscles of your lower back, hip, pelvis, and abdomen  Core strengthening exercises help heal and strengthen these muscles  This helps prevent another injury, and keeps your pelvis, spine, and hips in the correct position  DISCHARGE INSTRUCTIONS:   Contact your healthcare provider if:   You have sharp or worsening pain during exercise or at rest     You have questions or concerns about your shoulder exercises  Safety tips:  Talk to your healthcare provider before you start an exercise program  A physical therapist can teach you how to do core strengthening exercises safely  Do the exercises on a mat or firm surface  A firm surface will support your spine and prevent low back pain  Do not do these exercises on a bed  Move slowly and smoothly  Avoid fast or jerky motions  Stop if you feel pain  Core exercises should not be painful  Stop if you feel pain  Breathe normally during core exercises  Do not hold your breath  This may cause an increase in blood pressure and prevent muscle strengthening  Your healthcare provider will tell you when to inhale and exhale during the exercise  Begin all of your exercises with abdominal bracing  Abdominal bracing helps warm up your core muscles  You can also practice abdominal bracing throughout the day  Lie on your back with your knees bent and feet flat on the floor  Place your arms in a relaxed position beside your body  Tighten your abdominal muscles  Pull your belly button in and up toward your spine  Hold for 5 seconds  Relax your muscles  Repeat 10 times  Core strengthening exercises: Your healthcare provider will tell you how often to do these exercises  The provider will also tell you how many repetitions of each exercise you should do  Hold each exercise for 5 seconds or as directed  As you get stronger, increase your hold to 10 to 15 seconds   You can do some of these exercises on a stability ball, or with a weight  Ask your healthcare provider how to use a stability ball or weight for these exercises:  Bridging:  Lie on your back with your knees bent and feet flat on the floor  Rest your arms at your side  Tighten your buttocks, and then lift your hips 1 inch off the floor  Hold for 5 seconds  When you can do this exercise without pain for 10 seconds, increase the distance you lift your hips  A good goal is to be able to lift your hips so that your shoulders, hips, and knees are in a straight line  Dead bug:  Lie on your back with your knees bent and feet flat on the floor  Place your arms in a relaxed position beside your body  Begin with abdominal bracing  Next, raise one leg, keeping your knee bent  Hold for 5 seconds  Repeat with the other leg  When you can do this exercise without pain for 10 to 15 seconds, you may raise one straight leg and hold  Repeat with the other leg  Quadruped:  Place your hands and knees on the floor  Keep your wrists directly below your shoulders and your knees directly below your hips  Pull your belly button in toward your spine  Do not flatten or arch your back  Tighten your abdominal muscles below your belly button  Hold for 5 seconds  When you can do this exercise without pain for 10 to 15 seconds, you may extend one arm and hold  Repeat on the other side  Side bridge exercises:      Standing side bridge:  Stand next to a wall and extend one arm toward the wall  Place your palm flat on the wall with your fingers pointing upward  Begin with abdominal bracing  Next, without moving your feet, slowly bend your arm to 90 degrees  Hold for 5 seconds  Repeat on the other side  When you can do this exercise without pain for 10 to 15 seconds, you may do the bent leg side bridge on the floor  Bent leg side bridge:  Lie on one side with your legs, hips, and shoulders in a straight line   Prop yourself up onto your forearm so your elbow is directly below your shoulder  Bend your knees back to 90 degrees  Begin with abdominal bracing  Next, lift your hips and balance yourself on your forearm and knees  Hold for 5 seconds  Repeat on the other side  When you can do this exercise without pain for 10 to 15 seconds, you may do the straight leg side bridge on the floor  Straight leg side bridge:  Lie on one side with your legs, hips, and shoulders in a straight line  Prop yourself up onto your forearm so your elbow is directly below your shoulder  Begin with abdominal bracing  Lift your hips off the floor and balance yourself on your forearm and the outside of your flexed foot  Do not let your ankle bend sideways  Hold for 5 seconds  Repeat on the other side  When you can do this exercise without pain for 10 to 15 seconds, ask your healthcare provider for more advanced exercises  Superman:  Lie on your stomach  Extend your arms forward on the floor  Tighten your abdominal muscles and lift your right hand and left leg off the floor  Hold this position  Slowly return to the starting position  Tighten your abdominal muscles and lift your left hand and right leg off the floor  Hold this position  Slowly return to the starting position  Clam:  Lie on your side with your knees bent  Put your bottom arm under your head to keep your neck in line  Put your top hand on your hip to keep your pelvis from moving  Put your heels together, and keep them together during this exercise  Slowly raise your top knee toward the ceiling  Then lower your leg so your knees are together  Repeat this exercise 10 times  Then switch sides and do the exercise 10 times with the other leg  Curl up:  Lie on your back with your knees bent and feet flat on the floor  Place your hands, palms down, underneath your lower back  Next, with your elbows on the floor, lift your shoulders and chest 2 to 3 inches off the floor   Keep your head in line with your shoulders  Hold this position  Slowly return to the starting position  Straight leg raises:  Lie on your back with one leg straight  Bend the other knee and place your foot flat on the floor  Tighten your abdominal muscles  Keep your leg straight and slowly lift it straight up 6 to 12 inches off the floor  Hold this position  Lower your leg slowly  Do as many repetitions as directed on this side  Repeat with the other leg  Plank:  Lie on your stomach  Bend your elbows and place your forearms flat on the floor  Lift your chest, stomach, and knees off the floor  Make sure your elbows are below your shoulders  Your body should be in a straight line  Do not let your hips or lower back sink to the ground  Squeeze your abdominal muscles together and hold for 15 seconds  To make this exercise harder, hold for 30 seconds or lift 1 leg at a time  Bicycles:  Lie on your back  Bend both knees and bring them toward your chest  Your calves should be parallel to the floor  Place the palms of your hands on the back of your head  Straighten your right leg and keep it lifted 2 inches off the floor  Raise your head and shoulders off the floor and twist towards your left  Keep your head and shoulders lifted  Bend your right knee while you straighten your left leg  Keep your left leg 2 inches off the floor  Twist your head and chest towards the left leg  Continue to straighten 1 leg at a time and twist        Follow up with your doctor as directed:  Write down your questions so you remember to ask them during your visits  © Copyright [employer] 2022 Information is for End User's use only and may not be sold, redistributed or otherwise used for commercial purposes  All illustrations and images included in CareNotes® are the copyrighted property of [employer]  or [facility] Laurie Tirado   The above information is an  only   It is not intended as medical advice for individual conditions or treatments  Talk to your doctor, nurse or pharmacist before following any medical regimen to see if it is safe and effective for you

## 2022-10-27 ENCOUNTER — TELEPHONE (OUTPATIENT)
Dept: PAIN MEDICINE | Facility: CLINIC | Age: 52
End: 2022-10-27

## 2022-10-27 NOTE — TELEPHONE ENCOUNTER
S/w pt  Scheduled B/L L3-L5 RFA w/ IV sed 11/15/2022  Pt aware of instructions  Will call with questions

## 2022-11-15 ENCOUNTER — HOSPITAL ENCOUNTER (OUTPATIENT)
Facility: HOSPITAL | Age: 52
Setting detail: OUTPATIENT SURGERY
Discharge: HOME | End: 2022-11-15
Attending: ANESTHESIOLOGY | Admitting: ANESTHESIOLOGY

## 2022-11-15 ENCOUNTER — ANESTHESIA (OUTPATIENT)
Dept: GASTROENTEROLOGY | Facility: HOSPITAL | Age: 52
End: 2022-11-15

## 2022-11-15 ENCOUNTER — TELEPHONE (OUTPATIENT)
Dept: RADIOLOGY | Facility: CLINIC | Age: 52
End: 2022-11-15

## 2022-11-15 ENCOUNTER — APPOINTMENT (OUTPATIENT)
Dept: RADIOLOGY | Facility: HOSPITAL | Age: 52
End: 2022-11-15

## 2022-11-15 ENCOUNTER — ANESTHESIA EVENT (OUTPATIENT)
Dept: GASTROENTEROLOGY | Facility: HOSPITAL | Age: 52
End: 2022-11-15

## 2022-11-15 VITALS
WEIGHT: 198 LBS | SYSTOLIC BLOOD PRESSURE: 115 MMHG | BODY MASS INDEX: 30.01 KG/M2 | RESPIRATION RATE: 18 BRPM | HEART RATE: 84 BPM | HEIGHT: 68 IN | TEMPERATURE: 97 F | DIASTOLIC BLOOD PRESSURE: 60 MMHG | OXYGEN SATURATION: 98 %

## 2022-11-15 RX ORDER — FENTANYL CITRATE/PF 50 MCG/ML
25 SYRINGE (ML) INJECTION
Status: DISCONTINUED | OUTPATIENT
Start: 2022-11-15 | End: 2022-11-15 | Stop reason: HOSPADM

## 2022-11-15 RX ORDER — LIDOCAINE HYDROCHLORIDE 10 MG/ML
INJECTION, SOLUTION EPIDURAL; INFILTRATION; INTRACAUDAL; PERINEURAL AS NEEDED
Status: DISCONTINUED | OUTPATIENT
Start: 2022-11-15 | End: 2022-11-15 | Stop reason: HOSPADM

## 2022-11-15 RX ORDER — BUPIVACAINE HYDROCHLORIDE 2.5 MG/ML
INJECTION, SOLUTION EPIDURAL; INFILTRATION; INTRACAUDAL AS NEEDED
Status: DISCONTINUED | OUTPATIENT
Start: 2022-11-15 | End: 2022-11-15 | Stop reason: HOSPADM

## 2022-11-15 RX ORDER — LIDOCAINE HYDROCHLORIDE 10 MG/ML
INJECTION, SOLUTION EPIDURAL; INFILTRATION; INTRACAUDAL; PERINEURAL AS NEEDED
Status: DISCONTINUED | OUTPATIENT
Start: 2022-11-15 | End: 2022-11-15

## 2022-11-15 RX ORDER — BUPIVACAINE HCL/PF 2.5 MG/ML
5 VIAL (ML) INJECTION ONCE
Status: DISCONTINUED | OUTPATIENT
Start: 2022-11-15 | End: 2022-11-15 | Stop reason: HOSPADM

## 2022-11-15 RX ORDER — PROPOFOL 10 MG/ML
INJECTION, EMULSION INTRAVENOUS AS NEEDED
Status: DISCONTINUED | OUTPATIENT
Start: 2022-11-15 | End: 2022-11-15

## 2022-11-15 RX ORDER — SODIUM CHLORIDE, SODIUM LACTATE, POTASSIUM CHLORIDE, CALCIUM CHLORIDE 600; 310; 30; 20 MG/100ML; MG/100ML; MG/100ML; MG/100ML
INJECTION, SOLUTION INTRAVENOUS CONTINUOUS PRN
Status: DISCONTINUED | OUTPATIENT
Start: 2022-11-15 | End: 2022-11-15

## 2022-11-15 RX ORDER — FENTANYL CITRATE 50 UG/ML
INJECTION, SOLUTION INTRAMUSCULAR; INTRAVENOUS AS NEEDED
Status: DISCONTINUED | OUTPATIENT
Start: 2022-11-15 | End: 2022-11-15

## 2022-11-15 RX ORDER — METHYLPREDNISOLONE ACETATE 80 MG/ML
80 INJECTION, SUSPENSION INTRA-ARTICULAR; INTRALESIONAL; INTRAMUSCULAR; PARENTERAL; SOFT TISSUE ONCE
Status: DISCONTINUED | OUTPATIENT
Start: 2022-11-15 | End: 2022-11-15 | Stop reason: HOSPADM

## 2022-11-15 RX ORDER — MIDAZOLAM HYDROCHLORIDE 2 MG/2ML
INJECTION, SOLUTION INTRAMUSCULAR; INTRAVENOUS AS NEEDED
Status: DISCONTINUED | OUTPATIENT
Start: 2022-11-15 | End: 2022-11-15

## 2022-11-15 RX ORDER — METHYLPREDNISOLONE ACETATE 80 MG/ML
INJECTION, SUSPENSION INTRA-ARTICULAR; INTRALESIONAL; INTRAMUSCULAR; SOFT TISSUE AS NEEDED
Status: DISCONTINUED | OUTPATIENT
Start: 2022-11-15 | End: 2022-11-15 | Stop reason: HOSPADM

## 2022-11-15 RX ORDER — ONDANSETRON 2 MG/ML
4 INJECTION INTRAMUSCULAR; INTRAVENOUS ONCE AS NEEDED
Status: DISCONTINUED | OUTPATIENT
Start: 2022-11-15 | End: 2022-11-15 | Stop reason: HOSPADM

## 2022-11-15 RX ORDER — LIDOCAINE HYDROCHLORIDE 10 MG/ML
10 INJECTION, SOLUTION EPIDURAL; INFILTRATION; INTRACAUDAL; PERINEURAL ONCE
Status: DISCONTINUED | OUTPATIENT
Start: 2022-11-15 | End: 2022-11-15 | Stop reason: HOSPADM

## 2022-11-15 RX ORDER — SODIUM CHLORIDE, SODIUM LACTATE, POTASSIUM CHLORIDE, CALCIUM CHLORIDE 600; 310; 30; 20 MG/100ML; MG/100ML; MG/100ML; MG/100ML
50 INJECTION, SOLUTION INTRAVENOUS CONTINUOUS
Status: DISCONTINUED | OUTPATIENT
Start: 2022-11-15 | End: 2022-11-15 | Stop reason: HOSPADM

## 2022-11-15 RX ORDER — PROPOFOL 10 MG/ML
INJECTION, EMULSION INTRAVENOUS CONTINUOUS PRN
Status: DISCONTINUED | OUTPATIENT
Start: 2022-11-15 | End: 2022-11-15

## 2022-11-15 RX ADMIN — PROPOFOL 50 MG: 10 INJECTION, EMULSION INTRAVENOUS at 08:37

## 2022-11-15 RX ADMIN — PROPOFOL 150 MCG/KG/MIN: 10 INJECTION, EMULSION INTRAVENOUS at 08:37

## 2022-11-15 RX ADMIN — MIDAZOLAM 2 MG: 1 INJECTION INTRAMUSCULAR; INTRAVENOUS at 08:36

## 2022-11-15 RX ADMIN — FENTANYL CITRATE 50 MCG: 50 INJECTION INTRAMUSCULAR; INTRAVENOUS at 08:37

## 2022-11-15 RX ADMIN — LIDOCAINE HYDROCHLORIDE 50 MG: 10 INJECTION, SOLUTION EPIDURAL; INFILTRATION; INTRACAUDAL; PERINEURAL at 08:37

## 2022-11-15 RX ADMIN — SODIUM CHLORIDE, SODIUM LACTATE, POTASSIUM CHLORIDE, AND CALCIUM CHLORIDE: .6; .31; .03; .02 INJECTION, SOLUTION INTRAVENOUS at 07:41

## 2022-11-15 NOTE — PROCEDURES
Pre-procedure Diagnosis: Lumbar facet arthropathy  Post-procedure Diagnosis: Lumbar facet arthropathy  Operation Title(s):  1  Radiofrequency ablation of [BILATERAL] L3, L4, L5 medial branch nerves      2  Intraoperative fluoroscopy  Attending Surgeon:   Maggie Tran MD  Anesthesia:   Local    Indications: The patient is a 45y o  year-old female with a diagnosis of lumbar facet arthropathy  The patient's history and physical exam were reviewed  The patient has previously undergone diagnostic and confirmatory lumbar medial branch blocks  Fluoroscopy is being used for the precise placement of the needles at the lumbar medial branch nerves  The risks, benefits and alternatives to the procedure were discussed, and all questions were answered to the patient's satisfaction  The patient agreed to proceed, and written informed consent was obtained  Procedure in Detail: The patient was brought into the procedure room and placed in the prone position on the fluoroscopy table  The low back and upper buttock were prepped with chloraprep times two and draped in a sterile manner  AP fluoroscopy was used to identify the lumbar levels on the [LEFT] side  The fluoroscope beam was then obliqued to better visualize the junction of the superior articular process and transverse process on the [LEFT] side and then tilted caudally about 25 degrees  An 18-gauge, 100mm length, 10mm curved active tip radiofrequency probe was advanced toward the targeted points until bone was contacted  Multiple fluoroscopic views were made to ensure placement of the needle tip at the appropriate location of the medial branch nerve  Motor stimulation was performed at 2 Hz and 1 2 volts generating a twitch in the paraspinal muscles with no motor activity in the lower extremities  Next, AP fluoroscopy was used to identify the L5-S1 level  The fluoroscope been was tilted cephalad to visualize the sacral ala   The fluoroscope beam was then tilted about 45 degrees from that point and the skin and subcutaneous tissues in these identified areas were anesthetized with 1% lidocaine  An 18-gauge, 100mm length, 10mm curved active tip radiofrequency probe was advanced toward the targeted points until bone was contacted  Motor stimulation was performed at 2 Hz and 1 2 volts generating a twitch in the paraspinal muscles with no motor activity in the lower extremities  0 5ml of 2% lidocaine was injected prior to lesioning, which was performed for 90 seconds at 80 degrees centigrade  The lesion was repeated once more after slight repositioning of the needles on an oblique view  Once the lesions were complete, 1ml of a solution consisting of 5mL 0 25% bupivacaine and 1 mL Depo-medrol (80mg/mL) was injected through each needle and then removed with a 1% lidocaine flush  The patient's back was cleaned, and bandages were placed at the needle insertion site  The same procedure was repeated at the lumbar levels on the [RIGHT] side    Disposition: The patient tolerated the procedure well and there were no apparent complications  Vital signs remained stable throughout the procedure  The patient was taken to the recovery area where written discharge instructions for the procedure were given      Estimated Blood Loss: None  Specimens Obtained: N/A

## 2022-11-15 NOTE — DISCHARGE INSTRUCTIONS
YOUR 2 WEEK FOLLOW UP HAS BEEN SCHEDULED; IF YOU WISH TO CHANGE THE FOLLOW UP, PLEASE CALL THE [facility] AT [facility]: [phone]    Lumbar Radiofrequency Ablation   WHAT YOU NEED TO KNOW:   Lumbar radiofrequency ablation (RFA) is a procedure used to treat facet joint pain in your lower back  Facet joints are found at the back of each vertebra  A needle electrode is used to send electrical currents to the nerves in your facet joint  The electrical currents create heat that damages the nerve so it cannot send pain signals  DISCHARGE INSTRUCTIONS:   Seek care immediately if:   You cannot move your leg  You cannot control your urine or bowel movements  You have severe pain in your lower back  Contact your healthcare provider if:   You have leg weakness  You develop new symptoms  You have questions or concerns about your condition or care  Medicines:   Pain medicine  may be given  Ask how to take this medicine safely  Take your medicine as directed  Contact your healthcare provider if you think your medicine is not helping or if you have side effects  Tell him or her if you are allergic to any medicine  Keep a list of the medicines, vitamins, and herbs you take  Include the amounts, and when and why you take them  Bring the list or the pill bottles to follow-up visits  Carry your medicine list with you in case of an emergency  Follow up with your healthcare provider as directed:  Write down your questions so you remember to ask them during your visits  Activity:  Do not drive a car or operate machinery within 24 hours after your procedure  Ask your healthcare provider about any other activities you should avoid  © Copyright [employer] 2022 Information is for End User's use only and may not be sold, redistributed or otherwise used for commercial purposes   All illustrations and images included in CareNotes® are the copyrighted property of [employer]  or [address]  The above information is an  only  It is not intended as medical advice for individual conditions or treatments  Talk to your doctor, nurse or pharmacist before following any medical regimen to see if it is safe and effective for you

## 2022-11-15 NOTE — OP NOTE
OPERATIVE REPORT  PATIENT NAME: Simone Terrazas    :  1970  MRN: [id number]  Pt Location:  GI ROOM 01    SURGERY DATE: 11/15/2022    Surgeon(s) and Role: Tisha Mcclure MD - Primary    Preop Diagnosis:  Lumbar facet arthropathy [M47 816]    Post-Op Diagnosis Codes:     * Lumbar facet arthropathy [M47 816]    Procedure(s) (LRB):  RHIZOTOMY LUMBAR L3, L4, L5 (Bilateral)    Specimen(s):  * No specimens in log *    Estimated Blood Loss:   Minimal    Drains:  * No LDAs found *    Anesthesia Type:   IV Sedation with Anesthesia    Operative Indications:  Lumbar facet arthropathy [M47 816]    Operative Findings:  Bilateral L3, L4, L5 medial branch nerve regions identified under fluoroscopic guidance   Appropriate motor testing performed prior to radiofrequency lesioning of medial branch nerve regions    Complications:   None    Procedure and Technique:  Please see detailed procedure note    I was present for the entire procedure    Patient Disposition:  PACU     SIGNATURE: Stephen Russo MD  DATE: November 15, 2022  TIME: 9:02 AM

## 2022-11-15 NOTE — ANESTHESIA POSTPROCEDURE EVALUATION
Post-Op Assessment Note    CV Status:  Stable    Pain management: adequate     Mental Status:  Sleepy   PONV Controlled:  None   Airway Patency:  Patent      Post Op Vitals Reviewed: Yes      Staff: Anesthesiologist   Reason for prolonged intubation > 24 hours:  N/aReason for prolonged intubation > 48 hours:  N/a      No complications documented      /70 (11/15/22 0904)    Temp (!) 97 °F (36 1 °C) (11/15/22 0859)    Pulse 75 (11/15/22 0904)   Resp 18 (11/15/22 0904)    SpO2 93 % (11/15/22 0904)

## 2022-11-15 NOTE — INTERVAL H&P NOTE
H&P reviewed  After examining the patient I find no changes in the patients condition since the H&P had been written      Vitals:    11/15/22 0735   BP: 118/70   Pulse: 70   Resp: 18   Temp: 97 9 °F (36 6 °C)   SpO2: 96%

## 2022-11-16 NOTE — TELEPHONE ENCOUNTER
S/w, states she is a little sore on her lower back, otherwise she feels great, brushing her teeth today and she manage doing it without a great deal  Pt verbalized appreciation for VS   Denies signs of infection  Denies sunburn sensation  Advised to use/ice for the tightness  Confirmed ov 11/28 at 1400

## 2022-11-28 ENCOUNTER — OFFICE VISIT (OUTPATIENT)
Dept: PAIN MEDICINE | Facility: CLINIC | Age: 52
End: 2022-11-28

## 2022-11-28 VITALS
BODY MASS INDEX: 30.04 KG/M2 | HEART RATE: 78 BPM | SYSTOLIC BLOOD PRESSURE: 140 MMHG | DIASTOLIC BLOOD PRESSURE: 82 MMHG | WEIGHT: 198.2 LBS | RESPIRATION RATE: 20 BRPM | TEMPERATURE: 97.3 F | HEIGHT: 68 IN

## 2022-11-28 DIAGNOSIS — M47.816 LUMBAR FACET ARTHROPATHY: Primary | ICD-10-CM

## 2022-11-28 NOTE — PROGRESS NOTES
Assessment  1  Lumbar facet arthropathy    Greater than 90% relief of pain with improved ability to participate with IADLs after bilateral L3, L4, L5 medial branch nerve RFA performed 11/15/22; prior RFA provided greater than 6 months of relief  Axial low back pain described by primarily arthritic features described previously  Positive facet loading maneuvers consistent with prominent lumbar facet arthropathy noted on MRI lumbar spine  Previously reported the following symptomatology:     MRI findings show mild noncompressive lumbar degenerative Disc disease with lumbar facet arthropathy noted at lower lumbar levels  Mild weakness with right hip flexion, knee extension and right ankle dorsiflexion  Additionally has pain with bilateral facet loading maneuvers and lumbar spine as well as with axial rotation  Reports symptomatology of lumbar radiculopathy in right L4 and L5 dermatomal distributions accompanied by weakness numbness and paresthesias at times  Physical therapy has improved her symptoms to a moderate extent  Reasonable to proceed with right L4 and L5 TFESI to target radicular pain at this time as part of multimodal pain therapy plan  If symptomatology lumbar facet arthropathy are more prevalent, we may proceed with medial branch blocks  All interventions including epidural steroid injections, medial branch blocks and subsequent radiofrequency ablation if successful were thoroughly discussed with patient  Handouts provided and questions answered to patient's satisfaction  Plan  -f/u prn  -gabapentin to be continued at 300 mg t i d  Ordered for patient; may taper counseled regarding sedative effects of taking this medication and provided up titration calendar    Counseled not to take medication while driving or operating heavy machinery/using stairs  -Ibuprofen 600 mg t i d  prn pain previously prescribed; may taper Patient educated regarding bleeding risk of taking this medication not taking any other nonsteroidal anti-inflammatory medications while taking this medication; counseled thoroughly regarding potential risk of Cardiovascular injury, Kidney injury, Gastrointestinal ulceration/bleeding  Patient voiced understanding; gi ppx additionally written; kidney labs checked this year  -physical therapy for right-sided lumbar radiculopathy, facet arthropathy; Core exercises additionally provided for physician directed home PT that the patient plans to participate with for 1 hour, twice a week for the next 6 weeks  There are risks associated with opioid medications, including dependence, addiction and tolerance  The patient understands and agrees to use these medications only as prescribed  Potential side effects of the medications include, but are not limited to, constipation, drowsiness, addiction, impaired judgment and risk of fatal overdose if not taken as prescribed  The patient was warned against driving while taking sedation medications  Sharing medications is a felony  At this point in time, the patient is showing no signs of addiction, abuse, diversion or suicidal ideation  [place] Prescription Drug Monitoring Program report was reviewed and was appropriate     Complete risks and benefits including bleeding, infection, tissue reaction, nerve injury and allergic reaction were discussed  The approach was demonstrated using models and literature was provided  Verbal and written consent was obtained  My impressions and treatment recommendations were discussed in detail with the patient who verbalized understanding and had no further questions  Discharge instructions were provided  I personally saw and examined the patient and I agree with the above discussed plan of care  No orders of the defined types were placed in this encounter        History of Present Illness    Greater than 90% relief of pain with improved ability to participate with IADLs after bilateral L3, L4, L5 medial branch nerve RFA performed 11/15/22; prior RFA provided greater than 6 months of relief  Axial low back pain described by primarily arthritic features described previously  Positive facet loading maneuvers consistent with prominent lumbar facet arthropathy noted on MRI lumbar spine  Previously reported the following symptomatology:     Toney Dillard is a 45 y o  female  With past medical history of hypercholesterolemia presenting with 12 week history of lumbar radicular pain in right L4 and L5 dermatomal distribution accompanied by weakness numbness and paresthesias  The patient rates the pain at a 8/10 accompanied by electric shock-like shooting features and crampy burning pain in the aforementioned dermatomal distributions  She additionally notes symptomatology of aching, nagging, indolent, stabbing, throbbing features of the pain in her back, symptomatic of arthritic like low back pain  The pain is worse in the mornings as well as the end of the day; exertion such as walking for long periods of time seems to exacerbate the pain  The patient can hardly walk more than a few blocks without having debilitating pain  She tries to maintain an active lifestyle and finds that the current degree of pain seems to compromises her efforts  The pain significantly impacts independent activities of daily living and contributes to significant disability  She has attempted a 1 month course of physical therapy with moderate relief of the pain  She has taken ibuprofen as well as flexeril with limited relief of the pain as well  She has never tried epidural steroid injections in the past  She denies any bowel or bladder dysfunction/incontinence    I have personally reviewed and/or updated the patient's past medical history, past surgical history, family history, social history, current medications, allergies, and vital signs today  Review of Systems   Constitutional: Positive for activity change  HENT: Negative      Eyes: Negative  Respiratory: Negative  Cardiovascular: Negative  Gastrointestinal: Negative  Endocrine: Negative  Genitourinary: Negative  Musculoskeletal: Positive for arthralgias, back pain, gait problem and myalgias  Skin: Negative  Allergic/Immunologic: Negative  Neurological: Positive for weakness and numbness  Hematological: Negative  Psychiatric/Behavioral: Negative  All other systems reviewed and are negative        Patient Active Problem List   Diagnosis   • Mixed hyperlipidemia   • Hypertension   • Anxiety   • Vitamin D deficiency   • Lumbar facet arthropathy   • Lumbar radiculopathy   • Gastroesophageal reflux disease without esophagitis       Past Medical History:   Diagnosis Date   • Hyperlipidemia    • Hypertension        Past Surgical History:   Procedure Laterality Date   • BREAST BIOPSY Left 2009    core bx; benign    • BREAST BIOPSY Right 05/18/2022    US guided biopsy   • CHOLECYSTECTOMY  1995   • EPIDURAL BLOCK INJECTION Right 12/02/2021    Procedure: L4 and L5 TRANSFORAMINAL EPIDURAL STEROID INJECTION;  Surgeon: Kaiden Mederos MD;  Location: [facility];  Service: Pain Management    • EPIDURAL BLOCK INJECTION N/A 12/28/2021    Procedure: BLOCK / INJECTION EPIDURAL STEROID LUMBAR L4-L5;  Surgeon: Kaiden Mederos MD;  Location: [facility];  Service: Pain Management    • FL GUIDED NEEDLE PLAC BX/ASP/INJ  03/03/2022   • FL GUIDED NEEDLE PLAC BX/ASP/INJ  04/14/2022   • HYSTERECTOMY      age 26   • NERVE BLOCK Bilateral 02/15/2022    Procedure: BLOCK MEDIAL BRANCH L3, L4, L5 #1;  Surgeon: Kaiden Mederos MD;  Location: [facility];  Service: Pain Management    • NERVE BLOCK Bilateral 03/03/2022    Procedure: BLOCK MEDIAL BRANCH L3, L4, L5 #2;  Surgeon: Kaiden Mederos MD;  Location: [facility];  Service: Pain Management    • RHIZOTOMY Bilateral 04/14/2022    Procedure: RHIZOTOMY LUMBAR L3, L4, L5;  Surgeon: Kaiden eMderos MD;  Location: [facility];  Service: Pain Management    • RHIZOTOMY Bilateral 11/15/2022    Procedure: RHIZOTOMY LUMBAR L3, L4, L5;  Surgeon: Marcos James MD;  Location: [facility];  Service: Pain Management    • TOOTH EXTRACTION     • US GUIDED BREAST BIOPSY RIGHT COMPLETE Right 5/18/2022       Family History   Problem Relation Age of Onset   • Alzheimer's disease Mother    • Lung cancer Mother    • Heart disease Father         cardiac disorder   • Prostate cancer Father    • Breast cancer Sister 59   • No Known Problems Daughter    • Tongue cancer Maternal Grandmother    • Lung cancer Maternal Grandfather    • No Known Problems Paternal Grandmother    • Lung cancer Paternal Grandfather    • Emphysema Maternal Aunt    • Breast cancer Cousin 39   • No Known Problems Son    • No Known Problems Son    • No Known Problems Paternal Aunt    • No Known Problems Paternal Aunt    • No Known Problems Paternal Aunt        Social History     Occupational History   • Not on file   Tobacco Use   • Smoking status: Never   • Smokeless tobacco: Never   Vaping Use   • Vaping Use: Never used   Substance and Sexual Activity   • Alcohol use: Yes     Comment: social drinker   • Drug use: No   • Sexual activity: Yes     Partners: Male     Birth control/protection: None     Comment: hysterectomy       Current Outpatient Medications on File Prior to Visit   Medication Sig   • Calcium-Vitamin D-Vitamin K (VIACTIV PO) Take by mouth daily   • cetirizine (ZyrTEC) 10 MG chewable tablet Chew 10 mg daily   • Cholecalciferol 50 MCG (2000 UT) CAPS Take by mouth daily   • desvenlafaxine succinate (PRISTIQ) 50 mg 24 hr tablet Take 1 tablet (50 mg total) by mouth daily   • gabapentin (NEURONTIN) 300 mg capsule Take 1 capsule (300 mg total) by mouth 3 (three) times a day   • Glucosamine-Chondroitin--200-150 MG TABS    • metoprolol succinate (TOPROL-XL) 50 mg 24 hr tablet TAKE 1 TABLET DAILY   • Multiple Vitamin (MULTIVITAMIN) tablet Take 1 tablet by mouth daily   • pantoprazole (PROTONIX) 40 mg tablet Take 1 tablet (40 mg total) by mouth daily   • rosuvastatin (CRESTOR) 10 MG tablet TAKE 1 TABLET DAILY   • sodium hyaluronate (Durolane) 60 MG/3ML injection 3 mL   • spironolactone (ALDACTONE) 25 mg tablet TAKE 1 TABLET DAILY   • triamcinolone acetonide (KENALOG-40) 40 mg/mL 1 mL   • Zoster Vac Recomb Adjuvanted (Shingrix) 50 MCG/0 5ML SUSR 0 5mL IM for one dose, followed by 0 5mL IM 2-6 months after first dose   • benzonatate (TESSALON PERLES) 100 mg capsule Take 1 capsule (100 mg total) by mouth 3 (three) times a day as needed for cough (Patient not taking: Reported on 10/26/2022)   • ibuprofen (MOTRIN) 600 mg tablet Take 1 tablet (600 mg total) by mouth every 12 (twelve) hours as needed for moderate pain (Patient not taking: Reported on 11/28/2022)   • lidocaine (XYLOCAINE) 1 % 2 mL (Patient not taking: Reported on 10/26/2022)     No current facility-administered medications on file prior to visit  Allergies   Allergen Reactions   • Codeine Other (See Comments)     "throat swells"         Physical Exam    /82   Pulse 78   Temp (!) 97 3 °F (36 3 °C)   Resp 20   Ht 5' 8" (1 727 m)   Wt 89 9 kg (198 lb 3 2 oz)   BMI 30 14 kg/m²     Constitutional: normal, well developed, well nourished, alert, in no distress and non-toxic and no overt pain behavior  and overweight  Eyes: anicteric  HEENT: grossly intact  Neck: supple, symmetric, trachea midline and no masses   Pulmonary:even and unlabored  Cardiovascular:No edema or pitting edema present  Skin:Normal without rashes or lesions and well hydrated  Psychiatric:Mood and affect appropriate  Neurologic:Cranial Nerves II-XII grossly intact Sensation grossly intact; no clonus negative bethea's  Reflexes 2+ and brisk  SLR weakly positive right sided  Musculoskeletal:normal gait  Mild weakness with right sided hip flexion, knee extension and right ehl extension  Otherwise 5/5 strength in all other active range of motion movements b/l   Normal heel toe and tip toe walking  Significant pain with lumbar facet loading bilaterally and with lateral spine rotation  ttp over lumbar paraspinal muscles  Negative deo's test, negative gaenslen's negative SIJ loading bilaterally  Imaging    MRI LUMBAR SPINE WITHOUT CONTRAST     INDICATION: [id number] 16: Radiculopathy, lumbar region  8 weeks of right-sided lumbar radiculopathy  Acute intractable low back pain  Status post 6 weeks of physical therapy  Leg weakness  Low back pain      COMPARISON:  8/17/2021     TECHNIQUE:  Sagittal T1, sagittal T2, sagittal inversion recovery, axial T1 and axial T2, coronal T2     IMAGE QUALITY:  Diagnostic     FINDINGS:     VERTEBRAL BODIES:  There are 5 lumbar type vertebral bodies  Normal alignment of the lumbar spine  No spondylolysis or spondylolisthesis  No scoliosis  No compression fracture  Scattered degenerative endplate changes  No focally suspicious marrow   lesions  No bone marrow edema or compression abnormality  Type II Modic endplate changes R90-O4  Hemangioma T12 vertebra  Small hemangioma centrally in the L4 vertebra      SACRUM:  Normal signal within the sacrum  No evidence of insufficiency or stress fracture      DISTAL CORD AND CONUS:  Normal size and signal within the distal cord and conus  The conus medullaris terminates at the superior endplate of L2      PARASPINAL SOFT TISSUES:  Paraspinal soft tissues are unremarkable      LOWER THORACIC DISC SPACES:  Mild noncompressive lower thoracic degenerative change      LUMBAR DISC SPACES:     L1-L2:  Normal      L2-L3:  Normal      L3-L4:  There is a left neural foraminal disc herniation, protrusion type  Mild left neural foraminal narrowing  Central canal and right neural foramen patent      L4-L5:  There is a diffuse disk bulge  No significant central canal or neural foraminal narrowing  Bilateral facet hypertrophy noted      L5-S1:  There is a diffuse disk bulge    No significant central canal or neural foraminal narrowing  Bilateral facet hypertrophy noted      IMPRESSION:     Mild noncompressive lumbar degenerative change

## 2022-11-28 NOTE — PATIENT INSTRUCTIONS
Core Strengthening Exercises   WHAT YOU NEED TO KNOW:   Your core includes the muscles of your lower back, hip, pelvis, and abdomen  Core strengthening exercises help heal and strengthen these muscles  This helps prevent another injury, and keeps your pelvis, spine, and hips in the correct position  DISCHARGE INSTRUCTIONS:   Contact your healthcare provider if:   You have sharp or worsening pain during exercise or at rest     You have questions or concerns about your shoulder exercises  Safety tips:  Talk to your healthcare provider before you start an exercise program  A physical therapist can teach you how to do core strengthening exercises safely  Do the exercises on a mat or firm surface  A firm surface will support your spine and prevent low back pain  Do not do these exercises on a bed  Move slowly and smoothly  Avoid fast or jerky motions  Stop if you feel pain  Core exercises should not be painful  Stop if you feel pain  Breathe normally during core exercises  Do not hold your breath  This may cause an increase in blood pressure and prevent muscle strengthening  Your healthcare provider will tell you when to inhale and exhale during the exercise  Begin all of your exercises with abdominal bracing  Abdominal bracing helps warm up your core muscles  You can also practice abdominal bracing throughout the day  Lie on your back with your knees bent and feet flat on the floor  Place your arms in a relaxed position beside your body  Tighten your abdominal muscles  Pull your belly button in and up toward your spine  Hold for 5 seconds  Relax your muscles  Repeat 10 times  Core strengthening exercises: Your healthcare provider will tell you how often to do these exercises  The provider will also tell you how many repetitions of each exercise you should do  Hold each exercise for 5 seconds or as directed  As you get stronger, increase your hold to 10 to 15 seconds   You can do some of these exercises on a stability ball, or with a weight  Ask your healthcare provider how to use a stability ball or weight for these exercises:  Bridging:  Lie on your back with your knees bent and feet flat on the floor  Rest your arms at your side  Tighten your buttocks, and then lift your hips 1 inch off the floor  Hold for 5 seconds  When you can do this exercise without pain for 10 seconds, increase the distance you lift your hips  A good goal is to be able to lift your hips so that your shoulders, hips, and knees are in a straight line  Dead bug:  Lie on your back with your knees bent and feet flat on the floor  Place your arms in a relaxed position beside your body  Begin with abdominal bracing  Next, raise one leg, keeping your knee bent  Hold for 5 seconds  Repeat with the other leg  When you can do this exercise without pain for 10 to 15 seconds, you may raise one straight leg and hold  Repeat with the other leg  Quadruped:  Place your hands and knees on the floor  Keep your wrists directly below your shoulders and your knees directly below your hips  Pull your belly button in toward your spine  Do not flatten or arch your back  Tighten your abdominal muscles below your belly button  Hold for 5 seconds  When you can do this exercise without pain for 10 to 15 seconds, you may extend one arm and hold  Repeat on the other side  Side bridge exercises:      Standing side bridge:  Stand next to a wall and extend one arm toward the wall  Place your palm flat on the wall with your fingers pointing upward  Begin with abdominal bracing  Next, without moving your feet, slowly bend your arm to 90 degrees  Hold for 5 seconds  Repeat on the other side  When you can do this exercise without pain for 10 to 15 seconds, you may do the bent leg side bridge on the floor  Bent leg side bridge:  Lie on one side with your legs, hips, and shoulders in a straight line   Prop yourself up onto your forearm so your elbow is directly below your shoulder  Bend your knees back to 90 degrees  Begin with abdominal bracing  Next, lift your hips and balance yourself on your forearm and knees  Hold for 5 seconds  Repeat on the other side  When you can do this exercise without pain for 10 to 15 seconds, you may do the straight leg side bridge on the floor  Straight leg side bridge:  Lie on one side with your legs, hips, and shoulders in a straight line  Prop yourself up onto your forearm so your elbow is directly below your shoulder  Begin with abdominal bracing  Lift your hips off the floor and balance yourself on your forearm and the outside of your flexed foot  Do not let your ankle bend sideways  Hold for 5 seconds  Repeat on the other side  When you can do this exercise without pain for 10 to 15 seconds, ask your healthcare provider for more advanced exercises  Superman:  Lie on your stomach  Extend your arms forward on the floor  Tighten your abdominal muscles and lift your right hand and left leg off the floor  Hold this position  Slowly return to the starting position  Tighten your abdominal muscles and lift your left hand and right leg off the floor  Hold this position  Slowly return to the starting position  Clam:  Lie on your side with your knees bent  Put your bottom arm under your head to keep your neck in line  Put your top hand on your hip to keep your pelvis from moving  Put your heels together, and keep them together during this exercise  Slowly raise your top knee toward the ceiling  Then lower your leg so your knees are together  Repeat this exercise 10 times  Then switch sides and do the exercise 10 times with the other leg  Curl up:  Lie on your back with your knees bent and feet flat on the floor  Place your hands, palms down, underneath your lower back  Next, with your elbows on the floor, lift your shoulders and chest 2 to 3 inches off the floor   Keep your head in line with your shoulders  Hold this position  Slowly return to the starting position  Straight leg raises:  Lie on your back with one leg straight  Bend the other knee and place your foot flat on the floor  Tighten your abdominal muscles  Keep your leg straight and slowly lift it straight up 6 to 12 inches off the floor  Hold this position  Lower your leg slowly  Do as many repetitions as directed on this side  Repeat with the other leg  Plank:  Lie on your stomach  Bend your elbows and place your forearms flat on the floor  Lift your chest, stomach, and knees off the floor  Make sure your elbows are below your shoulders  Your body should be in a straight line  Do not let your hips or lower back sink to the ground  Squeeze your abdominal muscles together and hold for 15 seconds  To make this exercise harder, hold for 30 seconds or lift 1 leg at a time  Bicycles:  Lie on your back  Bend both knees and bring them toward your chest  Your calves should be parallel to the floor  Place the palms of your hands on the back of your head  Straighten your right leg and keep it lifted 2 inches off the floor  Raise your head and shoulders off the floor and twist towards your left  Keep your head and shoulders lifted  Bend your right knee while you straighten your left leg  Keep your left leg 2 inches off the floor  Twist your head and chest towards the left leg  Continue to straighten 1 leg at a time and twist        Follow up with your doctor as directed:  Write down your questions so you remember to ask them during your visits  © Copyright [employer] 2022 Information is for End User's use only and may not be sold, redistributed or otherwise used for commercial purposes  All illustrations and images included in CareNotes® are the copyrighted property of [employer]  or [facility] Laurie Tirado   The above information is an  only   It is not intended as medical advice for individual conditions or treatments  Talk to your doctor, nurse or pharmacist before following any medical regimen to see if it is safe and effective for you

## 2023-01-23 DIAGNOSIS — E78.2 MIXED HYPERLIPIDEMIA: ICD-10-CM

## 2023-01-23 RX ORDER — ROSUVASTATIN CALCIUM 10 MG/1
TABLET, COATED ORAL
Qty: 90 TABLET | Refills: 3 | Status: SHIPPED | OUTPATIENT
Start: 2023-01-23

## 2023-02-13 DIAGNOSIS — M54.16 LUMBAR RADICULOPATHY, ACUTE: ICD-10-CM

## 2023-02-13 RX ORDER — IBUPROFEN 600 MG/1
TABLET ORAL
Qty: 180 TABLET | Refills: 3 | Status: SHIPPED | OUTPATIENT
Start: 2023-02-13

## 2023-03-03 NOTE — PROGRESS NOTES
Assessment/Plan:    Will start vivelle dot  [facility] research reviewed in light of risks of blood clot, stroke, MI and breast cancer in early 46s  Will RTO in 3 mths for reeval  If still having trouble sleeping, may consider adding progestin despite no uterus  Discussed ELDON  Pt will perform consistent kegels  She would like to avoid surgery  Can consider pelvic floor PT at next eval in 3 months  Recommended monthly SBE, annual CBE and annual screening mammo  ASCCP guidelines reviewed and pap with cotesting noted to be up to date; this low risk patient was advised she meets criteria to d/c pap screening at age 70  Pap done at pt request  Colonoscopy referral placed  Reviewed diet/activity recommendations Calcium 1200 mg and Vit D 600-1000 IU daily  Discussed postmenopausal considerations and symptoms to report  Kegel exercises as instructed  Diagnoses and all orders for this visit:    Encounter for gynecological examination (general) (routine) with abnormal findings    Screening mammogram for breast cancer    Encounter for Papanicolaou smear for cervical cancer screening  -     Liquid-based pap, screening    Menopause  -     estradiol (Vivelle-Dot) 0 05 MG/24HR; Place 1 patch on the skin 2 (two) times a week    Colon cancer screening  -     Ambulatory Referral to Gastroenterology; Future    ELDON (stress urinary incontinence, female)    Other orders  -     Rhubarb (ESTROVEN COMPLETE PO); Take by mouth        Subjective:      Patient ID: Michael Galindo is a 45 y o  female  This patient presents for routine annual gyn exam    VM sx were managed well with Estroven until this last year  She is now having many hot flashes during the day and night sweats that are interrupting sleep and negatively impacting QOL  Pt also reports worsening ELDON  She would like to avoid surgery  She is not doing kegels  Hx of hyst for AUB, patient states she was told pathology was precancerous and was advised to continue paps   She is agreeable to paps every other year  She denies vaginal bleeding or spotting,  pelvic pain, dyspareunia, breast concerns, abnormal discharge, bowel/bladder dysfunction, depression/anx  , sexually active and is monogamous  Pap/HPV up to date and normal, 1/19/21  Mammography 5/2022 showed right breast mass with benign biopsy and recommendation to return to screening  Colonoscopy not done to date secondary to COVID  Cologuard ordered by PCP  Pt states she would rather have a colonoscopy  The following portions of the patient's history were reviewed and updated as appropriate: allergies, current medications, past family history, past medical history, past social history, past surgical history and problem list     Review of Systems   Constitutional: Negative  Respiratory: Negative  Cardiovascular: Negative  Gastrointestinal: Negative  Endocrine: Negative  Genitourinary: Negative for dyspareunia, dysuria, frequency, pelvic pain, urgency, vaginal bleeding, vaginal discharge and vaginal pain  Musculoskeletal: Negative  Skin: Negative  Neurological: Negative  Psychiatric/Behavioral: Negative  Objective:      /70   Ht 5' 8" (1 727 m)   Wt 92 1 kg (203 lb)   BMI 30 87 kg/m²          Physical Exam  Vitals and nursing note reviewed  Exam conducted with a chaperone present  Constitutional:       Appearance: Normal appearance  She is well-developed  HENT:      Head: Normocephalic and atraumatic  Neck:      Thyroid: No thyroid mass or thyromegaly  Cardiovascular:      Rate and Rhythm: Normal rate and regular rhythm  Heart sounds: Normal heart sounds  Pulmonary:      Effort: Pulmonary effort is normal       Breath sounds: Normal breath sounds  Chest:   Breasts:     Breasts are symmetrical       Right: No inverted nipple, mass, nipple discharge, skin change or tenderness  Left: No inverted nipple, mass, nipple discharge, skin change or tenderness  Abdominal:      General: Bowel sounds are normal       Palpations: Abdomen is soft  Tenderness: There is no abdominal tenderness  Hernia: There is no hernia in the left inguinal area or right inguinal area  Genitourinary:     General: Normal vulva  Exam position: Supine  Pubic Area: No rash  Labia:         Right: No rash, tenderness, lesion or injury  Left: No rash, tenderness, lesion or injury  Urethra: No prolapse, urethral pain, urethral swelling or urethral lesion  Vagina: Normal  No signs of injury and foreign body  No vaginal discharge, erythema, tenderness, bleeding, lesions or prolapsed vaginal walls  Adnexa:         Right: No mass, tenderness or fullness  Left: No mass, tenderness or fullness  Rectum: No external hemorrhoid  Comments: Urethra normal without lesions  No bladder tenderness  Cervix, uterus absent, no masses or tenderness on BME  Musculoskeletal:         General: Normal range of motion  Cervical back: Normal range of motion and neck supple  Lymphadenopathy:      Lower Body: No right inguinal adenopathy  No left inguinal adenopathy  Skin:     General: Skin is warm and dry  Neurological:      Mental Status: She is alert and oriented to person, place, and time  Psychiatric:         Speech: Speech normal          Behavior: Behavior normal  Behavior is cooperative

## 2023-03-07 ENCOUNTER — OFFICE VISIT (OUTPATIENT)
Dept: FAMILY MEDICINE CLINIC | Facility: CLINIC | Age: 53
End: 2023-03-07

## 2023-03-07 ENCOUNTER — ANNUAL EXAM (OUTPATIENT)
Dept: GYNECOLOGY | Facility: CLINIC | Age: 53
End: 2023-03-07

## 2023-03-07 VITALS
DIASTOLIC BLOOD PRESSURE: 70 MMHG | HEIGHT: 68 IN | BODY MASS INDEX: 30.77 KG/M2 | WEIGHT: 203 LBS | SYSTOLIC BLOOD PRESSURE: 120 MMHG

## 2023-03-07 VITALS
SYSTOLIC BLOOD PRESSURE: 122 MMHG | DIASTOLIC BLOOD PRESSURE: 84 MMHG | WEIGHT: 203 LBS | HEIGHT: 68 IN | TEMPERATURE: 96.1 F | HEART RATE: 82 BPM | OXYGEN SATURATION: 96 % | BODY MASS INDEX: 30.77 KG/M2

## 2023-03-07 DIAGNOSIS — N39.3 SUI (STRESS URINARY INCONTINENCE, FEMALE): ICD-10-CM

## 2023-03-07 DIAGNOSIS — R10.13 EPIGASTRIC PAIN: Primary | ICD-10-CM

## 2023-03-07 DIAGNOSIS — Z12.31 SCREENING MAMMOGRAM FOR BREAST CANCER: ICD-10-CM

## 2023-03-07 DIAGNOSIS — Z12.11 COLON CANCER SCREENING: ICD-10-CM

## 2023-03-07 DIAGNOSIS — Z01.411 ENCOUNTER FOR GYNECOLOGICAL EXAMINATION (GENERAL) (ROUTINE) WITH ABNORMAL FINDINGS: Primary | ICD-10-CM

## 2023-03-07 DIAGNOSIS — K21.9 GASTROESOPHAGEAL REFLUX DISEASE WITHOUT ESOPHAGITIS: ICD-10-CM

## 2023-03-07 DIAGNOSIS — Z12.4 ENCOUNTER FOR PAPANICOLAOU SMEAR FOR CERVICAL CANCER SCREENING: ICD-10-CM

## 2023-03-07 DIAGNOSIS — R10.13 ACUTE EPIGASTRIC PAIN: Primary | ICD-10-CM

## 2023-03-07 DIAGNOSIS — Z78.0 MENOPAUSE: ICD-10-CM

## 2023-03-07 RX ORDER — ESTRADIOL 0.05 MG/D
1 FILM, EXTENDED RELEASE TRANSDERMAL 2 TIMES WEEKLY
Qty: 24 PATCH | Refills: 1 | Status: SHIPPED | OUTPATIENT
Start: 2023-03-09

## 2023-03-07 RX ORDER — SUCRALFATE 1 G/1
1 TABLET ORAL 4 TIMES DAILY
Qty: 28 TABLET | Refills: 0 | Status: SHIPPED | OUTPATIENT
Start: 2023-03-07 | End: 2023-03-14

## 2023-03-07 RX ORDER — SUCRALFATE 1 G/1
1 TABLET ORAL 4 TIMES DAILY
Qty: 360 TABLET | Refills: 2 | Status: SHIPPED | OUTPATIENT
Start: 2023-03-07

## 2023-03-07 RX ORDER — PANTOPRAZOLE SODIUM 40 MG/1
40 TABLET, DELAYED RELEASE ORAL DAILY
Qty: 7 TABLET | Refills: 0 | Status: SHIPPED | OUTPATIENT
Start: 2023-03-07 | End: 2023-03-14

## 2023-03-07 RX ORDER — PANTOPRAZOLE SODIUM 40 MG/1
40 TABLET, DELAYED RELEASE ORAL DAILY
Qty: 90 TABLET | Refills: 2 | Status: SHIPPED | OUTPATIENT
Start: 2023-03-07

## 2023-03-07 NOTE — PROGRESS NOTES
BMI Counseling: Body mass index is 30 87 kg/m²  The BMI is above normal  Nutrition recommendations include decreasing portion sizes, encouraging healthy choices of fruits and vegetables, moderation in carbohydrate intake and increasing intake of lean protein  Rationale for BMI follow-up plan is due to patient being overweight or obese  Assessment/Plan: Patient will have an upper GI she will start her on Protonix and sucralfate pending the results of her upper GI did order a CBC and amylase also she will void ibuprofen and she will try to eat a bland diet    Problem List Items Addressed This Visit    None  Visit Diagnoses     Acute epigastric pain    -  Primary           Diagnoses and all orders for this visit:    Acute epigastric pain        No problem-specific Assessment & Plan notes found for this encounter  Subjective:      Patient ID: Meek Akhtar is a 45 y o  female  2-week history epigastric pain sometimes better when the patient eats she has been taking an over-the-counter acid reducer but pain is becoming more severe her gallbladder has been no hematochezia melena removed      The following portions of the patient's history were reviewed and updated as appropriate:   She has a past medical history of Hyperlipidemia and Hypertension  ,  does not have any pertinent problems on file  ,   has a past surgical history that includes Cholecystectomy (1995); Tooth extraction; Hysterectomy; Breast biopsy (Left, 2009); Epidural block injection (Right, 12/02/2021); Epidural block injection (N/A, 12/28/2021); NERVE BLOCK (Bilateral, 02/15/2022); NERVE BLOCK (Bilateral, 03/03/2022); FL guided needle plac bx/asp/inj (03/03/2022); Rhizotomy (Bilateral, 04/14/2022); FL guided needle plac bx/asp/inj (04/14/2022); Breast biopsy (Right, 05/18/2022); US guided breast biopsy right complete (Right, 5/18/2022); and Rhizotomy (Bilateral, 11/15/2022)  ,  family history includes Alzheimer's disease in her mother; Breast cancer (age of onset: 39) in her cousin; Breast cancer (age of onset: 59) in her sister; Emphysema in her maternal aunt; Heart disease in her father; Lung cancer in her maternal grandfather, mother, and paternal grandfather; No Known Problems in her daughter, paternal aunt, paternal aunt, paternal aunt, paternal grandmother, son, and son; Prostate cancer in her father; Tongue cancer in her maternal grandmother  ,   reports that she has never smoked  She has never used smokeless tobacco  She reports current alcohol use  She reports that she does not use drugs  ,  is allergic to codeine     Current Outpatient Medications   Medication Sig Dispense Refill   • Calcium-Vitamin D-Vitamin K (VIACTIV PO) Take by mouth daily     • cetirizine (ZyrTEC) 10 MG chewable tablet Chew 10 mg daily     • Cholecalciferol 50 MCG (2000 UT) CAPS Take by mouth daily     • desvenlafaxine succinate (PRISTIQ) 50 mg 24 hr tablet Take 1 tablet (50 mg total) by mouth daily 90 tablet 3   • [START ON 3/9/2023] estradiol (Vivelle-Dot) 0 05 MG/24HR Place 1 patch on the skin 2 (two) times a week 24 patch 1   • gabapentin (NEURONTIN) 300 mg capsule Take 1 capsule (300 mg total) by mouth 3 (three) times a day 270 capsule 0   • Glucosamine-Chondroitin--200-150 MG TABS      • ibuprofen (MOTRIN) 600 mg tablet TAKE 1 TABLET EVERY 12 HOURS AS NEEDED FOR MODERATE PAIN 180 tablet 3   • metoprolol succinate (TOPROL-XL) 50 mg 24 hr tablet TAKE 1 TABLET DAILY 90 tablet 3   • Multiple Vitamin (MULTIVITAMIN) tablet Take 1 tablet by mouth daily     • pantoprazole (PROTONIX) 40 mg tablet Take 1 tablet (40 mg total) by mouth daily 90 tablet 0   • Rhubarb (ESTROVEN COMPLETE PO) Take by mouth     • rosuvastatin (CRESTOR) 10 MG tablet TAKE 1 TABLET DAILY 90 tablet 3   • sodium hyaluronate (Durolane) 60 MG/3ML injection 3 mL     • spironolactone (ALDACTONE) 25 mg tablet TAKE 1 TABLET DAILY 90 tablet 3     No current facility-administered medications for this visit  Review of Systems   Constitutional: Negative for activity change, appetite change, diaphoresis, fatigue and fever  HENT: Negative  Eyes: Negative  Respiratory: Negative for apnea, cough, chest tightness, shortness of breath and wheezing  Cardiovascular: Negative for chest pain, palpitations and leg swelling  Gastrointestinal: Positive for abdominal pain  Negative for abdominal distention, anal bleeding, constipation, diarrhea, nausea and vomiting  Endocrine: Negative for cold intolerance, heat intolerance, polydipsia, polyphagia and polyuria  Genitourinary: Negative for difficulty urinating, dysuria, flank pain, hematuria and urgency  Musculoskeletal: Positive for back pain  Negative for arthralgias, gait problem, joint swelling and myalgias  Skin: Negative for color change, rash and wound  Allergic/Immunologic: Negative for environmental allergies, food allergies and immunocompromised state  Neurological: Negative for dizziness, seizures, syncope, speech difficulty, numbness and headaches  Hematological: Negative for adenopathy  Does not bruise/bleed easily  Psychiatric/Behavioral: Negative for agitation, behavioral problems, hallucinations, sleep disturbance and suicidal ideas  Objective:  Vitals:    03/07/23 1442   BP: 122/84   BP Location: Left arm   Patient Position: Sitting   Cuff Size: Standard   Pulse: 82   Temp: (!) 96 1 °F (35 6 °C)   TempSrc: Temporal   SpO2: 96%   Weight: 92 1 kg (203 lb)   Height: 5' 8" (1 727 m)     Body mass index is 30 87 kg/m²  Physical Exam  Constitutional:       General: She is not in acute distress  Appearance: She is well-developed  She is obese  She is not diaphoretic  HENT:      Head: Normocephalic  Right Ear: External ear normal       Left Ear: External ear normal       Nose: Nose normal    Eyes:      General: No scleral icterus  Right eye: No discharge  Left eye: No discharge        Conjunctiva/sclera: Conjunctivae normal       Pupils: Pupils are equal, round, and reactive to light  Neck:      Thyroid: No thyromegaly  Trachea: No tracheal deviation  Cardiovascular:      Rate and Rhythm: Normal rate and regular rhythm  Heart sounds: Normal heart sounds  No murmur heard  No friction rub  No gallop  Pulmonary:      Effort: Pulmonary effort is normal  No respiratory distress  Breath sounds: Normal breath sounds  No wheezing  Abdominal:      General: Bowel sounds are normal       Palpations: Abdomen is soft  There is no mass  Tenderness: There is abdominal tenderness in the epigastric area  There is no guarding  Musculoskeletal:         General: No deformity  Cervical back: Normal range of motion  Lymphadenopathy:      Cervical: No cervical adenopathy  Skin:     General: Skin is warm and dry  Findings: No erythema or rash  Neurological:      Mental Status: She is alert and oriented to person, place, and time  Cranial Nerves: No cranial nerve deficit  Psychiatric:         Thought Content:  Thought content normal

## 2023-03-08 LAB
HPV HR 12 DNA CVX QL NAA+PROBE: NEGATIVE
HPV16 DNA CVX QL NAA+PROBE: NEGATIVE
HPV18 DNA CVX QL NAA+PROBE: NEGATIVE

## 2023-03-10 ENCOUNTER — HOSPITAL ENCOUNTER (OUTPATIENT)
Dept: RADIOLOGY | Facility: HOSPITAL | Age: 53
Discharge: HOME | End: 2023-03-10
Attending: FAMILY MEDICINE

## 2023-03-10 ENCOUNTER — LAB (OUTPATIENT)
Dept: LAB | Facility: HOSPITAL | Age: 53
End: 2023-03-10
Attending: FAMILY MEDICINE

## 2023-03-10 DIAGNOSIS — R10.13 ACUTE EPIGASTRIC PAIN: ICD-10-CM

## 2023-03-10 LAB
AMYLASE SERPL-CCNC: 28 IU/L (ref 29–103)
BASOPHILS # BLD AUTO: 0.07 THOUSANDS/ÂΜL (ref 0–0.1)
BASOPHILS NFR BLD AUTO: 1 % (ref 0–1)
EOSINOPHIL # BLD AUTO: 0.12 THOUSAND/ÂΜL (ref 0–0.61)
EOSINOPHIL NFR BLD AUTO: 2 % (ref 0–6)
ERYTHROCYTE [DISTWIDTH] IN BLOOD BY AUTOMATED COUNT: 12.6 % (ref 11.6–15.1)
HCT VFR BLD AUTO: 44.5 % (ref 34.8–46.1)
HGB BLD-MCNC: 15.1 G/DL (ref 11.5–15.4)
IMM GRANULOCYTES # BLD AUTO: 0.02 THOUSAND/UL (ref 0–0.2)
IMM GRANULOCYTES NFR BLD AUTO: 0 % (ref 0–2)
LYMPHOCYTES # BLD AUTO: 1.63 THOUSANDS/ÂΜL (ref 0.6–4.47)
LYMPHOCYTES NFR BLD AUTO: 28 % (ref 14–44)
MCH RBC QN AUTO: 30.9 PG (ref 26.8–34.3)
MCHC RBC AUTO-ENTMCNC: 33.9 G/DL (ref 31.4–37.4)
MCV RBC AUTO: 91 FL (ref 82–98)
MONOCYTES # BLD AUTO: 0.66 THOUSAND/ÂΜL (ref 0.17–1.22)
MONOCYTES NFR BLD AUTO: 12 % (ref 4–12)
NEUTROPHILS # BLD AUTO: 3.25 THOUSANDS/ÂΜL (ref 1.85–7.62)
NEUTS SEG NFR BLD AUTO: 57 % (ref 43–75)
NRBC BLD AUTO-RTO: 0 /100 WBCS
PLATELET # BLD AUTO: 236 THOUSANDS/UL (ref 149–390)
PMV BLD AUTO: 10 FL (ref 8.9–12.7)
RBC # BLD AUTO: 4.89 MILLION/UL (ref 3.81–5.12)
WBC # BLD AUTO: 5.75 THOUSAND/UL (ref 4.31–10.16)

## 2023-03-10 NOTE — RESULT ENCOUNTER NOTE
Please call the patient regarding her abnormal result    Upper GI shows a small reducible hiatal hernia I still think that the patient should be scoped because if she is having any significant reflux we need to be concerned about chronic esophagitis

## 2023-03-16 LAB
LAB AP GYN PRIMARY INTERPRETATION: NORMAL
Lab: NORMAL

## 2023-05-01 DIAGNOSIS — F41.8 ANXIETY ASSOCIATED WITH DEPRESSION: ICD-10-CM

## 2023-05-01 RX ORDER — DESVENLAFAXINE SUCCINATE 50 MG/1
TABLET, EXTENDED RELEASE ORAL
Qty: 90 TABLET | Refills: 3 | Status: SHIPPED | OUTPATIENT
Start: 2023-05-01

## 2023-05-03 ENCOUNTER — HOSPITAL ENCOUNTER (OUTPATIENT)
Dept: MAMMOGRAPHY | Facility: HOSPITAL | Age: 53
Discharge: HOME | End: 2023-05-03
Attending: FAMILY MEDICINE

## 2023-05-03 VITALS — HEIGHT: 68 IN | BODY MASS INDEX: 30.77 KG/M2 | WEIGHT: 203 LBS

## 2023-05-03 DIAGNOSIS — Z12.31 ENCOUNTER FOR SCREENING MAMMOGRAM FOR MALIGNANT NEOPLASM OF BREAST: ICD-10-CM

## 2023-05-04 DIAGNOSIS — R92.2 DENSE BREAST TISSUE ON MAMMOGRAM: Primary | ICD-10-CM

## 2023-05-04 NOTE — RESULT ENCOUNTER NOTE
Call patient to notify normal results because patient has dense breast tissue she should have a mammogram in 1 year and she should had have an MRI of both breasts in 6 months and then we will just alternate the 2 in order to make sure that we do not miss anything has very young breast tissue and therefore it does not transmit the x-ray very well and there it appears cloudy on x-ray so there is always the potential to miss something if we just do mammograms

## 2023-05-06 ENCOUNTER — OFFICE VISIT (OUTPATIENT)
Dept: URGENT CARE | Facility: MEDICAL CENTER | Age: 53
End: 2023-05-06

## 2023-05-06 VITALS
BODY MASS INDEX: 30.31 KG/M2 | WEIGHT: 200 LBS | RESPIRATION RATE: 20 BRPM | HEIGHT: 68 IN | TEMPERATURE: 98.1 F | OXYGEN SATURATION: 99 % | HEART RATE: 77 BPM

## 2023-05-06 DIAGNOSIS — J01.00 ACUTE NON-RECURRENT MAXILLARY SINUSITIS: Primary | ICD-10-CM

## 2023-05-06 RX ORDER — GENTAMICIN SULFATE 3 MG/ML
1 SOLUTION/ DROPS OPHTHALMIC EVERY 4 HOURS
Qty: 5 ML | Refills: 0 | Status: SHIPPED | OUTPATIENT
Start: 2023-05-06

## 2023-05-06 RX ORDER — AMOXICILLIN 500 MG/1
500 CAPSULE ORAL EVERY 8 HOURS SCHEDULED
Qty: 21 CAPSULE | Refills: 0 | Status: SHIPPED | OUTPATIENT
Start: 2023-05-06 | End: 2023-05-13

## 2023-05-06 NOTE — PATIENT INSTRUCTIONS
Drink at least 6 or 8 glasses of water or juice a day  Using a vaporizer or humidifier in the bedroom will be very helpful  Motrin or Aleve or aspirin for fever chills or aches  Robitussin DM or NyQuil for cough  Afrin nasal spray for facial and head congestion  Inhaling steam coming up from the sink will be very helpful  If symptoms are getting worse over the next 4-7 days you must be rechecked       Use all the antibiotic

## 2023-05-06 NOTE — PROGRESS NOTES
[facility]        NAME: Vinnie Nugent is a 45 y o  female  : 1970    MRN: [id number]  DATE: May 6, 2023  TIME: 12:46 PM    Assessment and Plan   Acute non-recurrent maxillary sinusitis [J01 00]  1  Acute non-recurrent maxillary sinusitis  amoxicillin (AMOXIL) 500 mg capsule            Patient Instructions       Follow up with PCP in 3-5 days  Proceed to  ER if symptoms worsen  Chief Complaint     Chief Complaint   Patient presents with    Cold Like Symptoms     Cold symptoms started last week, pt  Has sinus pressure and facial pain, congestion, left eye is red and irritated, symptoms started last week and took home covid test today which was negative          History of Present Illness       Head cold and congestion x1 week  Last several days facial pain and the teeth are beginning to ache  Review of Systems   Review of Systems   HENT: Positive for congestion, sinus pressure and sinus pain            Current Medications       Current Outpatient Medications:     amoxicillin (AMOXIL) 500 mg capsule, Take 1 capsule (500 mg total) by mouth every 8 (eight) hours for 7 days, Disp: 21 capsule, Rfl: 0    Calcium-Vitamin D-Vitamin K (VIACTIV PO), Take by mouth daily, Disp: , Rfl:     cetirizine (ZyrTEC) 10 MG chewable tablet, Chew 10 mg daily, Disp: , Rfl:     Cholecalciferol 50 MCG ( UT) CAPS, Take by mouth daily, Disp: , Rfl:     desvenlafaxine succinate (PRISTIQ) 50 mg 24 hr tablet, TAKE 1 TABLET DAILY, Disp: 90 tablet, Rfl: 3    estradiol (Vivelle-Dot) 0 05 MG/24HR, Place 1 patch on the skin 2 (two) times a week, Disp: 24 patch, Rfl: 1    gabapentin (NEURONTIN) 300 mg capsule, Take 1 capsule (300 mg total) by mouth 3 (three) times a day, Disp: 270 capsule, Rfl: 0    Glucosamine-Chondroitin--200-150 MG TABS, , Disp: , Rfl:     ibuprofen (MOTRIN) 600 mg tablet, TAKE 1 TABLET EVERY 12 HOURS AS NEEDED FOR MODERATE PAIN, Disp: 180 tablet, Rfl: 3    metoprolol succinate (TOPROL-XL) 50 mg 24 hr tablet, TAKE 1 TABLET DAILY, Disp: 90 tablet, Rfl: 3    Multiple Vitamin (MULTIVITAMIN) tablet, Take 1 tablet by mouth daily, Disp: , Rfl:     pantoprazole (PROTONIX) 40 mg tablet, Take 1 tablet (40 mg total) by mouth daily, Disp: 90 tablet, Rfl: 2    Rhubarb (ESTROVEN COMPLETE PO), Take by mouth, Disp: , Rfl:     rosuvastatin (CRESTOR) 10 MG tablet, TAKE 1 TABLET DAILY, Disp: 90 tablet, Rfl: 3    sodium hyaluronate (Durolane) 60 MG/3ML injection, 3 mL, Disp: , Rfl:     spironolactone (ALDACTONE) 25 mg tablet, TAKE 1 TABLET DAILY, Disp: 90 tablet, Rfl: 3    sucralfate (CARAFATE) 1 g tablet, Take 1 tablet (1 g total) by mouth 4 (four) times a day, Disp: 360 tablet, Rfl: 2    pantoprazole (PROTONIX) 40 mg tablet, Take 1 tablet (40 mg total) by mouth daily for 7 days, Disp: 7 tablet, Rfl: 0    sucralfate (CARAFATE) 1 g tablet, Take 1 tablet (1 g total) by mouth 4 (four) times a day for 7 days, Disp: 28 tablet, Rfl: 0    Current Allergies     Allergies as of 05/06/2023 - Reviewed 05/06/2023   Allergen Reaction Noted    Codeine Other (See Comments)             The following portions of the patient's history were reviewed and updated as appropriate: allergies, current medications, past family history, past medical history, past social history, past surgical history and problem list      Past Medical History:   Diagnosis Date    Hyperlipidemia     Hypertension        Past Surgical History:   Procedure Laterality Date    BREAST BIOPSY Left 2009    core bx; benign     BREAST BIOPSY Right 05/18/2022    US guided biopsy    CHOLECYSTECTOMY  1995    EPIDURAL BLOCK INJECTION Right 12/02/2021    Procedure: L4 and L5 TRANSFORAMINAL EPIDURAL STEROID INJECTION;  Surgeon: Mei Cavazos MD;  [facility];  Service: Pain Management     EPIDURAL BLOCK INJECTION N/A 12/28/2021    Procedure: BLOCK / INJECTION EPIDURAL STEROID LUMBAR L4-L5;  Surgeon: Mei Cavazos MD;  Location: [facility]; " Service: Pain Management     FL GUIDED NEEDLE PLAC BX/ASP/INJ  03/03/2022    FL GUIDED NEEDLE PLAC BX/ASP/INJ  04/14/2022    HYSTERECTOMY      age 26    NERVE BLOCK Bilateral 02/15/2022    Procedure: BLOCK MEDIAL BRANCH L3, L4, L5 #1;  Surgeon: Rodrigo Lamb MD;  [facility];  Service: Pain Management     NERVE BLOCK Bilateral 03/03/2022    Procedure: BLOCK MEDIAL BRANCH L3, L4, L5 #2;  Surgeon: Rodrigo Lamb MD;  [facility];  Service: Pain Management     RHIZOTOMY Bilateral 04/14/2022    Procedure: RHIZOTOMY LUMBAR L3, L4, L5;  Surgeon: Rodrigo Lamb MD;  [facility];  Service: Pain Management     RHIZOTOMY Bilateral 11/15/2022    Procedure: RHIZOTOMY LUMBAR L3, L4, L5;  Surgeon: Rodrigo Lamb MD;  [facility];  Service: Pain Management     TOOTH EXTRACTION      US GUIDED BREAST BIOPSY RIGHT COMPLETE Right 5/18/2022       Family History   Problem Relation Age of Onset    Alzheimer's disease Mother     Lung cancer Mother     Heart disease Father         cardiac disorder    Prostate cancer Father     Breast cancer Sister 59    No Known Problems Daughter     Tongue cancer Maternal Grandmother     Lung cancer Maternal Grandfather     No Known Problems Paternal Grandmother     Lung cancer Paternal Grandfather     Emphysema Maternal Aunt     Breast cancer Cousin 39    No Known Problems Son     No Known Problems Son     No Known Problems Paternal Aunt     No Known Problems Paternal Aunt     No Known Problems Paternal Aunt          Medications have been verified  Objective   Pulse 77   Temp 98 1 °F (36 7 °C)   Resp 20   Ht 5' 8\" (1 727 m)   Wt 90 7 kg (200 lb)   SpO2 99%   BMI 30 41 kg/m²   No LMP recorded  Patient has had a hysterectomy         Physical Exam     Physical Exam  HENT:      Head:      Comments: Pain over the maxillary sinuses     Right Ear: Tympanic membrane normal       Left Ear: Tympanic membrane normal                    "

## 2023-05-12 ENCOUNTER — OFFICE VISIT (OUTPATIENT)
Dept: FAMILY MEDICINE CLINIC | Facility: CLINIC | Age: 53
End: 2023-05-12

## 2023-05-12 ENCOUNTER — LAB (OUTPATIENT)
Dept: LAB | Facility: MEDICAL CENTER | Age: 53
End: 2023-05-12
Payer: COMMERCIAL

## 2023-05-12 VITALS
BODY MASS INDEX: 31.22 KG/M2 | DIASTOLIC BLOOD PRESSURE: 84 MMHG | HEIGHT: 68 IN | WEIGHT: 206 LBS | TEMPERATURE: 96.8 F | SYSTOLIC BLOOD PRESSURE: 128 MMHG | HEART RATE: 71 BPM | OXYGEN SATURATION: 97 %

## 2023-05-12 DIAGNOSIS — Z00.00 ANNUAL PHYSICAL EXAM: Primary | ICD-10-CM

## 2023-05-12 DIAGNOSIS — I10 ESSENTIAL HYPERTENSION: ICD-10-CM

## 2023-05-12 DIAGNOSIS — E78.2 MIXED HYPERLIPIDEMIA: ICD-10-CM

## 2023-05-12 DIAGNOSIS — R53.82 CHRONIC FATIGUE: ICD-10-CM

## 2023-05-12 DIAGNOSIS — K21.9 GASTROESOPHAGEAL REFLUX DISEASE, UNSPECIFIED WHETHER ESOPHAGITIS PRESENT: ICD-10-CM

## 2023-05-12 LAB
25(OH)D3 SERPL-MCNC: 44.6 NG/ML (ref 30–100)
ALBUMIN SERPL BCP-MCNC: 4 G/DL (ref 3.5–5)
ALP SERPL-CCNC: 77 U/L (ref 46–116)
ALT SERPL W P-5'-P-CCNC: 71 U/L (ref 12–78)
ANION GAP SERPL CALCULATED.3IONS-SCNC: 3 MMOL/L (ref 4–13)
AST SERPL W P-5'-P-CCNC: 53 U/L (ref 5–45)
BILIRUB SERPL-MCNC: 1.1 MG/DL (ref 0.2–1)
BUN SERPL-MCNC: 13 MG/DL (ref 5–25)
CALCIUM SERPL-MCNC: 9.5 MG/DL (ref 8.3–10.1)
CHLORIDE SERPL-SCNC: 106 MMOL/L (ref 96–108)
CHOLEST SERPL-MCNC: 181 MG/DL
CO2 SERPL-SCNC: 27 MMOL/L (ref 21–32)
CREAT SERPL-MCNC: 0.75 MG/DL (ref 0.6–1.3)
GFR SERPL CREATININE-BSD FRML MDRD: 90 ML/MIN/1.73SQ M
GLUCOSE P FAST SERPL-MCNC: 92 MG/DL (ref 65–99)
HDLC SERPL-MCNC: 63 MG/DL
LDLC SERPL CALC-MCNC: 86 MG/DL (ref 0–100)
NONHDLC SERPL-MCNC: 118 MG/DL
POTASSIUM SERPL-SCNC: 3.8 MMOL/L (ref 3.5–5.3)
PROT SERPL-MCNC: 7.5 G/DL (ref 6.4–8.4)
SODIUM SERPL-SCNC: 136 MMOL/L (ref 135–147)
TRIGL SERPL-MCNC: 158 MG/DL
TSH SERPL DL<=0.05 MIU/L-ACNC: 2.23 UIU/ML (ref 0.45–4.5)

## 2023-05-12 PROCEDURE — 82306 VITAMIN D 25 HYDROXY: CPT

## 2023-05-12 PROCEDURE — 80053 COMPREHEN METABOLIC PANEL: CPT

## 2023-05-12 PROCEDURE — 36415 COLL VENOUS BLD VENIPUNCTURE: CPT

## 2023-05-12 PROCEDURE — 80061 LIPID PANEL: CPT

## 2023-05-12 NOTE — PROGRESS NOTES
[facility]    NAME: Mindy Rodriguez  AGE: 45 y o  SEX: female  : 1970     DATE: 2023     Assessment and Plan:     Problem List Items Addressed This Visit    None      Immunizations and preventive care screenings were discussed with patient today  Appropriate education was printed on patient's after visit summary  Counseling:  Alcohol/drug use: discussed moderation in alcohol intake, the recommendations for healthy alcohol use, and avoidance of illicit drug use  Dental Health: discussed importance of regular tooth brushing, flossing, and dental visits  Injury prevention: discussed safety/seat belts, safety helmets, smoke detectors, carbon dioxide detectors, and smoking near bedding or upholstery  Sexual health: discussed sexually transmitted diseases, partner selection, use of condoms, avoidance of unintended pregnancy, and contraceptive alternatives  · Exercise: the importance of regular exercise/physical activity was discussed  Recommend exercise 3-5 times per week for at least 30 minutes  No follow-ups on file  Chief Complaint:     Chief Complaint   Patient presents with   • Physical Exam     Wellness for employer      History of Present Illness:     Adult Annual Physical   Patient here for a comprehensive physical exam  The patient reports no problems  Diet and Physical Activity  · Diet/Nutrition: well balanced diet  · Exercise: walking  Depression Screening  PHQ-2/9 Depression Screening    Little interest or pleasure in doing things: 0 - not at all  Feeling down, depressed, or hopeless: 0 - not at all  PHQ-2 Score: 0  PHQ-2 Interpretation: Negative depression screen       General Health  · Sleep: gets 7-8 hours of sleep on average  · Hearing: normal - bilateral   · Vision: no vision problems  · Dental: regular dental visits         /GYN Health  · Patient is: perimenopausal  · Last menstrual period: 2012  · Contraceptive method: None       Review of Systems:     Review of Systems   Past Medical History:     Past Medical History:   Diagnosis Date   • Hyperlipidemia    • Hypertension       Past Surgical History:     Past Surgical History:   Procedure Laterality Date   • BREAST BIOPSY Left 2009    core bx; benign    • BREAST BIOPSY Right 05/18/2022    US guided biopsy   • CHOLECYSTECTOMY  1995   • EPIDURAL BLOCK INJECTION Right 12/02/2021    Procedure: L4 and L5 TRANSFORAMINAL EPIDURAL STEROID INJECTION;  Surgeon: Altagracia Gill MD;  [facility];  Service: Pain Management    • EPIDURAL BLOCK INJECTION N/A 12/28/2021    Procedure: BLOCK / INJECTION EPIDURAL STEROID LUMBAR L4-L5;  Surgeon: Altagracia Gill MD;  [facility];  Service: Pain Management    • FL GUIDED NEEDLE PLAC BX/ASP/INJ  03/03/2022   • FL GUIDED NEEDLE PLAC BX/ASP/INJ  04/14/2022   • HYSTERECTOMY      age 26   • NERVE BLOCK Bilateral 02/15/2022    Procedure: BLOCK MEDIAL BRANCH L3, L4, L5 #1;  Surgeon: Altagracia Gill MD;  [facility];  Service: Pain Management    • NERVE BLOCK Bilateral 03/03/2022    Procedure: BLOCK MEDIAL BRANCH L3, L4, L5 #2;  Surgeon: Altagracia Gill MD;  [facility];  Service: Pain Management    • RHIZOTOMY Bilateral 04/14/2022    Procedure: RHIZOTOMY LUMBAR L3, L4, L5;  Surgeon: Altagracia Gill MD;  [facility];  Service: Pain Management    • RHIZOTOMY Bilateral 11/15/2022    Procedure: RHIZOTOMY LUMBAR L3, L4, L5;  Surgeon: Altagracia Gill MD;  [facility];  Service: Pain Management    • TOOTH EXTRACTION     • US GUIDED BREAST BIOPSY RIGHT COMPLETE Right 5/18/2022      Social History:     Social History     Socioeconomic History   • Marital status: /Civil Union     Spouse name: None   • Number of children: None   • Years of education: None   • Highest education level: None   Occupational History   • None   Tobacco Use   • Smoking status: Never   • Smokeless tobacco: Never   Vaping Use   • Vaping Use: Never used   Substance and Sexual Activity   • Alcohol use: Yes     Comment: social drinker   • Drug use: No   • Sexual activity: Yes     Partners: Male     Birth control/protection: None     Comment: hysterectomy   Other Topics Concern   • None   Social History Narrative   • None     Social Determinants of Health     Financial Resource Strain: Not on file   Food Insecurity: Not on file   Transportation Needs: Not on file   Physical Activity: Not on file   Stress: Not on file   Social Connections: Not on file   Intimate Partner Violence: Not on file   Housing Stability: Not on file      Family History:     Family History   Problem Relation Age of Onset   • Alzheimer's disease Mother    • Lung cancer Mother    • Heart disease Father         cardiac disorder   • Prostate cancer Father    • Breast cancer Sister 59   • No Known Problems Daughter    • Tongue cancer Maternal Grandmother    • Lung cancer Maternal Grandfather    • No Known Problems Paternal Grandmother    • Lung cancer Paternal Grandfather    • Emphysema Maternal Aunt    • Breast cancer Cousin 39   • No Known Problems Son    • No Known Problems Son    • No Known Problems Paternal Aunt    • No Known Problems Paternal Aunt    • No Known Problems Paternal Aunt       Current Medications:     Current Outpatient Medications   Medication Sig Dispense Refill   • Calcium-Vitamin D-Vitamin K (VIACTIV PO) Take by mouth daily     • cetirizine (ZyrTEC) 10 MG chewable tablet Chew 10 mg daily     • Cholecalciferol 50 MCG (2000 UT) CAPS Take by mouth daily     • desvenlafaxine succinate (PRISTIQ) 50 mg 24 hr tablet TAKE 1 TABLET DAILY 90 tablet 3   • estradiol (Vivelle-Dot) 0 05 MG/24HR Place 1 patch on the skin 2 (two) times a week 24 patch 1   • gabapentin (NEURONTIN) 300 mg capsule Take 1 capsule (300 mg total) by mouth 3 (three) times a day 270 capsule 0   • gentamicin (GARAMYCIN) 0 3 % ophthalmic solution Administer 1 drop "into the left eye every 4 (four) hours 5 mL 0   • Glucosamine-Chondroitin--200-150 MG TABS      • ibuprofen (MOTRIN) 600 mg tablet TAKE 1 TABLET EVERY 12 HOURS AS NEEDED FOR MODERATE PAIN 180 tablet 3   • metoprolol succinate (TOPROL-XL) 50 mg 24 hr tablet TAKE 1 TABLET DAILY 90 tablet 3   • Multiple Vitamin (MULTIVITAMIN) tablet Take 1 tablet by mouth daily     • pantoprazole (PROTONIX) 40 mg tablet Take 1 tablet (40 mg total) by mouth daily 90 tablet 2   • rosuvastatin (CRESTOR) 10 MG tablet TAKE 1 TABLET DAILY 90 tablet 3   • sodium hyaluronate (Durolane) 60 MG/3ML injection 3 mL     • spironolactone (ALDACTONE) 25 mg tablet TAKE 1 TABLET DAILY 90 tablet 3   • pantoprazole (PROTONIX) 40 mg tablet Take 1 tablet (40 mg total) by mouth daily for 7 days 7 tablet 0   • sucralfate (CARAFATE) 1 g tablet Take 1 tablet (1 g total) by mouth 4 (four) times a day (Patient not taking: Reported on 2023) 360 tablet 2   • sucralfate (CARAFATE) 1 g tablet Take 1 tablet (1 g total) by mouth 4 (four) times a day for 7 days 28 tablet 0     No current facility-administered medications for this visit  Allergies:      Allergies   Allergen Reactions   • Codeine Other (See Comments)     \"throat swells\"      Physical Exam:     /84 (BP Location: Left arm, Patient Position: Standing, Cuff Size: Standard)   Pulse 71   Temp (!) 96 8 °F (36 °C) (Temporal)   Ht 5' 8\" (1 727 m)   Wt 93 4 kg (206 lb)   SpO2 97%   BMI 31 32 kg/m²     Physical Exam     [facility]  "

## 2023-05-15 NOTE — RESULT ENCOUNTER NOTE
Call patient to notify normal results lab tests were good lipids were good thyroid was good chemistry panel was good keep up the good work check in 1 year so please fill in the cholesterol or lipid levels and the sugar on her port that toñito has at her desk and then get that faxed into her insurance then please keep the original at the  that she can

## 2023-06-05 NOTE — PROGRESS NOTES
"Assessment/Plan:    We discussed pt's elevated TC score in light of I research and reviewed that in early 50s in the estrogen only arm, the breast cancer rate decreased although transdermal estrogen was not studied  She is scheduled for a breast MRI and option of consult with Dr Redding's office given  She would like to continue on vivelle dot for another year and discussed weaning at that time  Risks of blood clot, stroke and MI reviewed as well  She will RTO in 3/2024 for routine gyn exam        Diagnoses and all orders for this visit:    Menopause  -     estradiol (Vivelle-Dot) 0 05 MG/24HR; Place 1 patch on the skin 2 (two) times a week        Subjective:      Patient ID: Jenny Ramírez is a 45 y o  female  Pt presents for HRT check  She was started on Vivelle dot 3/7/23 for VM sx  She is s/p hyst  She is doing very well on it, stating \"I feel like myself again  \" VM sx resolved  She would like to continue  No other gyn concerns  The following portions of the patient's history were reviewed and updated as appropriate: allergies, current medications, past family history, past medical history, past social history, past surgical history and problem list     Review of Systems   Constitutional: Negative  Respiratory: Negative  Cardiovascular: Negative  Gastrointestinal: Negative  Endocrine: Negative  Genitourinary: Negative for dyspareunia, dysuria, frequency, pelvic pain, urgency, vaginal bleeding, vaginal discharge and vaginal pain  Musculoskeletal: Negative  Skin: Negative  Neurological: Negative  Psychiatric/Behavioral: Negative  Objective:      /84   Pulse 81   Ht 5' 8\" (1 727 m)   Wt 92 4 kg (203 lb 12 8 oz)   BMI 30 99 kg/m²          Physical Exam  Vitals and nursing note reviewed  Constitutional:       Appearance: Normal appearance  HENT:      Head: Normocephalic and atraumatic     Pulmonary:      Effort: Pulmonary effort is normal    Musculoskeletal:       " General: Normal range of motion  Cervical back: Normal range of motion  Skin:     General: Skin is warm and dry  Neurological:      Mental Status: She is alert and oriented to person, place, and time  Psychiatric:         Mood and Affect: Mood normal          Behavior: Behavior normal          Thought Content:  Thought content normal          Judgment: Judgment normal

## 2023-06-06 ENCOUNTER — OFFICE VISIT (OUTPATIENT)
Dept: GYNECOLOGY | Facility: CLINIC | Age: 53
End: 2023-06-06
Payer: COMMERCIAL

## 2023-06-06 VITALS
HEART RATE: 81 BPM | BODY MASS INDEX: 30.89 KG/M2 | HEIGHT: 68 IN | WEIGHT: 203.8 LBS | SYSTOLIC BLOOD PRESSURE: 124 MMHG | DIASTOLIC BLOOD PRESSURE: 84 MMHG

## 2023-06-06 DIAGNOSIS — Z78.0 MENOPAUSE: ICD-10-CM

## 2023-06-06 PROCEDURE — 99212 OFFICE O/P EST SF 10 MIN: CPT | Performed by: OBSTETRICS & GYNECOLOGY

## 2023-06-06 RX ORDER — ESTRADIOL 0.05 MG/D
1 PATCH, EXTENDED RELEASE TRANSDERMAL 2 TIMES WEEKLY
Qty: 24 PATCH | Refills: 3 | Status: SHIPPED | OUTPATIENT
Start: 2023-06-08

## 2023-07-17 ENCOUNTER — CONSULT (OUTPATIENT)
Dept: GASTROENTEROLOGY | Facility: CLINIC | Age: 53
End: 2023-07-17
Payer: COMMERCIAL

## 2023-07-17 VITALS
HEART RATE: 85 BPM | SYSTOLIC BLOOD PRESSURE: 129 MMHG | BODY MASS INDEX: 30.77 KG/M2 | DIASTOLIC BLOOD PRESSURE: 88 MMHG | WEIGHT: 203 LBS | TEMPERATURE: 98.5 F | HEIGHT: 68 IN

## 2023-07-17 DIAGNOSIS — R13.19 OTHER DYSPHAGIA: ICD-10-CM

## 2023-07-17 DIAGNOSIS — K21.9 GASTROESOPHAGEAL REFLUX DISEASE WITHOUT ESOPHAGITIS: ICD-10-CM

## 2023-07-17 DIAGNOSIS — Z11.59 NEED FOR HEPATITIS C SCREENING TEST: ICD-10-CM

## 2023-07-17 DIAGNOSIS — K44.9 HIATAL HERNIA: ICD-10-CM

## 2023-07-17 DIAGNOSIS — K21.9 GASTROESOPHAGEAL REFLUX DISEASE, UNSPECIFIED WHETHER ESOPHAGITIS PRESENT: Primary | ICD-10-CM

## 2023-07-17 DIAGNOSIS — R79.89 ELEVATED LFTS: ICD-10-CM

## 2023-07-17 DIAGNOSIS — Z12.11 COLON CANCER SCREENING: ICD-10-CM

## 2023-07-17 PROCEDURE — 99204 OFFICE O/P NEW MOD 45 MIN: CPT | Performed by: STUDENT IN AN ORGANIZED HEALTH CARE EDUCATION/TRAINING PROGRAM

## 2023-07-17 RX ORDER — POLYETHYLENE GLYCOL 3350, SODIUM SULFATE ANHYDROUS, SODIUM BICARBONATE, SODIUM CHLORIDE, POTASSIUM CHLORIDE 236; 22.74; 6.74; 5.86; 2.97 G/4L; G/4L; G/4L; G/4L; G/4L
236 POWDER, FOR SOLUTION ORAL ONCE
Qty: 4000 ML | Refills: 0 | Status: SHIPPED | OUTPATIENT
Start: 2023-07-17 | End: 2023-07-17

## 2023-07-17 NOTE — PROGRESS NOTES
[facility]  Outpatient Consultation  Encounter: [id number]    PATIENT INFO     Name: Roxy Cancino  YOB: 1970   Age: 45 y.o. Sex: female   MRN: [id number]    ASSESSMENT & PLAN     Roxy Cancino is a 45 y.o. female with complaint of burning epigastric pain, intermittent dysphagia which may be both oropharyngeal and esophageal, finding of hiatal hernia on upper GI barium, elevated LFTs, and need for colon cancer screening. Her epigastric pain and dysphagia symptoms may be related to underlying hiatal hernia which was seen on upper GI imaging. There could certainly be a component of peptic ulcer disease related to ibuprofen use. Other potential etiologies for her pain include H. pylori infection versus gastritis versus IBS or functional disorder. Dysphagia may be related to the hiatal hernia itself versus peptic stricture versus EOE versus underlying dysmotility versus other. Fortunately, her symptoms appear to have responded well to Protonix which she remains on once daily at this time. She did have elevated AST and bilirubin on most recent blood work as well as on labs in the past.  Unclear if this is due to underlying fatty liver versus transient elevation related to medications versus infectious etiology versus other. Bilirubin elevation may be indicative of underlying Gilbert's. She is also due for colon cancer screening. She has never had CRC screening in the past.  No alarm symptoms. No family history of colon cancer or colon polyps. Not on antithrombotics or anticoagulants.     • We will schedule for EGD for evaluation of her epigastric pain and dysphagia symptoms with plans for esophageal, gastric, and duodenal biopsies to rule out EOE, H. pylori, and celiac sprue, respectively  • We will also schedule for colonoscopy for colon cancer screening  • GoLytely bowel prep  • Advised patient to continue Protonix 40 mg daily and recommended that she take it 30 minutes before breakfast  • Recommend minimizing NSAID use  • If endoscopic evaluation and biopsies are unremarkable, further evaluation with manometry may be reasonable  • Repeat CMP and bilirubin fractionation; if this shows persistent elevation, will proceed with further serologic work-up as well as RUQ US imaging vs elastography  • HCV antibody test for screening    1. Gastroesophageal reflux disease, unspecified whether esophagitis present    2. Gastroesophageal reflux disease without esophagitis    3. Other dysphagia    4. Hiatal hernia    5. Elevated LFTs    6. Colon cancer screening    7. Need for hepatitis C screening test      Orders Placed This Encounter   Procedures   • Comprehensive metabolic panel   • Bilirubin, direct   • Hepatitis C antibody   • EGD   • Colonoscopy       FOLLOW-UP: 6 months or sooner if needed    HISTORY OF PRESENT ILLNESS       Jas Richards is a 45 y.o. female who presents to GI office for complaint of epigastric abdominal pain described as burning, hiatal hernia seen on upper GI imaging, some intermittent dysphagia symptoms, elevated LFTs, and need for colon cancer screening. Her primary concern is in regards to her epigastric abdominal pain. This appears to be related to ibuprofen use for back pain which she was taking over the last year. She was taking Protonix for gastroprotection while taking ibuprofen but did have a period where she ran out of the Protonix and had worsening of her epigastric symptoms. Patient saw her PCP and underwent upper GI imaging which showed evidence of a hiatal hernia and GERD. For this reason, she was referred to GI for evaluation. She was also restarted on Protonix and she states that her symptoms have improved but she does still have times of intermittent epigastric burning. She denies any odynophagia, nausea, vomiting but does report intermittent dysphagia to both solids and liquids.   This appears to be both oropharyngeal and esophageal in nature as she reports coughing with swallowing but also with a sensation sometimes that food gets stuck. This often resolves spontaneously without any significant issues. She denies any melena and states that her use of ibuprofen is now infrequent. Additionally, patient is due for colon cancer screening. She has not had colon cancer screening in the past.  Denies any family history of colon cancer or colon polyps. Denies any alarming lower GI symptoms including hematochezia, melena, change in bowel habits, unintentional weight loss. She is not on any antithrombotics or anticoagulants. Based on review of her previous blood work, she was noted to have a mildly elevated AST and bilirubin periodically in the past.  She denies significant alcohol consumption or any known issues related to her liver. Apart from medications listed above, denies any new medications or OTC supplements.     PREVIOUS ENDOSCOPY: None  PREVIOUS COLONOSCOPY: None    REVIEW OF SYSTEMS     CONSTITUTIONAL: Denies any fever, chills, rigors, and weight loss  HEENT: No earache or tinnitus, denies hearing loss or visual disturbances  CARDIOVASCULAR: No chest pain or palpitations  RESPIRATORY: Denies any cough, hemoptysis, shortness of breath or dyspnea on exertion  GASTROINTESTINAL: As noted in the History of Present Illness  GENITOURINARY: No problems with urination, denies any hematuria or dysuria  NEUROLOGIC: No dizziness or vertigo, denies headaches   MUSCULOSKELETAL: + back pain   SKIN: Denies skin rashes or itching  ENDOCRINE: Denies excessive thirst, denies intolerance to heat or cold  PSYCHOSOCIAL: Denies depression or anxiety, denies any recent memory loss     Answers for HPI/ROS submitted by the patient on 7/14/2023  Chronicity: recurrent  Onset: more than 1 month ago  Onset quality: gradual  Frequency: intermittently  Episode duration: 5 Hours  Progression since onset: waxing and waning  Pain location: epigastric region  Pain - numeric: 3/10  Pain quality: burning, a sensation of fullness  Radiates to: epigastric region  anorexia: No  arthralgias: Yes  belching: Yes  constipation: Yes  diarrhea: No  dysuria: No  fever: No  flatus: No  frequency: No  headaches: No  hematochezia: No  hematuria: No  melena: No  myalgias: No  nausea:  No  weight loss: No  vomiting: No  Aggravated by: eating  Relieved by: nothing    Historical Information   Past Medical History:   Diagnosis Date   • Hyperlipidemia    • Hypertension    • Menopause ovarian failure      Past Surgical History:   Procedure Laterality Date   • BREAST BIOPSY Left 2009    core bx; benign    • BREAST BIOPSY Right 05/18/2022    US guided biopsy   • CHOLECYSTECTOMY  1995   • EPIDURAL BLOCK INJECTION Right 12/02/2021    Procedure: L4 and L5 TRANSFORAMINAL EPIDURAL STEROID INJECTION;  Surgeon: Lyndon Tolbert MD;  Location: [facility];  Service: Pain Management    • EPIDURAL BLOCK INJECTION N/A 12/28/2021    Procedure: BLOCK / INJECTION EPIDURAL STEROID LUMBAR L4-L5;  Surgeon: Lyndon Tolbert MD;  Location: [facility];  Service: Pain Management    • FL GUIDED NEEDLE PLAC BX/ASP/INJ  03/03/2022   • FL GUIDED NEEDLE PLAC BX/ASP/INJ  04/14/2022   • HYSTERECTOMY      age 26   • NERVE BLOCK Bilateral 02/15/2022    Procedure: BLOCK MEDIAL BRANCH L3, L4, L5 #1;  Surgeon: Lyndon Tolbert MD;  Location: [facility];  Service: Pain Management    • NERVE BLOCK Bilateral 03/03/2022    Procedure: BLOCK MEDIAL BRANCH L3, L4, L5 #2;  Surgeon: Lyndon Tolbert MD;  Location: [facility];  Service: Pain Management    • RHIZOTOMY Bilateral 04/14/2022    Procedure: RHIZOTOMY LUMBAR L3, L4, L5;  Surgeon: Lyndon Tolbert MD;  Location: [facility];  Service: Pain Management    • RHIZOTOMY Bilateral 11/15/2022    Procedure: RHIZOTOMY LUMBAR L3, L4, L5;  Surgeon: Lyndon Tolbert MD;  Location: [facility];  Service: Pain Management    • TOOTH EXTRACTION     • US GUIDED BREAST BIOPSY RIGHT COMPLETE Right 5/18/2022     Social History   Social History     Substance and Sexual Activity   Alcohol Use Yes   • Alcohol/week: 2.0 standard drinks of alcohol   • Types: 2 Glasses of wine per week    Comment: social drinker     Social History     Substance and Sexual Activity   Drug Use No     Social History     Tobacco Use   Smoking Status Never   Smokeless Tobacco Never     Family History   Problem Relation Age of Onset   • Alzheimer's disease Mother    • Lung cancer Mother    • Heart disease Father         cardiac disorder   • Prostate cancer Father    • Cancer Father    • Heart attack Father    • Breast cancer Sister    • Thyroid disease Sister    • Asthma Daughter    • Tongue cancer Maternal Grandmother    • Lung cancer Maternal Grandfather    • No Known Problems Paternal Grandmother    • Lung cancer Paternal Grandfather    • Emphysema Maternal Aunt    • Breast cancer Cousin 39   • No Known Problems Son    • No Known Problems Son    • No Known Problems Paternal Aunt    • No Known Problems Paternal Aunt    • No Known Problems Paternal Aunt    • Thyroid disease Daughter        MEDICATIONS & ALLERGIES     Current Outpatient Medications   Medication Instructions   • Calcium-Vitamin D-Vitamin K (VIACTIV PO) Oral, Daily   • cetirizine (ZYRTEC) 10 mg, Oral, Daily   • Cholecalciferol 50 MCG (2000 UT) CAPS Oral, Daily   • desvenlafaxine succinate (PRISTIQ) 50 mg 24 hr tablet TAKE 1 TABLET DAILY   • Linda 0.05 MG/24HR apply 1 patch two times a week as directed   • gabapentin (NEURONTIN) 300 mg, Oral, 3 times daily   • gentamicin (GARAMYCIN) 0.3 % ophthalmic solution 1 drop, Left Eye, Every 4 hours   • Glucosamine-Chondroitin--200-150 MG TABS No dose, route, or frequency recorded.    • ibuprofen (MOTRIN) 600 mg tablet TAKE 1 TABLET EVERY 12 HOURS AS NEEDED FOR MODERATE PAIN   • metoprolol succinate (TOPROL-XL) 50 mg 24 hr tablet TAKE 1 TABLET DAILY   • Multiple Vitamin (MULTIVITAMIN) tablet 1 tablet, Oral, Daily   • pantoprazole (PROTONIX) 40 mg, Oral, Daily   • pantoprazole (PROTONIX) 40 mg, Oral, Daily   • PEG 3350-KCl-NaBcb-NaCl-NaSulf (PEG-3350/Electrolytes) 236 g SOLR 236 g, Oral, Once   • rosuvastatin (CRESTOR) 10 MG tablet TAKE 1 TABLET DAILY   • sodium hyaluronate (Durolane) 60 MG/3ML injection 3 mL   • spironolactone (ALDACTONE) 25 mg tablet TAKE 1 TABLET DAILY   • sucralfate (CARAFATE) 1 g, Oral, 4 times daily   • sucralfate (CARAFATE) 1 g, Oral, 4 times daily     Allergies   Allergen Reactions   • Codeine Other (See Comments)     "throat swells"       PHYSICAL EXAM      Objective   Blood pressure 129/88, pulse 85, temperature 98.5 °F (36.9 °C), height 5' 8" (1.727 m), weight 92.1 kg (203 lb). Body mass index is 30.87 kg/m². General Appearance:   Alert, cooperative, no distress   HEENT:   Normocephalic, atraumatic, anicteric     Neck:   Supple, symmetrical, trachea midline   Lungs:   Equal chest rise, respirations unlabored    Heart:   Regular rate and rhythm   Abdomen:   Soft, mild discomfort on palpation in epigastrium, non-distended; normal bowel sounds; no masses, no organomegaly    Rectal:   Deferred    Extremities:   No cyanosis, clubbing or edema    Neuro: Moves all 4 extremities    Skin:   No jaundice, rashes, or lesions      LABS & IMAGING     No visits with results within 1 Day(s) from this visit.    Latest known visit with results is:   Lab on 05/12/2023   Component Date Value   • Cholesterol 05/12/2023 181    • Triglycerides 05/12/2023 158 (H)    • HDL, Direct 05/12/2023 63    • LDL Calculated 05/12/2023 86    • Non-HDL-Chol (CHOL-HDL) 05/12/2023 118    • Sodium 05/12/2023 136    • Potassium 05/12/2023 3.8    • Chloride 05/12/2023 106    • CO2 05/12/2023 27    • ANION GAP 05/12/2023 3 (L)    • BUN 05/12/2023 13    • Creatinine 05/12/2023 0.75    • Glucose, Fasting 05/12/2023 92    • Calcium 05/12/2023 9.5    • AST 05/12/2023 53 (H)    • ALT 05/12/2023 71    • Alkaline Phosphatase 05/12/2023 77    • Total Protein 05/12/2023 7.5    • Albumin 05/12/2023 4.0    • Total Bilirubin 05/12/2023 1.10 (H)    • eGFR 05/12/2023 90    • Vit D, 25-Hydroxy 05/12/2023 44.6      No results found. RADIOLOGY RESULTS: I have personally reviewed pertinent imaging studies. Seamus King D.O. [id number] [facility]  Division of Gastroenterology & Hepatology  Available on [email or web address]    ** Please Note: This note is constructed using a voice recognition dictation system.  **

## 2023-07-17 NOTE — PATIENT INSTRUCTIONS
We will schedule you for an upper endoscopy and colonoscopy  We will check blood work  Please get this done fasting  Please get this done at your earliest convenience  Continue taking Protonix  Take this 30 minutes before breakfast to get the best effect  Minimize use of ibuprofen

## 2023-07-17 NOTE — H&P (VIEW-ONLY)
[facility]  Outpatient Consultation  Encounter: [id number]    PATIENT INFO     Name: Brenda Wisdom  YOB: 1970   Age: 45 y.o. Sex: female   MRN: [id number]    ASSESSMENT & PLAN     Brenda Wisdom is a 45 y.o. female with complaint of burning epigastric pain, intermittent dysphagia which may be both oropharyngeal and esophageal, finding of hiatal hernia on upper GI barium, elevated LFTs, and need for colon cancer screening. Her epigastric pain and dysphagia symptoms may be related to underlying hiatal hernia which was seen on upper GI imaging. There could certainly be a component of peptic ulcer disease related to ibuprofen use. Other potential etiologies for her pain include H. pylori infection versus gastritis versus IBS or functional disorder. Dysphagia may be related to the hiatal hernia itself versus peptic stricture versus EOE versus underlying dysmotility versus other. Fortunately, her symptoms appear to have responded well to Protonix which she remains on once daily at this time. She did have elevated AST and bilirubin on most recent blood work as well as on labs in the past.  Unclear if this is due to underlying fatty liver versus transient elevation related to medications versus infectious etiology versus other. Bilirubin elevation may be indicative of underlying Gilbert's. She is also due for colon cancer screening. She has never had CRC screening in the past.  No alarm symptoms. No family history of colon cancer or colon polyps. Not on antithrombotics or anticoagulants.     • We will schedule for EGD for evaluation of her epigastric pain and dysphagia symptoms with plans for esophageal, gastric, and duodenal biopsies to rule out EOE, H. pylori, and celiac sprue, respectively  • We will also schedule for colonoscopy for colon cancer screening  • GoLytely bowel prep  • Advised patient to continue Protonix 40 mg daily and recommended that she take it 30 minutes before breakfast  • Recommend minimizing NSAID use  • If endoscopic evaluation and biopsies are unremarkable, further evaluation with manometry may be reasonable  • Repeat CMP and bilirubin fractionation; if this shows persistent elevation, will proceed with further serologic work-up as well as RUQ US imaging vs elastography  • HCV antibody test for screening    1. Gastroesophageal reflux disease, unspecified whether esophagitis present    2. Gastroesophageal reflux disease without esophagitis    3. Other dysphagia    4. Hiatal hernia    5. Elevated LFTs    6. Colon cancer screening    7. Need for hepatitis C screening test      Orders Placed This Encounter   Procedures   • Comprehensive metabolic panel   • Bilirubin, direct   • Hepatitis C antibody   • EGD   • Colonoscopy       FOLLOW-UP: 6 months or sooner if needed    HISTORY OF PRESENT ILLNESS       Annia White is a 45 y.o. female who presents to GI office for complaint of epigastric abdominal pain described as burning, hiatal hernia seen on upper GI imaging, some intermittent dysphagia symptoms, elevated LFTs, and need for colon cancer screening. Her primary concern is in regards to her epigastric abdominal pain. This appears to be related to ibuprofen use for back pain which she was taking over the last year. She was taking Protonix for gastroprotection while taking ibuprofen but did have a period where she ran out of the Protonix and had worsening of her epigastric symptoms. Patient saw her PCP and underwent upper GI imaging which showed evidence of a hiatal hernia and GERD. For this reason, she was referred to GI for evaluation. She was also restarted on Protonix and she states that her symptoms have improved but she does still have times of intermittent epigastric burning. She denies any odynophagia, nausea, vomiting but does report intermittent dysphagia to both solids and liquids.   This appears to be both oropharyngeal and esophageal in nature as she reports coughing with swallowing but also with a sensation sometimes that food gets stuck. This often resolves spontaneously without any significant issues. She denies any melena and states that her use of ibuprofen is now infrequent. Additionally, patient is due for colon cancer screening. She has not had colon cancer screening in the past.  Denies any family history of colon cancer or colon polyps. Denies any alarming lower GI symptoms including hematochezia, melena, change in bowel habits, unintentional weight loss. She is not on any antithrombotics or anticoagulants. Based on review of her previous blood work, she was noted to have a mildly elevated AST and bilirubin periodically in the past.  She denies significant alcohol consumption or any known issues related to her liver. Apart from medications listed above, denies any new medications or OTC supplements.     PREVIOUS ENDOSCOPY: None  PREVIOUS COLONOSCOPY: None    REVIEW OF SYSTEMS     CONSTITUTIONAL: Denies any fever, chills, rigors, and weight loss  HEENT: No earache or tinnitus, denies hearing loss or visual disturbances  CARDIOVASCULAR: No chest pain or palpitations  RESPIRATORY: Denies any cough, hemoptysis, shortness of breath or dyspnea on exertion  GASTROINTESTINAL: As noted in the History of Present Illness  GENITOURINARY: No problems with urination, denies any hematuria or dysuria  NEUROLOGIC: No dizziness or vertigo, denies headaches   MUSCULOSKELETAL: + back pain   SKIN: Denies skin rashes or itching  ENDOCRINE: Denies excessive thirst, denies intolerance to heat or cold  PSYCHOSOCIAL: Denies depression or anxiety, denies any recent memory loss     Answers for HPI/ROS submitted by the patient on 7/14/2023  Chronicity: recurrent  Onset: more than 1 month ago  Onset quality: gradual  Frequency: intermittently  Episode duration: 5 Hours  Progression since onset: waxing and waning  Pain location: epigastric region  Pain - numeric: 3/10  Pain quality: burning, a sensation of fullness  Radiates to: epigastric region  anorexia: No  arthralgias: Yes  belching: Yes  constipation: Yes  diarrhea: No  dysuria: No  fever: No  flatus: No  frequency: No  headaches: No  hematochezia: No  hematuria: No  melena: No  myalgias: No  nausea:  No  weight loss: No  vomiting: No  Aggravated by: eating  Relieved by: nothing    Historical Information   Past Medical History:   Diagnosis Date   • Hyperlipidemia    • Hypertension    • Menopause ovarian failure      Past Surgical History:   Procedure Laterality Date   • BREAST BIOPSY Left 2009    core bx; benign    • BREAST BIOPSY Right 05/18/2022    US guided biopsy   • CHOLECYSTECTOMY  1995   • EPIDURAL BLOCK INJECTION Right 12/02/2021    Procedure: L4 and L5 TRANSFORAMINAL EPIDURAL STEROID INJECTION;  Surgeon: Alanna Mascorro MD;  Location: [facility];  Service: Pain Management    • EPIDURAL BLOCK INJECTION N/A 12/28/2021    Procedure: BLOCK / INJECTION EPIDURAL STEROID LUMBAR L4-L5;  Surgeon: Alanna Mascorro MD;  Location: [facility];  Service: Pain Management    • FL GUIDED NEEDLE PLAC BX/ASP/INJ  03/03/2022   • FL GUIDED NEEDLE PLAC BX/ASP/INJ  04/14/2022   • HYSTERECTOMY      age 26   • NERVE BLOCK Bilateral 02/15/2022    Procedure: BLOCK MEDIAL BRANCH L3, L4, L5 #1;  Surgeon: Alanna Mascorro MD;  Location: [facility];  Service: Pain Management    • NERVE BLOCK Bilateral 03/03/2022    Procedure: BLOCK MEDIAL BRANCH L3, L4, L5 #2;  Surgeon: Alanna Mascorro MD;  [facility];  Service: Pain Management    • RHIZOTOMY Bilateral 04/14/2022    Procedure: RHIZOTOMY LUMBAR L3, L4, L5;  Surgeon: Alanna Mascorro MD;  [facility];  Service: Pain Management    • RHIZOTOMY Bilateral 11/15/2022    Procedure: RHIZOTOMY LUMBAR L3, L4, L5;  Surgeon: Alanna Mascorro MD;  [facility];  Service: Pain Management    • TOOTH EXTRACTION     • US GUIDED BREAST BIOPSY RIGHT COMPLETE Right 5/18/2022     Social History   Social History     Substance and Sexual Activity   Alcohol Use Yes   • Alcohol/week: 2.0 standard drinks of alcohol   • Types: 2 Glasses of wine per week    Comment: social drinker     Social History     Substance and Sexual Activity   Drug Use No     Social History     Tobacco Use   Smoking Status Never   Smokeless Tobacco Never     Family History   Problem Relation Age of Onset   • Alzheimer's disease Mother    • Lung cancer Mother    • Heart disease Father         cardiac disorder   • Prostate cancer Father    • Cancer Father    • Heart attack Father    • Breast cancer Sister    • Thyroid disease Sister    • Asthma Daughter    • Tongue cancer Maternal Grandmother    • Lung cancer Maternal Grandfather    • No Known Problems Paternal Grandmother    • Lung cancer Paternal Grandfather    • Emphysema Maternal Aunt    • Breast cancer Cousin 39   • No Known Problems Son    • No Known Problems Son    • No Known Problems Paternal Aunt    • No Known Problems Paternal Aunt    • No Known Problems Paternal Aunt    • Thyroid disease Daughter        MEDICATIONS & ALLERGIES     Current Outpatient Medications   Medication Instructions   • Calcium-Vitamin D-Vitamin K (VIACTIV PO) Oral, Daily   • cetirizine (ZYRTEC) 10 mg, Oral, Daily   • Cholecalciferol 50 MCG (2000 UT) CAPS Oral, Daily   • desvenlafaxine succinate (PRISTIQ) 50 mg 24 hr tablet TAKE 1 TABLET DAILY   • Linda 0.05 MG/24HR apply 1 patch two times a week as directed   • gabapentin (NEURONTIN) 300 mg, Oral, 3 times daily   • gentamicin (GARAMYCIN) 0.3 % ophthalmic solution 1 drop, Left Eye, Every 4 hours   • Glucosamine-Chondroitin--200-150 MG TABS No dose, route, or frequency recorded.    • ibuprofen (MOTRIN) 600 mg tablet TAKE 1 TABLET EVERY 12 HOURS AS NEEDED FOR MODERATE PAIN   • metoprolol succinate (TOPROL-XL) 50 mg 24 hr tablet TAKE 1 TABLET DAILY   • Multiple Vitamin (MULTIVITAMIN) tablet 1 tablet, Oral, Daily   • pantoprazole (PROTONIX) 40 mg, Oral, Daily   • pantoprazole (PROTONIX) 40 mg, Oral, Daily   • PEG 3350-KCl-NaBcb-NaCl-NaSulf (PEG-3350/Electrolytes) 236 g SOLR 236 g, Oral, Once   • rosuvastatin (CRESTOR) 10 MG tablet TAKE 1 TABLET DAILY   • sodium hyaluronate (Durolane) 60 MG/3ML injection 3 mL   • spironolactone (ALDACTONE) 25 mg tablet TAKE 1 TABLET DAILY   • sucralfate (CARAFATE) 1 g, Oral, 4 times daily   • sucralfate (CARAFATE) 1 g, Oral, 4 times daily     Allergies   Allergen Reactions   • Codeine Other (See Comments)     "throat swells"       PHYSICAL EXAM      Objective   Blood pressure 129/88, pulse 85, temperature 98.5 °F (36.9 °C), height 5' 8" (1.727 m), weight 92.1 kg (203 lb). Body mass index is 30.87 kg/m². General Appearance:   Alert, cooperative, no distress   HEENT:   Normocephalic, atraumatic, anicteric     Neck:   Supple, symmetrical, trachea midline   Lungs:   Equal chest rise, respirations unlabored    Heart:   Regular rate and rhythm   Abdomen:   Soft, mild discomfort on palpation in epigastrium, non-distended; normal bowel sounds; no masses, no organomegaly    Rectal:   Deferred    Extremities:   No cyanosis, clubbing or edema    Neuro: Moves all 4 extremities    Skin:   No jaundice, rashes, or lesions      LABS & IMAGING     No visits with results within 1 Day(s) from this visit.    Latest known visit with results is:   Lab on 05/12/2023   Component Date Value   • Cholesterol 05/12/2023 181    • Triglycerides 05/12/2023 158 (H)    • HDL, Direct 05/12/2023 63    • LDL Calculated 05/12/2023 86    • Non-HDL-Chol (CHOL-HDL) 05/12/2023 118    • Sodium 05/12/2023 136    • Potassium 05/12/2023 3.8    • Chloride 05/12/2023 106    • CO2 05/12/2023 27    • ANION GAP 05/12/2023 3 (L)    • BUN 05/12/2023 13    • Creatinine 05/12/2023 0.75    • Glucose, Fasting 05/12/2023 92    • Calcium 05/12/2023 9.5    • AST 05/12/2023 53 (H)    • ALT 05/12/2023 71    • Alkaline Phosphatase 05/12/2023 77    • Total Protein 05/12/2023 7.5    • Albumin 05/12/2023 4.0    • Total Bilirubin 05/12/2023 1.10 (H)    • eGFR 05/12/2023 90    • Vit D, 25-Hydroxy 05/12/2023 44.6      No results found. RADIOLOGY RESULTS: I have personally reviewed pertinent imaging studies. Lauren Ricks D.O. [id number] [facility]  Division of Gastroenterology & Hepatology  Available on [email or web address]    ** Please Note: This note is constructed using a voice recognition dictation system.  **

## 2023-07-18 ENCOUNTER — APPOINTMENT (OUTPATIENT)
Dept: LAB | Facility: MEDICAL CENTER | Age: 53
End: 2023-07-18
Payer: COMMERCIAL

## 2023-07-18 DIAGNOSIS — Z11.59 NEED FOR HEPATITIS C SCREENING TEST: ICD-10-CM

## 2023-07-18 DIAGNOSIS — R79.89 ELEVATED LFTS: ICD-10-CM

## 2023-07-18 LAB
ALBUMIN SERPL BCP-MCNC: 3.8 G/DL (ref 3.5–5)
ALP SERPL-CCNC: 81 U/L (ref 46–116)
ALT SERPL W P-5'-P-CCNC: 74 U/L (ref 12–78)
ANION GAP SERPL CALCULATED.3IONS-SCNC: 3 MMOL/L
AST SERPL W P-5'-P-CCNC: 43 U/L (ref 5–45)
BILIRUB DIRECT SERPL-MCNC: 0.2 MG/DL (ref 0–0.2)
BILIRUB SERPL-MCNC: 0.95 MG/DL (ref 0.2–1)
BUN SERPL-MCNC: 13 MG/DL (ref 5–25)
CALCIUM SERPL-MCNC: 9.5 MG/DL (ref 8.3–10.1)
CHLORIDE SERPL-SCNC: 110 MMOL/L (ref 96–108)
CO2 SERPL-SCNC: 25 MMOL/L (ref 21–32)
CREAT SERPL-MCNC: 0.73 MG/DL (ref 0.6–1.3)
GFR SERPL CREATININE-BSD FRML MDRD: 93 ML/MIN/1.73SQ M
GLUCOSE P FAST SERPL-MCNC: 98 MG/DL (ref 65–99)
POTASSIUM SERPL-SCNC: 3.8 MMOL/L (ref 3.5–5.3)
PROT SERPL-MCNC: 7.5 G/DL (ref 6.4–8.4)
SODIUM SERPL-SCNC: 138 MMOL/L (ref 135–147)

## 2023-07-18 PROCEDURE — 36415 COLL VENOUS BLD VENIPUNCTURE: CPT

## 2023-07-18 PROCEDURE — 80053 COMPREHEN METABOLIC PANEL: CPT

## 2023-07-18 PROCEDURE — 82248 BILIRUBIN DIRECT: CPT

## 2023-07-18 PROCEDURE — 86803 HEPATITIS C AB TEST: CPT

## 2023-07-19 LAB — HCV AB SER QL: NORMAL

## 2023-08-11 ENCOUNTER — ANESTHESIA EVENT (OUTPATIENT)
Dept: PERIOP | Facility: HOSPITAL | Age: 53
End: 2023-08-11

## 2023-08-11 ENCOUNTER — ANESTHESIA (OUTPATIENT)
Dept: PERIOP | Facility: HOSPITAL | Age: 53
End: 2023-08-11

## 2023-08-11 ENCOUNTER — HOSPITAL ENCOUNTER (OUTPATIENT)
Dept: PERIOP | Facility: HOSPITAL | Age: 53
Setting detail: OUTPATIENT SURGERY
End: 2023-08-11
Attending: STUDENT IN AN ORGANIZED HEALTH CARE EDUCATION/TRAINING PROGRAM
Payer: COMMERCIAL

## 2023-08-11 VITALS
SYSTOLIC BLOOD PRESSURE: 123 MMHG | HEART RATE: 71 BPM | DIASTOLIC BLOOD PRESSURE: 67 MMHG | TEMPERATURE: 97.6 F | OXYGEN SATURATION: 96 % | HEIGHT: 68 IN | BODY MASS INDEX: 30.77 KG/M2 | WEIGHT: 203 LBS | RESPIRATION RATE: 18 BRPM

## 2023-08-11 DIAGNOSIS — K21.9 GASTROESOPHAGEAL REFLUX DISEASE, UNSPECIFIED WHETHER ESOPHAGITIS PRESENT: ICD-10-CM

## 2023-08-11 DIAGNOSIS — K44.9 HIATAL HERNIA: ICD-10-CM

## 2023-08-11 DIAGNOSIS — Z12.11 COLON CANCER SCREENING: ICD-10-CM

## 2023-08-11 DIAGNOSIS — R13.19 OTHER DYSPHAGIA: ICD-10-CM

## 2023-08-11 PROCEDURE — 88305 TISSUE EXAM BY PATHOLOGIST: CPT | Performed by: PATHOLOGY

## 2023-08-11 RX ORDER — SODIUM CHLORIDE, SODIUM LACTATE, POTASSIUM CHLORIDE, CALCIUM CHLORIDE 600; 310; 30; 20 MG/100ML; MG/100ML; MG/100ML; MG/100ML
75 INJECTION, SOLUTION INTRAVENOUS CONTINUOUS
Status: DISCONTINUED | OUTPATIENT
Start: 2023-08-11 | End: 2023-08-15 | Stop reason: HOSPADM

## 2023-08-11 RX ORDER — PROPOFOL 10 MG/ML
INJECTION, EMULSION INTRAVENOUS CONTINUOUS PRN
Status: DISCONTINUED | OUTPATIENT
Start: 2023-08-11 | End: 2023-08-11

## 2023-08-11 RX ORDER — LIDOCAINE HYDROCHLORIDE 20 MG/ML
INJECTION, SOLUTION EPIDURAL; INFILTRATION; INTRACAUDAL; PERINEURAL AS NEEDED
Status: DISCONTINUED | OUTPATIENT
Start: 2023-08-11 | End: 2023-08-11

## 2023-08-11 RX ORDER — PROPOFOL 10 MG/ML
INJECTION, EMULSION INTRAVENOUS AS NEEDED
Status: DISCONTINUED | OUTPATIENT
Start: 2023-08-11 | End: 2023-08-11

## 2023-08-11 RX ORDER — PHENYLEPHRINE HCL IN 0.9% NACL 1 MG/10 ML
SYRINGE (ML) INTRAVENOUS AS NEEDED
Status: DISCONTINUED | OUTPATIENT
Start: 2023-08-11 | End: 2023-08-11

## 2023-08-11 RX ADMIN — SODIUM CHLORIDE, SODIUM LACTATE, POTASSIUM CHLORIDE, AND CALCIUM CHLORIDE: .6; .31; .03; .02 INJECTION, SOLUTION INTRAVENOUS at 08:30

## 2023-08-11 RX ADMIN — PROPOFOL 50 MG: 10 INJECTION, EMULSION INTRAVENOUS at 09:07

## 2023-08-11 RX ADMIN — PROPOFOL 100 MG: 10 INJECTION, EMULSION INTRAVENOUS at 09:03

## 2023-08-11 RX ADMIN — SODIUM CHLORIDE, SODIUM LACTATE, POTASSIUM CHLORIDE, AND CALCIUM CHLORIDE 75 ML/HR: .6; .31; .03; .02 INJECTION, SOLUTION INTRAVENOUS at 08:31

## 2023-08-11 RX ADMIN — PROPOFOL 50 MG: 10 INJECTION, EMULSION INTRAVENOUS at 09:11

## 2023-08-11 RX ADMIN — LIDOCAINE HYDROCHLORIDE 100 MG: 20 INJECTION, SOLUTION EPIDURAL; INFILTRATION; INTRACAUDAL at 09:03

## 2023-08-11 RX ADMIN — PROPOFOL 50 MG: 10 INJECTION, EMULSION INTRAVENOUS at 09:05

## 2023-08-11 RX ADMIN — PROPOFOL 100 MCG/KG/MIN: 10 INJECTION, EMULSION INTRAVENOUS at 09:14

## 2023-08-11 RX ADMIN — PROPOFOL 50 MG: 10 INJECTION, EMULSION INTRAVENOUS at 09:04

## 2023-08-11 RX ADMIN — Medication 100 MCG: at 09:16

## 2023-08-11 RX ADMIN — PROPOFOL 50 MG: 10 INJECTION, EMULSION INTRAVENOUS at 09:15

## 2023-08-11 RX ADMIN — PROPOFOL 50 MG: 10 INJECTION, EMULSION INTRAVENOUS at 09:09

## 2023-08-11 NOTE — ANESTHESIA POSTPROCEDURE EVALUATION
Post-Op Assessment Note    CV Status:  Stable  Pain Score: 0    Pain management: adequate     Mental Status:  Alert and awake   Hydration Status:  Euvolemic   PONV Controlled:  Controlled   Airway Patency:  Patent      Post Op Vitals Reviewed: Yes      Staff: Anesthesiologist, CRNA         No notable events documented.     /68 (08/11/23 0944)    Temp 97.6 °F (36.4 °C) (08/11/23 0944)    Pulse 70 (08/11/23 0944)   Resp 20 (08/11/23 0944)    SpO2 97 % (08/11/23 0944)

## 2023-08-11 NOTE — ANESTHESIA PREPROCEDURE EVALUATION
Procedure:  EGD  COLONOSCOPY    Relevant Problems   CARDIO   (+) Hypertension   (+) Mixed hyperlipidemia      GI/HEPATIC   (+) Gastroesophageal reflux disease   (+) Hiatal hernia   (+) Other dysphagia      MUSCULOSKELETAL   (+) Hiatal hernia      NEURO/PSYCH   (+) Anxiety        Physical Exam    Airway    Mallampati score: II  TM Distance: >3 FB  Neck ROM: full     Dental   No notable dental hx     Cardiovascular  Cardiovascular exam normal    Pulmonary  Pulmonary exam normal     Other Findings        Anesthesia Plan  ASA Score- 2     Anesthesia Type- IV sedation with anesthesia with ASA Monitors. Additional Monitors:   Airway Plan:           Plan Factors-Exercise tolerance (METS): >4 METS. Chart reviewed. EKG reviewed. Imaging results reviewed. Existing labs reviewed. Patient summary reviewed. Patient is not a current smoker. Induction- intravenous. Postoperative Plan-     Informed Consent- Anesthetic plan and risks discussed with patient. I personally reviewed this patient with the CRNA. Discussed and agreed on the Anesthesia Plan with the CRNA. Janna Sarah

## 2023-08-11 NOTE — INTERVAL H&P NOTE
H&P reviewed. After examining the patient I find no changes in the patients condition since the H&P had been written.     Vitals:    08/11/23 0750   BP: 139/81   Pulse: 73   Resp: 18   Temp: 97.9 °F (36.6 °C)   SpO2: 97%

## 2023-08-18 PROCEDURE — 88305 TISSUE EXAM BY PATHOLOGIST: CPT | Performed by: PATHOLOGY

## 2023-08-23 DIAGNOSIS — I10 ESSENTIAL HYPERTENSION: ICD-10-CM

## 2023-08-23 RX ORDER — SPIRONOLACTONE 25 MG/1
TABLET ORAL
Qty: 90 TABLET | Refills: 3 | Status: SHIPPED | OUTPATIENT
Start: 2023-08-23

## 2023-09-15 PROBLEM — Z12.11 COLON CANCER SCREENING: Status: RESOLVED | Noted: 2023-07-17 | Resolved: 2023-09-15

## 2023-09-15 PROBLEM — Z11.59 NEED FOR HEPATITIS C SCREENING TEST: Status: RESOLVED | Noted: 2023-07-17 | Resolved: 2023-09-15

## 2023-09-18 DIAGNOSIS — I10 PRIMARY HYPERTENSION: ICD-10-CM

## 2023-09-18 RX ORDER — METOPROLOL SUCCINATE 50 MG/1
TABLET, EXTENDED RELEASE ORAL
Qty: 90 TABLET | Refills: 3 | Status: SHIPPED | OUTPATIENT
Start: 2023-09-18

## 2023-10-02 ENCOUNTER — OFFICE VISIT (OUTPATIENT)
Dept: GASTROENTEROLOGY | Facility: CLINIC | Age: 53
End: 2023-10-02
Payer: COMMERCIAL

## 2023-10-02 VITALS
WEIGHT: 203 LBS | TEMPERATURE: 97.9 F | DIASTOLIC BLOOD PRESSURE: 76 MMHG | OXYGEN SATURATION: 99 % | HEIGHT: 68 IN | HEART RATE: 67 BPM | BODY MASS INDEX: 30.77 KG/M2 | SYSTOLIC BLOOD PRESSURE: 128 MMHG

## 2023-10-02 DIAGNOSIS — K21.9 GASTROESOPHAGEAL REFLUX DISEASE WITHOUT ESOPHAGITIS: ICD-10-CM

## 2023-10-02 DIAGNOSIS — K44.9 HIATAL HERNIA: ICD-10-CM

## 2023-10-02 DIAGNOSIS — R13.12 OROPHARYNGEAL DYSPHAGIA: Primary | ICD-10-CM

## 2023-10-02 PROBLEM — R79.89 ELEVATED LFTS: Status: RESOLVED | Noted: 2023-07-17 | Resolved: 2023-10-02

## 2023-10-02 PROBLEM — R13.19 OTHER DYSPHAGIA: Status: RESOLVED | Noted: 2023-07-17 | Resolved: 2023-10-02

## 2023-10-02 PROCEDURE — 99213 OFFICE O/P EST LOW 20 MIN: CPT | Performed by: STUDENT IN AN ORGANIZED HEALTH CARE EDUCATION/TRAINING PROGRAM

## 2023-10-02 RX ORDER — PANTOPRAZOLE SODIUM 40 MG/1
40 TABLET, DELAYED RELEASE ORAL DAILY
Qty: 90 TABLET | Refills: 2 | Status: SHIPPED | OUTPATIENT
Start: 2023-10-02 | End: 2023-10-03 | Stop reason: SDUPTHER

## 2023-10-02 NOTE — PATIENT INSTRUCTIONS
WE WILL ORDER A VIDEO BARIUM SWALLOW STUDY TO FURTHER ASSESS YOUR SWALLOWING ISSUES  CONTINUE TAKING THE [address]

## 2023-10-02 NOTE — PROGRESS NOTES
[facility]  Outpatient Follow-up  Encounter: [id number]    PATIENT INFO     Name: Franco Stevenson  YOB: 1970   Age: 45 y.o. Sex: female   MRN: [id number]    ASSESSMENT & PLAN     Franco Stevenson is a 45 y.o. female with oropharyngeal dysphagia, GERD, hiatal hernia. She continues to experience symptoms which are likely oropharyngeal in nature based on her reports of coughing with swallowing. Her upper endoscopy was unremarkable without any structural lesions identified. While she does have hiatal hernia which was seen on barium swallow, this was not clearly evident on endoscopy. It is likely contributing to her reflux symptoms and dependence on Protonix for symptomatic control. Low suspicion for any underlying dysmotility as her dysphagia symptoms seem to be supraesophageal.    • Check video barium swallow to assess for oropharyngeal dysphagia  • Depending on findings, will recommend referral to speech therapy and possibly ENT if necessary  • Continue Protonix 40 mg daily; send refill per patient request  • Given low suspicion for dysmotility, would defer manometry testing at this time    1. Oropharyngeal dysphagia    2. Gastroesophageal reflux disease without esophagitis    3. Hiatal hernia      Orders Placed This Encounter   Procedures   • FL barium swallow video w speech       FOLLOW-UP: 3 months    HISTORY OF PRESENT ILLNESS       Franco Stevenson is a 45 y.o. female who presents to GI office for follow-up. Fortunately, her abdominal pain symptoms have improved. However, she continues to have issues with dysphagia. She reports significant coughing with swallowing feeling that when she swallows goes down the wrong tube. Denies any sensation that food is getting stuck in her chest.  She does continue to experience heartburn if she misses taking Protonix. Her symptoms are otherwise well controlled when taking Protonix. No other acute GI complaints.      ENDOSCOPIC HISTORY UPPER ENDOSCOPY: 8/11/2023 with normal esophagus, stomach, and duodenum (biopsies negative)    COLONOSCOPY: 8/11/2023 with moderate a sending and transverse colon diverticulosis, small internal hemorrhoids, subcentimeter cecal polyp was benign tissue; recommend recall colonoscopy in 10 years    REVIEW OF SYSTEMS     CONSTITUTIONAL: Denies any fever, chills, rigors, and weight loss  HEENT: No earache or tinnitus, denies hearing loss or visual disturbances  CARDIOVASCULAR: No chest pain or palpitations  RESPIRATORY: Denies any cough, hemoptysis, shortness of breath or dyspnea on exertion  GASTROINTESTINAL: As noted in the History of Present Illness  GENITOURINARY: No problems with urination, denies any hematuria or dysuria  NEUROLOGIC: No dizziness or vertigo, denies headaches   MUSCULOSKELETAL: Denies any muscle or joint pain   SKIN: Denies skin rashes or itching  ENDOCRINE: Denies excessive thirst, denies intolerance to heat or cold  PSYCHOSOCIAL: Denies depression or anxiety, denies any recent memory loss     Historical Information   Past Medical History:   Diagnosis Date   • Hyperlipidemia    • Hypertension    • Menopause ovarian failure      Past Surgical History:   Procedure Laterality Date   • BREAST BIOPSY Left 2009    core bx; benign    • BREAST BIOPSY Right 05/18/2022    US guided biopsy   • CHOLECYSTECTOMY  1995   • EPIDURAL BLOCK INJECTION Right 12/02/2021    Procedure: L4 and L5 TRANSFORAMINAL EPIDURAL STEROID INJECTION;  Surgeon: Oriana Cassidy MD;  [facility];  Service: Pain Management    • EPIDURAL BLOCK INJECTION N/A 12/28/2021    Procedure: BLOCK / INJECTION EPIDURAL STEROID LUMBAR L4-L5;  Surgeon: Oriana Cassidy MD;  Location[facility];  Service: Pain Management    • FL GUIDED NEEDLE PLAC BX/ASP/INJ  03/03/2022   • FL GUIDED NEEDLE PLAC BX/ASP/INJ  04/14/2022   • HYSTERECTOMY      age 26   • NERVE BLOCK Bilateral 02/15/2022    Procedure: BLOCK MEDIAL BRANCH L3, L4, L5 #1;  Surgeon: aDnielle Barber MD;  [facility]: [facility];  Service: Pain Management    • NERVE BLOCK Bilateral 03/03/2022    Procedure: BLOCK MEDIAL BRANCH L3, L4, L5 #2;  Surgeon: Danielle Barber MD;  [facility];  Service: Pain Management    • RHIZOTOMY Bilateral 04/14/2022    Procedure: RHIZOTOMY LUMBAR L3, L4, L5;  Surgeon: Danielle Barber MD;  [facility];  Service: Pain Management    • RHIZOTOMY Bilateral 11/15/2022    Procedure: RHIZOTOMY LUMBAR L3, L4, L5;  Surgeon: Danielle Barber MD;  [facility];  Service: Pain Management    • TOOTH EXTRACTION     • US GUIDED BREAST BIOPSY RIGHT COMPLETE Right 5/18/2022     Social History   Social History     Substance and Sexual Activity   Alcohol Use Yes   • Alcohol/week: 2.0 standard drinks of alcohol   • Types: 2 Glasses of wine per week    Comment: social drinker     Social History     Substance and Sexual Activity   Drug Use No     Social History     Tobacco Use   Smoking Status Never   Smokeless Tobacco Never     Family History   Problem Relation Age of Onset   • Alzheimer's disease Mother    • Lung cancer Mother    • Heart disease Father         cardiac disorder   • Prostate cancer Father    • Cancer Father    • Heart attack Father    • Breast cancer Sister    • Thyroid disease Sister    • Asthma Daughter    • Tongue cancer Maternal Grandmother    • Lung cancer Maternal Grandfather    • No Known Problems Paternal Grandmother    • Lung cancer Paternal Grandfather    • Emphysema Maternal Aunt    • Breast cancer Cousin 39   • No Known Problems Son    • No Known Problems Son    • No Known Problems Paternal Aunt    • No Known Problems Paternal Aunt    • No Known Problems Paternal Aunt    • Thyroid disease Daughter        MEDICATIONS & ALLERGIES     Current Outpatient Medications   Medication Instructions   • Calcium-Vitamin D-Vitamin K (VIACTIV PO) Oral, Daily   • cetirizine (ZYRTEC) 10 mg, Oral, Daily   • Cholecalciferol 50 MCG (2000 UT) CAPS Oral, Daily   • desvenlafaxine succinate (PRISTIQ) 50 mg 24 hr tablet TAKE 1 TABLET DAILY   • Linda 0.05 MG/24HR apply 1 patch two times a week as directed   • gabapentin (NEURONTIN) 300 mg, Oral, 3 times daily   • gentamicin (GARAMYCIN) 0.3 % ophthalmic solution 1 drop, Left Eye, Every 4 hours   • Glucosamine-Chondroitin--200-150 MG TABS No dose, route, or frequency recorded. • ibuprofen (MOTRIN) 600 mg tablet TAKE 1 TABLET EVERY 12 HOURS AS NEEDED FOR MODERATE PAIN   • metoprolol succinate (TOPROL-XL) 50 mg 24 hr tablet TAKE 1 TABLET DAILY   • Multiple Vitamin (MULTIVITAMIN) tablet 1 tablet, Oral, Daily   • pantoprazole (PROTONIX) 40 mg, Oral, Daily   • rosuvastatin (CRESTOR) 10 MG tablet TAKE 1 TABLET DAILY   • sodium hyaluronate (Durolane) 60 MG/3ML injection 3 mL   • spironolactone (ALDACTONE) 25 mg tablet TAKE 1 TABLET DAILY     Allergies   Allergen Reactions   • Codeine Other (See Comments)     "throat swells"       PHYSICAL EXAM      Objective   Blood pressure 128/76, pulse 67, temperature 97.9 °F (36.6 °C), temperature source Tympanic, height 5' 8" (1.727 m), weight 92.1 kg (203 lb), SpO2 99 %. Body mass index is 30.87 kg/m². General Appearance:   Alert, cooperative, no distress   HEENT:   Normocephalic, atraumatic, anicteric     Neck:   Supple, symmetrical, trachea midline   Lungs:   Equal chest rise, respirations unlabored    Heart:   Regular rate and rhythm   Abdomen:   Soft, non-tender, non-distended; normal bowel sounds; no masses, no organomegaly    Rectal:   Deferred    Extremities:   No cyanosis, clubbing or edema    Neuro: Moves all 4 extremities    Skin:   No jaundice, rashes, or lesions      LABORATORY RESULTS     No visits with results within 1 Day(s) from this visit.    Latest known visit with results is:   Hospital Outpatient Visit on 08/11/2023   Component Date Value   • Case Report 08/11/2023                      Value:Surgical Pathology Report                         Case: [id number] Authorizing Provider:  Gilles Sanchez DO              Collected:           08/11/2023 5958              Ordering Location:     [facility] Received:            08/11/2023 1054                                     Operating Room                                                               Pathologist:           Josh Waite MD                                                             Specimens:   A) - Duodenum, bx to R/O celiac disease                                                             B) - Stomach, bx to R/O h-pylori                                                                    C) - Esophagus, cold fcp bx r/o EOE-distal                                                          D) - Esophagus, cold fcp bx r/o EOE-proximal                                                        E) - Large Intestine, Cecum, cold fcp bx of polyp x1                                      • Final Diagnosis 08/11/2023                      Value: This result contains rich text formatting which cannot be displayed here. • Additional Information 08/11/2023                      Value: This result contains rich text formatting which cannot be displayed here. • Synoptic Checklist 08/11/2023                      Value:                            COLON/RECTUM POLYP FORM - GI - All Specimens                                                                                     :    Other                                                         :    No hyperplastic or adenomatous polyp seen     • Gross Description 08/11/2023                      Value: This result contains rich text formatting which cannot be displayed here. IMAGING RESULTS     No results found. I have personally reviewed pertinent imaging study reports. Gilles Sanchez D.O. [address] [facility]  Division of Gastroenterology & Hepatology  Available on [email or web address]    ** Please Note: This note is constructed using a voice recognition dictation system.  **

## 2023-10-03 ENCOUNTER — OFFICE VISIT (OUTPATIENT)
Dept: FAMILY MEDICINE CLINIC | Facility: CLINIC | Age: 53
End: 2023-10-03
Payer: COMMERCIAL

## 2023-10-03 VITALS
WEIGHT: 205 LBS | OXYGEN SATURATION: 99 % | TEMPERATURE: 97.6 F | DIASTOLIC BLOOD PRESSURE: 92 MMHG | BODY MASS INDEX: 31.07 KG/M2 | HEART RATE: 92 BPM | HEIGHT: 68 IN | SYSTOLIC BLOOD PRESSURE: 128 MMHG

## 2023-10-03 DIAGNOSIS — L03.113 CELLULITIS OF RIGHT UPPER EXTREMITY: ICD-10-CM

## 2023-10-03 DIAGNOSIS — T63.483A INSECT STINGS, ASSAULT, INITIAL ENCOUNTER: Primary | ICD-10-CM

## 2023-10-03 DIAGNOSIS — K21.9 GASTROESOPHAGEAL REFLUX DISEASE WITHOUT ESOPHAGITIS: ICD-10-CM

## 2023-10-03 PROCEDURE — 99214 OFFICE O/P EST MOD 30 MIN: CPT | Performed by: FAMILY MEDICINE

## 2023-10-03 RX ORDER — MOMETASONE FUROATE 1 MG/G
CREAM TOPICAL DAILY
Qty: 15 G | Refills: 0 | Status: SHIPPED | OUTPATIENT
Start: 2023-10-03

## 2023-10-03 RX ORDER — PREDNISONE 20 MG/1
20 TABLET ORAL 2 TIMES DAILY WITH MEALS
Qty: 6 TABLET | Refills: 0 | Status: SHIPPED | OUTPATIENT
Start: 2023-10-03

## 2023-10-03 RX ORDER — PANTOPRAZOLE SODIUM 40 MG/1
40 TABLET, DELAYED RELEASE ORAL DAILY
Qty: 90 TABLET | Refills: 2 | Status: SHIPPED | OUTPATIENT
Start: 2023-10-03

## 2023-10-03 RX ORDER — CEPHALEXIN 500 MG/1
500 CAPSULE ORAL EVERY 6 HOURS SCHEDULED
Qty: 40 CAPSULE | Refills: 0 | Status: SHIPPED | OUTPATIENT
Start: 2023-10-03 | End: 2023-10-13

## 2023-10-03 NOTE — PROGRESS NOTES
Assessment/Plan: Prednisone 20 mg twice daily 3 days Zyrtec is already being taken tetanus is up-to-date cephalexin 500 mg 4 times daily mometasone cream twice daily    Problem List Items Addressed This Visit    None  Visit Diagnoses     Insect stings, assault, initial encounter    -  Primary           Diagnoses and all orders for this visit:    Insect stings, assault, initial encounter        No problem-specific Assessment & Plan notes found for this encounter. Subjective:      Patient ID: Delphine Salguero is a 45 y.o. female. Mrs. Ananth Up is here with area of induration and redness and warmth on the forearm right side it extends from the wrist about two thirds of the way up to the elbow and goes the full width of the forearm she was stung [place] which she thinks may have been a wasp or perhaps a yellowjacket she is allergic to wasp stings she does not get anaphylaxis but she does swell excessively she is up-to-date on her tetanus shots and will be treated for cellulitis and allergic reaction to bee sting to insect sting      The following portions of the patient's history were reviewed and updated as appropriate:   She has a past medical history of Hyperlipidemia, Hypertension, and Menopause ovarian failure. ,  does not have any pertinent problems on file. ,   has a past surgical history that includes Cholecystectomy (1995); Tooth extraction; Hysterectomy; Breast biopsy (Left, 2009); Epidural block injection (Right, 12/02/2021); Epidural block injection (N/A, 12/28/2021); NERVE BLOCK (Bilateral, 02/15/2022); NERVE BLOCK (Bilateral, 03/03/2022); FL guided needle plac bx/asp/inj (03/03/2022); Rhizotomy (Bilateral, 04/14/2022); FL guided needle plac bx/asp/inj (04/14/2022); Breast biopsy (Right, 05/18/2022); US guided breast biopsy right complete (Right, 5/18/2022); and Rhizotomy (Bilateral, 11/15/2022). ,  family history includes Alzheimer's disease in her mother; Asthma in her daughter; Breast cancer in her sister; Breast cancer (age of onset: 39) in her cousin; Cancer in her father; Emphysema in her maternal aunt; Heart attack in her father; Heart disease in her father; Lung cancer in her maternal grandfather, mother, and paternal grandfather; No Known Problems in her paternal aunt, paternal aunt, paternal aunt, paternal grandmother, son, and son; Prostate cancer in her father; Thyroid disease in her daughter and sister; Tongue cancer in her maternal grandmother. ,   reports that she has never smoked. She has never used smokeless tobacco. She reports current alcohol use of about 2.0 standard drinks of alcohol per week. She reports that she does not use drugs. ,  is allergic to codeine. .  Current Outpatient Medications   Medication Sig Dispense Refill   • Calcium-Vitamin D-Vitamin K (VIACTIV PO) Take by mouth daily     • cetirizine (ZyrTEC) 10 MG chewable tablet Chew 10 mg daily     • Cholecalciferol 50 MCG (2000 UT) CAPS Take by mouth daily     • desvenlafaxine succinate (PRISTIQ) 50 mg 24 hr tablet TAKE 1 TABLET DAILY 90 tablet 3   • Linda 0.05 MG/24HR apply 1 patch two times a week as directed 8 patch 0   • gabapentin (NEURONTIN) 300 mg capsule Take 1 capsule (300 mg total) by mouth 3 (three) times a day 270 capsule 0   • Glucosamine-Chondroitin--200-150 MG TABS      • ibuprofen (MOTRIN) 600 mg tablet TAKE 1 TABLET EVERY 12 HOURS AS NEEDED FOR MODERATE PAIN 180 tablet 3   • metoprolol succinate (TOPROL-XL) 50 mg 24 hr tablet TAKE 1 TABLET DAILY 90 tablet 3   • Multiple Vitamin (MULTIVITAMIN) tablet Take 1 tablet by mouth daily     • pantoprazole (PROTONIX) 40 mg tablet Take 1 tablet (40 mg total) by mouth daily 90 tablet 2   • rosuvastatin (CRESTOR) 10 MG tablet TAKE 1 TABLET DAILY 90 tablet 3   • spironolactone (ALDACTONE) 25 mg tablet TAKE 1 TABLET DAILY 90 tablet 3     No current facility-administered medications for this visit.        Review of Systems   Constitutional: Negative for activity change, appetite change, diaphoresis, fatigue and fever. HENT: Negative. Eyes: Negative. Respiratory: Negative for apnea, cough, chest tightness, shortness of breath and wheezing. Cardiovascular: Negative for chest pain, palpitations and leg swelling. Gastrointestinal: Negative for abdominal distention, abdominal pain, anal bleeding, constipation, diarrhea, nausea and vomiting. Endocrine: Negative for cold intolerance, heat intolerance, polydipsia, polyphagia and polyuria. Genitourinary: Negative for difficulty urinating, dysuria, flank pain, hematuria and urgency. Musculoskeletal: Negative for arthralgias, back pain, gait problem, joint swelling and myalgias. Skin: Negative for color change, rash and wound. Insect sting right forearm   Allergic/Immunologic: Negative for environmental allergies, food allergies and immunocompromised state. Neurological: Negative for dizziness, seizures, syncope, speech difficulty, numbness and headaches. Hematological: Negative for adenopathy. Does not bruise/bleed easily. Psychiatric/Behavioral: Negative for agitation, behavioral problems, hallucinations, sleep disturbance and suicidal ideas. Objective:  Vitals:    10/03/23 1455   BP: 128/92   BP Location: Left arm   Patient Position: Sitting   Cuff Size: Standard   Pulse: 92   Temp: 97.6 °F (36.4 °C)   TempSrc: Temporal   SpO2: 99%   Weight: 93 kg (205 lb)   Height: 5' 8" (1.727 m)     Body mass index is 31.17 kg/m². Physical Exam  Constitutional:       General: She is not in acute distress. Appearance: She is well-developed. She is not diaphoretic. HENT:      Head: Normocephalic. Right Ear: External ear normal.      Left Ear: External ear normal.      Nose: Nose normal.   Eyes:      General: No scleral icterus. Right eye: No discharge. Left eye: No discharge.       Conjunctiva/sclera: Conjunctivae normal.      Pupils: Pupils are equal, round, and reactive to light. Neck:      Thyroid: No thyromegaly. Trachea: No tracheal deviation. Cardiovascular:      Rate and Rhythm: Normal rate and regular rhythm. Heart sounds: Normal heart sounds. No murmur heard. No friction rub. No gallop. Pulmonary:      Effort: Pulmonary effort is normal. No respiratory distress. Breath sounds: Normal breath sounds. No wheezing. Abdominal:      General: Bowel sounds are normal.      Palpations: Abdomen is soft. There is no mass. Tenderness: There is no abdominal tenderness. There is no guarding. Musculoskeletal:         General: No deformity. Cervical back: Normal range of motion. Lymphadenopathy:      Cervical: No cervical adenopathy. Skin:     General: Skin is warm and dry. Findings: Erythema present. No rash. Neurological:      Mental Status: She is alert and oriented to person, place, and time. Cranial Nerves: No cranial nerve deficit. Psychiatric:         Thought Content:  Thought content normal.

## 2023-10-09 ENCOUNTER — TELEPHONE (OUTPATIENT)
Dept: FAMILY MEDICINE CLINIC | Facility: CLINIC | Age: 53
End: 2023-10-09

## 2023-10-09 DIAGNOSIS — L03.119 CELLULITIS OF UPPER EXTREMITY, UNSPECIFIED LATERALITY: Primary | ICD-10-CM

## 2023-10-09 RX ORDER — AZITHROMYCIN 250 MG/1
TABLET, FILM COATED ORAL
Qty: 6 TABLET | Refills: 0 | Status: SHIPPED | OUTPATIENT
Start: 2023-10-09 | End: 2023-10-13

## 2023-10-09 NOTE — TELEPHONE ENCOUNTER
Pt called stating that she completed her prednisone on Friday morning and she stopped her keflex after yesterday morning dose because she was feeling like she was "speeding up" at first she though it ws the prednisone but when it did not stop when she finished the prednisone  she was thinking it was the keflex so that is why she stopped that medication on Sunday morning .     Her arm turned red and itchy and warm again today, she is thinking she needs an antibiotic but not the keflex      Please advice

## 2023-10-12 ENCOUNTER — HOSPITAL ENCOUNTER (OUTPATIENT)
Dept: RADIOLOGY | Facility: HOSPITAL | Age: 53
Discharge: HOME | End: 2023-10-12
Attending: STUDENT IN AN ORGANIZED HEALTH CARE EDUCATION/TRAINING PROGRAM
Payer: COMMERCIAL

## 2023-10-12 DIAGNOSIS — R13.12 OROPHARYNGEAL DYSPHAGIA: ICD-10-CM

## 2023-10-12 PROCEDURE — 92611 MOTION FLUOROSCOPY/SWALLOW: CPT

## 2023-10-12 PROCEDURE — 74230 X-RAY XM SWLNG FUNCJ C+: CPT

## 2023-10-12 NOTE — PROCEDURES
Speech Pathology Videofluoroscopic Swallow Study (VFSS/VBSS/MBSS)    Patient Name: Janette Jewell  AFHLJ'Z Date: 10/12/2023     Problem List  Active Problems: There are no active Hospital Problems. Past Medical History  Past Medical History:   Diagnosis Date    Hyperlipidemia     Hypertension     Menopause ovarian failure      Past Surgical History  Past Surgical History:   Procedure Laterality Date    BREAST BIOPSY Left 2009    core bx; benign     BREAST BIOPSY Right 05/18/2022    US guided biopsy    CHOLECYSTECTOMY  1995    EPIDURAL BLOCK INJECTION Right 12/02/2021    Procedure: L4 and L5 TRANSFORAMINAL EPIDURAL STEROID INJECTION;  Surgeon: Vinnie Rocha MD;  Location: [facility];  Service: Pain Management     EPIDURAL BLOCK INJECTION N/A 12/28/2021    Procedure: BLOCK / INJECTION EPIDURAL STEROID LUMBAR L4-L5;  Surgeon: Vinnie Rocha MD;  Location: [facility];  Service: Pain Management     FL GUIDED NEEDLE PLAC BX/ASP/INJ  03/03/2022    FL GUIDED NEEDLE PLAC BX/ASP/INJ  04/14/2022    HYSTERECTOMY      age 26    NERVE BLOCK Bilateral 02/15/2022    Procedure: BLOCK MEDIAL BRANCH L3, L4, L5 #1;  Surgeon: Vinnie Rocha MD;  Location: [facility];  Service: Pain Management     NERVE BLOCK Bilateral 03/03/2022    Procedure: BLOCK MEDIAL BRANCH L3, L4, L5 #2;  Surgeon: Vinnie Rocha MD;  Location: [facility];  Service: Pain Management     RHIZOTOMY Bilateral 04/14/2022    Procedure: RHIZOTOMY LUMBAR L3, L4, L5;  Surgeon: Vinnie Rocha MD;  Location: [facility];  Service: Pain Management     RHIZOTOMY Bilateral 11/15/2022    Procedure: RHIZOTOMY LUMBAR L3, L4, L5;  Surgeon: Vinnie Rocha MD;  Location: [facility];  Service: Pain Management     TOOTH EXTRACTION      US GUIDED BREAST BIOPSY RIGHT COMPLETE Right 5/18/2022     General Information;  Pt is a 45 y.o. female with a PMH remarkable for GERD, hiatal hernia, HTN, anxiety, and oropharyngeal dysphagia.     Current concerns for dysphagia include coughing with swallowing. A VFSS was recommended to assess oropharyngeal stage swallowing skills at this time. Pt was viewed sitting upright in the lateral and AP positions. Trials administered were consistent with [facility] Validated Protocol: 5-mL thin liquid x2, 20-mL cup sip thin, 40-mL sequential swallow thin, 5-mL nectar thick, 20-mL cup sip nectar thick, 40-mL sequential swallow nectar thick, 5-mL Honey thick, 5-mL pudding, ½ cookie coated with 3-mL pudding, 20-mL cup sip with chin tuck of thin liquid, 5-mL nectar thick in the AP position, and 5-mL pudding in the AP position. Pt was also given thin liquids by straw, as well as a barium tablet with thin liquid. Oral stage:  Pt presented with minimal oral stage dysphagia. Mastication was timely and grossly effective with materials administered today. Bolus formation and transfer were functional.  Oral control appeared slightly reduced with minimal premature spillage over the base of tongue. Pharyngeal stage:  Pt presented with mild pharyngeal dysphagia. Velar elevation noted. Swallowing initiation was prompt. Laryngeal rise and anterior hyoid excursion appeared adequate. Airway entrance closure appeared reduced, as noted by laryngeal penetration at times. Curled epiglottic tip may be responsible for limiting complete epiglottic inversion. Tongue base retraction appeared to be min-mildly reduced in strength, noting min-mild vallecular retention after swallow. Use of chin tuck and secondary swallow were effective in pharyngeal clearance. Pharyngeal constriction suspected or appeared adequate. PES opening appeared minimally dysfunctional as noted by retropulsion through UES. Management of food/liquid/barium tablet follows: Laryngeal penetration occurred with sequential sips of thin and NTL via cup, as well as tsp sip of NTL.     Strategies and Efficacy: small sip, chin tuck, and secondary swallow cleared pharyngeal/vallecular retention    Aspiration Response and Efficacy:  No aspiration events occurred in this study    Esophageal stage:  Esophageal screening was completed. Cricopharyngeal prominence vs osteophyte observed at C5-C6, which did not impede bolus flow but may contribute to proximal esophageal narrowing. Mild retropulsion that exceeded UES was seen with thickened liquids. No holdup observed with barium tablet. Assessment Summary:    Pt presents with minimal-mild oropharyngeal dysphagia characterized by slightly reduced control of bolus, reduced airway closure as a result of curled epiglottic tip leading to laryngeal penetration and vallecular retention. Min-mild PS retention noted after swallow as a result of retropulsion through UES. Secondary swallow with chin tuck improved vallecular clearance. Note: Images are available for review in PACS as desired. NOMS (National Outcome Measurement System): Swallowing "Score"     LEVEL 6: Swallowing is safe, and the individual eats and drinks independently and may rarely require minimal cueing. The individual usually self-cues when difficulty occurs. May need to avoid specific food items (e.g., popcorn and nuts) or require additional time (due to dysphagia). Recommendations:   Recommended Diet:  regular diet and thin liquids   Recommended Form of Medications: whole with liquid   Aspiration precautions and compensatory swallowing strategies: small bites/sips, chin tuck, and secondary swallow  Consider referral to:  GI 2/2 osteophyte vs cricopharyngeal prominence vs narrowing at level of C5-6  SLP Dysphagia therapy recommended: not at this time    Results Reviewed with: patient   Pt/Family Education: VBS findings and recommendations immediately reviewed w/ pt w/ use of images to aid in understanding. Education provided on strategies to optimize swallow safety, including the importance of oral care and s/s aspiration to monitor for and notify medical team of should they arise.  Pt verbalized understanding and denied questions at this time.      Patient Stated Goal: "I just want to figure out why I'm choking."    Dysphagia Goals per SLP: none at this time

## 2023-10-13 NOTE — TELEPHONE ENCOUNTER
Left detailed message asking if pt received last message, got her medication and if she took it   If she has any questions she should call the office

## 2023-11-06 ENCOUNTER — HOSPITAL ENCOUNTER (OUTPATIENT)
Dept: MRI IMAGING | Facility: HOSPITAL | Age: 53
Discharge: HOME | End: 2023-11-06
Payer: COMMERCIAL

## 2023-11-06 VITALS — HEIGHT: 68 IN | WEIGHT: 203 LBS | BODY MASS INDEX: 30.77 KG/M2

## 2023-11-06 DIAGNOSIS — R92.30 DENSE BREAST TISSUE ON MAMMOGRAM: ICD-10-CM

## 2023-11-06 PROCEDURE — 77049 MRI BREAST C-+ W/CAD BI: CPT

## 2023-11-06 PROCEDURE — C8908 MRI W/O FOL W/CONT, BREAST,: HCPCS

## 2023-11-06 PROCEDURE — A9585 GADOBUTROL INJECTION: HCPCS | Performed by: FAMILY MEDICINE

## 2023-11-06 PROCEDURE — G1004 CDSM NDSC: HCPCS

## 2023-11-06 RX ORDER — GADOBUTROL 604.72 MG/ML
9 INJECTION INTRAVENOUS
Status: COMPLETED | OUTPATIENT
Start: 2023-11-06 | End: 2023-11-06

## 2023-11-06 RX ADMIN — GADOBUTROL 9 ML: 604.72 INJECTION INTRAVENOUS at 14:53

## 2023-11-08 ENCOUNTER — TELEPHONE (OUTPATIENT)
Dept: FAMILY MEDICINE CLINIC | Facility: CLINIC | Age: 53
End: 2023-11-08

## 2023-11-08 DIAGNOSIS — R92.8 ABNORMAL MAMMOGRAM: Primary | ICD-10-CM

## 2023-11-08 NOTE — TELEPHONE ENCOUNTER
----- Message from Timbo Prado DO sent at 11/8/2023 12:47 PM EST -----  Please provide phone number and facilitate consult with Dr. Rosibel Francois regarding abnormal mammogram  ----- Message -----  From: Steph Cesar MD  Sent: 11/8/2023   8:54 AM EST  To: Timbo Prado DO

## 2023-11-08 NOTE — TELEPHONE ENCOUNTER
L/M requesting return call to discuss 's message    Provider Dr shiela Castro - Address [address], [address] [place] [address] - [phone]

## 2023-11-09 ENCOUNTER — DOCUMENTATION (OUTPATIENT)
Dept: HEMATOLOGY ONCOLOGY | Facility: CLINIC | Age: 53
End: 2023-11-09

## 2023-11-09 NOTE — PROGRESS NOTES
Intake received- chart reviewed for external records retrieval.    Patient is scheduled on 11- with Maribel for Dx: abnormal mammogram.    Due to Dx on patient's referral, no records retrieval needed by Oncology Care Coordination.

## 2023-11-14 ENCOUNTER — CONSULT (OUTPATIENT)
Dept: SURGICAL ONCOLOGY | Facility: CLINIC | Age: 53
End: 2023-11-14
Payer: COMMERCIAL

## 2023-11-14 ENCOUNTER — HOSPITAL ENCOUNTER (OUTPATIENT)
Dept: ULTRASOUND IMAGING | Facility: CLINIC | Age: 53
Discharge: HOME | End: 2023-11-14
Payer: COMMERCIAL

## 2023-11-14 VITALS
HEART RATE: 74 BPM | BODY MASS INDEX: 30.92 KG/M2 | OXYGEN SATURATION: 99 % | TEMPERATURE: 97 F | RESPIRATION RATE: 18 BRPM | WEIGHT: 204 LBS | SYSTOLIC BLOOD PRESSURE: 148 MMHG | HEIGHT: 68 IN | DIASTOLIC BLOOD PRESSURE: 94 MMHG

## 2023-11-14 VITALS — WEIGHT: 200 LBS | BODY MASS INDEX: 30.31 KG/M2 | HEIGHT: 68 IN

## 2023-11-14 DIAGNOSIS — N63.14 MASS OF LOWER INNER QUADRANT OF RIGHT BREAST: Primary | ICD-10-CM

## 2023-11-14 DIAGNOSIS — Z80.3 FAMILY HISTORY OF BREAST CANCER IN SISTER: ICD-10-CM

## 2023-11-14 DIAGNOSIS — R92.343 EXTREMELY DENSE TISSUE OF BOTH BREASTS ON MAMMOGRAPHY: ICD-10-CM

## 2023-11-14 DIAGNOSIS — Z91.89 AT HIGH RISK FOR BREAST CANCER: ICD-10-CM

## 2023-11-14 DIAGNOSIS — R92.8 ABNORMAL MRI, BREAST: ICD-10-CM

## 2023-11-14 PROCEDURE — 76642 ULTRASOUND BREAST LIMITED: CPT

## 2023-11-14 PROCEDURE — 99204 OFFICE O/P NEW MOD 45 MIN: CPT

## 2023-11-14 NOTE — RESULT ENCOUNTER NOTE
Please call the patient regarding her abnormal result.   Make sure patient follows with the nurse navigator setting up the MRI guided needle biopsy of the breast tissue

## 2023-11-14 NOTE — PROGRESS NOTES
Surgical Oncology Consult       [facility]  [address] [address] [phone]    Ruthie Mccoy  1970  [id number]  [facility] [address] [address] [phone]    Diagnoses and all orders for this visit:    Mass of lower inner quadrant of right breast  -     MRI breast biopsy right (all inclusive); Future  -     BUN; Future  -     Creatinine, serum; Future    At high risk for breast cancer    Family history of breast cancer in sister    Extremely dense tissue of both breasts on mammography        Chief Complaint   Patient presents with    Advice Only       Return for To be determined after biopsy. History of Present Illness: The patient presents to the office today in consultation for a right breast mass. She is at high risk for breast cancer and has extremely dense breasts, and had her first screening breast MRI performed on November 6. This identified an irregular 9mm mass in the lower inner quadrant of the right breast for which biopsy has been recommended. No abnormality was seen on her screening mammogram 6 months ago. I was able to arrange for her to have a 2nd look US performed earlier today. She comes in now for discussion. The patient denies any change on SBE, and has no systemic complaints. She has had several breast biopsies performed in the past, with benign results. She reports that her sister was diagnosed with breast cancer at 61, and a maternal first cousin was diagnosed with breast cancer in her mid-43s, both having unlilateral HR-positive disease. The patient is currently on an estradiol patch as prescribed by her gynecologist for the treatment of hot flashes, although her PCP has advised against the use of any HRT.  I have calculated her lifetime breast cancer risk to be 22-40%, depending on the calculator used. Review of Systems   Constitutional:  Negative for activity change, appetite change, fatigue and unexpected weight change. HENT: Negative. Respiratory: Negative. Negative for cough and shortness of breath. Cardiovascular: Negative. Gastrointestinal: Negative. Negative for abdominal pain, nausea and vomiting. Musculoskeletal: Negative. Skin: Negative. Negative for color change. Neurological: Negative. Negative for dizziness and headaches. Hematological: Negative. Negative for adenopathy. Psychiatric/Behavioral: Negative.                  Patient Active Problem List   Diagnosis    Mixed hyperlipidemia    Hypertension    Anxiety    Vitamin D deficiency    Lumbar facet arthropathy    Lumbar radiculopathy    Gastroesophageal reflux disease    Oropharyngeal dysphagia    Hiatal hernia     Past Medical History:   Diagnosis Date    Hyperlipidemia     Hypertension     Menopause ovarian failure      Past Surgical History:   Procedure Laterality Date    BREAST BIOPSY Left 2009    core bx; benign     BREAST BIOPSY Right 05/18/2022    US guided biopsy    CHOLECYSTECTOMY  1995    EPIDURAL BLOCK INJECTION Right 12/02/2021    Procedure: L4 and L5 TRANSFORAMINAL EPIDURAL STEROID INJECTION;  Surgeon: Eloisa Santoyo MD;  Location: [facility];  Service: Pain Management     EPIDURAL BLOCK INJECTION N/A 12/28/2021    Procedure: BLOCK / INJECTION EPIDURAL STEROID LUMBAR L4-L5;  Surgeon: Eloisa Santoyo MD;  Location: [facility];  Service: Pain Management     FL GUIDED NEEDLE PLAC BX/ASP/INJ  03/03/2022    FL GUIDED NEEDLE PLAC BX/ASP/INJ  04/14/2022    HYSTERECTOMY      age 26    NERVE BLOCK Bilateral 02/15/2022    Procedure: BLOCK MEDIAL BRANCH L3, L4, L5 #1;  Surgeon: Eloisa Santoyo MD;  Location: [facility];  Service: Pain Management     NERVE BLOCK Bilateral 03/03/2022    Procedure: BLOCK MEDIAL BRANCH L3, L4, L5 #2;  Surgeon: Eloisa Santoyo MD;  Location: [facility];  Service: Pain Management     RHIZOTOMY Bilateral 04/14/2022    Procedure: RHIZOTOMY LUMBAR L3, L4, L5;  Surgeon: Marito Haque MD;  Location: [facility];  Service: Pain Management     RHIZOTOMY Bilateral 11/15/2022    Procedure: RHIZOTOMY LUMBAR L3, L4, L5;  Surgeon: Marito Haque MD;  Location: [facility];  Service: Pain Management     TOOTH EXTRACTION      US GUIDED BREAST BIOPSY RIGHT COMPLETE Right 05/18/2022     Family History   Problem Relation Age of Onset    Alzheimer's disease Mother     Lung cancer Mother     Heart disease Father         cardiac disorder    Prostate cancer Father     Cancer Father     Heart attack Father     Breast cancer Sister     Thyroid disease Sister     Asthma Daughter     Tongue cancer Maternal Grandmother     Lung cancer Maternal Grandfather     No Known Problems Paternal Grandmother     Lung cancer Paternal Grandfather     Emphysema Maternal Aunt     Breast cancer Cousin 39    No Known Problems Son     No Known Problems Son     No Known Problems Paternal Aunt     No Known Problems Paternal Aunt     No Known Problems Paternal Aunt     Thyroid disease Daughter      Social History     Socioeconomic History    Marital status: /Civil Union     Spouse name: Not on file    Number of children: Not on file    Years of education: Not on file    Highest education level: Not on file   Occupational History    Not on file   Tobacco Use    Smoking status: Never    Smokeless tobacco: Never   Vaping Use    Vaping Use: Never used   Substance and Sexual Activity    Alcohol use:  Yes     Alcohol/week: 2.0 standard drinks of alcohol     Types: 2 Glasses of wine per week     Comment: social drinker    Drug use: No    Sexual activity: Yes     Partners: Male     Birth control/protection: None     Comment: hysterectomy   Other Topics Concern    Not on file   Social History Narrative    Not on file     Social Determinants of Health     Financial Resource Strain: Not on file   Food Insecurity: Not on file Transportation Needs: Not on file   Physical Activity: Not on file   Stress: Not on file   Social Connections: Not on file   Intimate Partner Violence: Not on file   Housing Stability: Not on file       Current Outpatient Medications:     Calcium-Vitamin D-Vitamin K (VIACTIV PO), Take by mouth daily, Disp: , Rfl:     cetirizine (ZyrTEC) 10 MG chewable tablet, Chew 10 mg daily, Disp: , Rfl:     Cholecalciferol 50 MCG (2000 UT) CAPS, Take by mouth daily, Disp: , Rfl:     desvenlafaxine succinate (PRISTIQ) 50 mg 24 hr tablet, TAKE 1 TABLET DAILY, Disp: 90 tablet, Rfl: 3    Linda 0.05 MG/24HR, apply 1 patch two times a week as directed, Disp: 8 patch, Rfl: 0    ibuprofen (MOTRIN) 600 mg tablet, TAKE 1 TABLET EVERY 12 HOURS AS NEEDED FOR MODERATE PAIN, Disp: 180 tablet, Rfl: 3    metoprolol succinate (TOPROL-XL) 50 mg 24 hr tablet, TAKE 1 TABLET DAILY, Disp: 90 tablet, Rfl: 3    Multiple Vitamin (MULTIVITAMIN) tablet, Take 1 tablet by mouth daily, Disp: , Rfl:     pantoprazole (PROTONIX) 40 mg tablet, Take 1 tablet (40 mg total) by mouth daily, Disp: 90 tablet, Rfl: 2    rosuvastatin (CRESTOR) 10 MG tablet, TAKE 1 TABLET DAILY, Disp: 90 tablet, Rfl: 3    spironolactone (ALDACTONE) 25 mg tablet, TAKE 1 TABLET DAILY, Disp: 90 tablet, Rfl: 3  Allergies   Allergen Reactions    Codeine Other (See Comments)     "throat swells"     Vitals:    11/14/23 1205   BP: 148/94   Pulse: 74   Resp: 18   Temp: (!) 97 °F (36.1 °C)   SpO2: 99%       Physical Exam  Vitals reviewed. Constitutional:       General: She is not in acute distress. Appearance: Normal appearance. She is not ill-appearing or toxic-appearing. HENT:      Head: Normocephalic and atraumatic. Nose: Nose normal.      Mouth/Throat:      Mouth: Mucous membranes are moist.   Eyes:      General: No scleral icterus. Conjunctiva/sclera: Conjunctivae normal.   Cardiovascular:      Rate and Rhythm: Normal rate.    Pulmonary:      Effort: Pulmonary effort is normal.   Chest:   Breasts:     Right: Normal. No inverted nipple, mass, nipple discharge, skin change or tenderness. Left: Normal. No inverted nipple, mass, nipple discharge, skin change or tenderness. Comments: Breasts are smooth and symmetric bilaterally. There are no dominant masses, nodules, skin changes or tenderness. There is a firm mass in the left axilla (stable per patient). I do not appreciate any adenopathy. Musculoskeletal:         General: Normal range of motion. Cervical back: Normal range of motion and neck supple. Lymphadenopathy:      Cervical: No cervical adenopathy. Upper Body:      Right upper body: No supraclavicular, axillary or pectoral adenopathy. Left upper body: No supraclavicular, axillary or pectoral adenopathy. Skin:     General: Skin is warm and dry. Neurological:      General: No focal deficit present. Mental Status: She is alert and oriented to person, place, and time. Psychiatric:         Mood and Affect: Mood normal.         Behavior: Behavior normal.         Thought Content: Thought content normal.         Judgment: Judgment normal.            Imaging  US breast right limited (diagnostic)    Result Date: 11/14/2023  Narrative: DIAGNOSIS: Abnormal MRI, breast TECHNIQUE: Ultrasound of the right breast(s) was performed. COMPARISONS: Prior breast imaging dated: 11/06/2023, 05/03/2023, 05/18/2022, 05/18/2022, 05/05/2022, and 05/02/2022 RELEVANT HISTORY: Family Breast Cancer History: History of breast cancer in Sister, [place]. Family Medical History: Family medical history includes breast cancer in 2 relatives (cousin, sister). Personal History: No known relevant hormone history. Surgical history includes breast biopsy and hysterectomy. No known relevant medical history.  RISK ASSESSMENT: 5 Year Tyrer-Cuzick: 3.57 % 10 Year Tyrer-Cuzick: 7.76 % Lifetime Tyrer-Cuzick: 24.02 % INDICATION: Marco Yuan is a 52 y.o. female presenting for abnormal MRI, breast. FINDINGS: Targeted right breast ultrasound: Targeted ultrasound was performed the technologist to evaluate the lower central breast from 4:00 to 6:00. There are some prominent ducts. No sonographic abnormality was seen. Specifically, there is no ultrasound correlate for the area of abnormal enhancement seen on the recent MRI. Impression:  No ultrasound correlate for the area of abnormal enhancement on MRI. MRI guided core needle biopsy is therefore recommended. I personally discussed the imaging findings and recommendation for MRI guided core needle biopsy with the patient. She will be contacted by our department to schedule the procedure. She has a follow-up appointment with surgery today. This patient has been identified as being at elevated risk for development of breast cancer based on the Tyrer-Cuzick model. She may benefit from supplemental screening. ASSESSMENT/BI-RADS CATEGORY: Right: 4 - Suspicious Overall: 4 - Suspicious RECOMMENDATION:      - MRI-guided breast biopsy for  the right breast. Workstation ID: [id number]    MRI breast bilateral w and wo contrast w cad    Addendum Date: 11/8/2023 Addendum:   ADDENDUM: Please see key images in series 20,000 and series 40,[id number],004. See series [id number]. END ADDENDUM    Result Date: 11/8/2023  Narrative: DIAGNOSIS: Dense breast tissue on mammogram TECHNIQUE: MRI of both breasts was performed in axial planes utilizing a combination of localizer, axial T2 STIR, Axial T1 FSPGR in phase, and axial dynamic 3D fat suppressed gradient-echo T1 sequences (VIBRANT) before and after contrast administration at multiple time points. Subtraction images were generated. This exam was read in conjunction with [employer] software. MEDICATIONS ADMINISTERED: Gadobutrol injection (SINGLE-DOSE) SOLN 9 mL, Total Given: 9 mL (1 dose) Intravenous contrast was administered at 2cc/sec, without documented contrast reaction.  COMPARISONS: Prior breast imaging dated: 05/03/2023, 05/18/2022, 05/18/2022, 05/05/2022, 05/02/2022, 04/27/2021, 02/27/2020, 02/27/2020, 02/18/2020, 12/30/2015, 12/30/2015, 12/21/2015, 12/21/2015, 12/21/2015, 11/20/2009, 11/20/2009, 11/18/2009, 11/18/2009, 02/23/2006, and 02/23/2006 RELEVANT HISTORY: Family Breast Cancer History: History of breast cancer in Sister, Cousin. Family Medical History: Family medical history includes breast cancer in 2 relatives (cousin, sister). Personal History: No known relevant hormone history. Surgical history includes breast biopsy and hysterectomy. No known relevant medical history. RISK ASSESSMENT: 5 Year Tyrer-Cuzick: 3.6 % 10 Year Tyrer-Cuzick: 7.83 % Lifetime Tyrer-Cuzick: 24.23 %  TISSUE DENSITY: FGT: The breasts have heterogeneous fibroglandular tissue. BPE: The background parenchymal enhancement is marked and symmetric. INDICATION: Collette Sloop is a 52 y.o. female presenting for dense breast tissue dense breast tissue. FINDINGS: RIGHT 1) MASS [E]: There is a 9 mm oval mass with irregular margins seen in the lower inner quadrant of the right breast at 5 o'clock in the middle depth, which is hyperintense on T2 weighted images. There are dilated ducts or lobulated cysts in the upper outer quadrant of the right breast, anterior to middle depth as well as in the lower central breast, anterior depth. There is no suspicious associated enhancement. This is most consistent with a benign finding. There is no suspicious enhancement in the region of the right breast biopsy at 11:00. There is no axillary or internal mammary adenopathy. Left There are no suspicious enhancing masses or suspicious areas of non-mass enhancement. There is no axillary or internal mammary adenopathy. Bilateral: There is moderate background parenchymal enhancement which can obscure detection of malignancy. There are subcentimeter cysts in both breast.  These do not demonstrate any suspicious enhancement. Impression: 1.   There is a 9 mm mass in the right breast at 5-6 o'clock, middle depth. Image guided biopsy is indicated. Targeted second look ultrasound should be performed to determine if the biopsy can be performed under ultrasound guidance or MRI guidance. 2.  No abnormal enhancement in the right breast in the region of the benign biopsy at 11:00. No MRI evidence of invasive malignancy in the left breast. 3.  Given the breast density and the increased estimated lifetime risk of malignancy, the patient would likely benefit from continued high rescreening with annual mammography and annual MRI. ASSESSMENT/BI-RADS CATEGORY: Left: 2 - Benign Right: 4 - Suspicious Overall: 4 - Suspicious RECOMMENDATION:      - Ultrasound at the current time for the right breast.      - Ultrasound-guided breast biopsy for the right breast.      - Continue annual screening mammography for the left breast.      - Breast MRI Screening in 1 year for both breasts. Workstation ID: [id number]    I personally reviewed and interpreted the above laboratory and imaging data. Discussion/Summary: This is a 53 y/o female who presents today for a mass in the right breast discovered on high-risk breast MRI. Since there was no US correlate, I will arrange for her to have an MRI-guided biopsy. I have explained possible outcomes, both benign and malignant. Further follow-up will be determined once we have biopsy results. She has requested to be seen by Dr Onur Shah if surgical intervention is required. Should the mass be benign, I have recommended enhanced screening to include breast MRI and clinical breast exams every 6 months. I have also recommended she immediately discontinue use of estradiol given her high risk. I will call the patient once I have results of her biopsy to discuss plan of care. She is agreeable to the plan, all questions have been answered.

## 2023-11-20 ENCOUNTER — TELEPHONE (OUTPATIENT)
Dept: GYNECOLOGY | Facility: CLINIC | Age: 53
End: 2023-11-20

## 2023-11-20 DIAGNOSIS — B37.9 YEAST INFECTION: Primary | ICD-10-CM

## 2023-11-20 RX ORDER — FLUCONAZOLE 150 MG/1
150 TABLET ORAL ONCE
Qty: 2 TABLET | Refills: 0 | Status: SHIPPED | OUTPATIENT
Start: 2023-11-20 | End: 2023-11-20

## 2023-11-20 NOTE — TELEPHONE ENCOUNTER
----- Message from Anthony De Jesus sent at 11/20/2023  9:28 AM EST -----  Regarding: FW: Edmund Yolande: [phone]    ----- Message -----  From: Brit Benavides"  Sent: 11/19/2023   4:40 PM EST  To: #  Subject: Estradol                                         After thinking about that medicine, I don't think I will go that route. The side effects bother me and I would rather explore a more natural remedy. Do you know of any that you would recommend? Also, I just noticed that I do have a milky white discharge. Not cottage cheesy though. No real odor. I am using the Lotrimen and it seems to be helping the itch. Is this enough since it's only used externally and I now have a discharge?

## 2023-12-27 NOTE — PRE-PROCEDURE INSTRUCTIONS
_right____ breast MRI guided biopsy scheduled for __12/29/2023____ @ _0930_____ ( __0900____ arrival)         Questions and concerns were addressed at this time.  Patient verbalized understanding.  Allergies & medications reviewed with pt & verified in Epic.  pre-procedure instructions reviewed with pt.    Implants:  breast/surgical/medical  _x__ NO  ___ YES (type: _____ )       Blood thinners:   _x__ NO  ___ YES/medication: ___ (no need to stop per RBC protocol)          -INR ____ (if pt on Coumadin/Warfarin)

## 2023-12-29 ENCOUNTER — TELEPHONE (OUTPATIENT)
Dept: MAMMOGRAPHY | Facility: CLINIC | Age: 53
End: 2023-12-29

## 2023-12-29 ENCOUNTER — HOSPITAL ENCOUNTER (OUTPATIENT)
Dept: RADIOLOGY | Facility: HOSPITAL | Age: 53
Discharge: HOME | End: 2023-12-29
Payer: COMMERCIAL

## 2023-12-29 DIAGNOSIS — N63.14 MASS OF LOWER INNER QUADRANT OF RIGHT BREAST: ICD-10-CM

## 2023-12-29 PROCEDURE — 88342 IMHCHEM/IMCYTCHM 1ST ANTB: CPT | Performed by: PATHOLOGY

## 2023-12-29 PROCEDURE — 88305 TISSUE EXAM BY PATHOLOGIST: CPT | Performed by: PATHOLOGY

## 2023-12-29 PROCEDURE — A9585 GADOBUTROL INJECTION: HCPCS | Performed by: FAMILY MEDICINE

## 2023-12-29 PROCEDURE — A4648 IMPLANTABLE TISSUE MARKER: HCPCS

## 2023-12-29 PROCEDURE — 19085 BX BREAST 1ST LESION MR IMAG: CPT

## 2023-12-29 PROCEDURE — 88360 TUMOR IMMUNOHISTOCHEM/MANUAL: CPT | Performed by: PATHOLOGY

## 2023-12-29 PROCEDURE — 88341 IMHCHEM/IMCYTCHM EA ADD ANTB: CPT | Performed by: PATHOLOGY

## 2023-12-29 RX ORDER — LIDOCAINE HYDROCHLORIDE AND EPINEPHRINE 10; 10 MG/ML; UG/ML
20 INJECTION, SOLUTION INFILTRATION; PERINEURAL ONCE
Status: COMPLETED | OUTPATIENT
Start: 2023-12-29 | End: 2023-12-29

## 2023-12-29 RX ORDER — GADOBUTROL 604.72 MG/ML
10 INJECTION INTRAVENOUS
Status: COMPLETED | OUTPATIENT
Start: 2023-12-29 | End: 2023-12-29

## 2023-12-29 RX ORDER — LIDOCAINE HYDROCHLORIDE 20 MG/ML
5 INJECTION, SOLUTION EPIDURAL; INFILTRATION; INTRACAUDAL; PERINEURAL ONCE
Status: COMPLETED | OUTPATIENT
Start: 2023-12-29 | End: 2023-12-29

## 2023-12-29 RX ADMIN — LIDOCAINE HYDROCHLORIDE,EPINEPHRINE BITARTRATE 20 ML: 10; .01 INJECTION, SOLUTION INFILTRATION; PERINEURAL at 10:09

## 2023-12-29 RX ADMIN — LIDOCAINE HYDROCHLORIDE 5 ML: 20 INJECTION, SOLUTION EPIDURAL; INFILTRATION; INTRACAUDAL; PERINEURAL at 10:09

## 2023-12-29 RX ADMIN — GADOBUTROL 10 ML: 604.72 INJECTION INTRAVENOUS at 10:00

## 2023-12-29 NOTE — NURSING NOTE
Patient right breast biopsy tolerated well, no complaints verbalized. After holding pressure to site, steri-strips applied and band aide. Post procedure discharge instructions and ice packs given to patient with verbal communication of understanding. Patient left MRI suite ambulating with no complaints.   
12-Aug-2022 18:44

## 2023-12-29 NOTE — TELEPHONE ENCOUNTER
Rec'd call from patient stating she had a breast biopsy this morning and that she was bleeding, upon review of her chart pt had MRI biopsy in [place] this morning, pt states she has active bleeding from site, pt states she was putting pressure on site, called me to come to [facility] site to come there to have it checked, adv pt that [facility] only has nursing available on Tuesdays, pt states understanding, pt diverted to [facility] to further evaluation, adv pt that I would call [facility] and notify them of her arrival, pt appreciative for assistance    Call placed to [facility], spoke with Saritha, nurse, states that pt had arrived and did have significant bleeding, they were however not going to create a ER visit, pt bleeding was controlled with the direct pressure and has stopped, pt steri strips were intact, there were going to change dressing and discharge patient    [facility] staff notified and will follow up with patient

## 2024-01-03 PROCEDURE — 88305 TISSUE EXAM BY PATHOLOGIST: CPT | Performed by: PATHOLOGY

## 2024-01-03 PROCEDURE — 88341 IMHCHEM/IMCYTCHM EA ADD ANTB: CPT | Performed by: PATHOLOGY

## 2024-01-03 PROCEDURE — 88360 TUMOR IMMUNOHISTOCHEM/MANUAL: CPT | Performed by: PATHOLOGY

## 2024-01-03 PROCEDURE — 88342 IMHCHEM/IMCYTCHM 1ST ANTB: CPT | Performed by: PATHOLOGY

## 2024-01-04 ENCOUNTER — TELEPHONE (OUTPATIENT)
Dept: SURGICAL ONCOLOGY | Facility: CLINIC | Age: 54
End: 2024-01-04

## 2024-01-04 ENCOUNTER — TELEPHONE (OUTPATIENT)
Dept: HEMATOLOGY ONCOLOGY | Facility: CLINIC | Age: 54
End: 2024-01-04

## 2024-01-04 DIAGNOSIS — R92.343 EXTREMELY DENSE TISSUE OF BOTH BREASTS ON MAMMOGRAPHY: ICD-10-CM

## 2024-01-04 DIAGNOSIS — Z12.39 BREAST CANCER SCREENING OTHER THAN MAMMOGRAM: ICD-10-CM

## 2024-01-04 DIAGNOSIS — Z91.89 AT HIGH RISK FOR BREAST CANCER: ICD-10-CM

## 2024-01-04 DIAGNOSIS — Z80.3 FAMILY HISTORY OF BREAST CANCER IN SISTER: Primary | ICD-10-CM

## 2024-01-04 NOTE — TELEPHONE ENCOUNTER
Called patient and left a voice message to confirm MRI appointment 11/7/24 at 2:30 pm and follow up appointment with Maribel Hill 11/12/24.

## 2024-01-04 NOTE — TELEPHONE ENCOUNTER
Called and reviewed breast biopsy results with the patient.  Explained that there was no cancer seen, and no intervention is needed.  She is already scheduled for mammogram in May, and I will order her MRI for November and plan to see her afterward for another clinical exam.  Will also refer her to Genetics for testing given her family history.  Patient agreeable to the plan.

## 2024-01-04 NOTE — TELEPHONE ENCOUNTER
I called Dinorah to schedule a new patient appointment with the Cancer Risk and Genetics Program.      Outcome:  Genetics appointment scheduled for 6/20/24 10:00 AM    Personal/Family History Related to Appointment:  Fhx breast ca (sister) (1st cousin)   No personal h/o ca   Fhx prostate ca(Father)  Fhx Lung ca (mother- Smoker) (p. Grandfather) (M. Grandfather)  Non Alevism      History of Genetic Testing:  Patient reports no personal or family history of genetic testing    Genetics Family History Questionnaire:  [email or web address]

## 2024-01-18 DIAGNOSIS — E78.2 MIXED HYPERLIPIDEMIA: ICD-10-CM

## 2024-01-18 RX ORDER — ROSUVASTATIN CALCIUM 10 MG/1
TABLET, COATED ORAL
Qty: 90 TABLET | Refills: 3 | Status: SHIPPED | OUTPATIENT
Start: 2024-01-18

## 2024-02-01 ENCOUNTER — OFFICE VISIT (OUTPATIENT)
Dept: GASTROENTEROLOGY | Facility: CLINIC | Age: 54
End: 2024-02-01
Payer: COMMERCIAL

## 2024-02-01 VITALS
BODY MASS INDEX: 31.95 KG/M2 | HEIGHT: 68 IN | RESPIRATION RATE: 18 BRPM | HEART RATE: 66 BPM | TEMPERATURE: 98.4 F | DIASTOLIC BLOOD PRESSURE: 88 MMHG | SYSTOLIC BLOOD PRESSURE: 127 MMHG | OXYGEN SATURATION: 98 % | WEIGHT: 210.8 LBS

## 2024-02-01 DIAGNOSIS — R13.12 OROPHARYNGEAL DYSPHAGIA: ICD-10-CM

## 2024-02-01 DIAGNOSIS — K57.90 DIVERTICULOSIS: ICD-10-CM

## 2024-02-01 DIAGNOSIS — K21.9 GASTROESOPHAGEAL REFLUX DISEASE WITHOUT ESOPHAGITIS: Primary | ICD-10-CM

## 2024-02-01 DIAGNOSIS — K44.9 HIATAL HERNIA: ICD-10-CM

## 2024-02-01 PROCEDURE — 99213 OFFICE O/P EST LOW 20 MIN: CPT | Performed by: STUDENT IN AN ORGANIZED HEALTH CARE EDUCATION/TRAINING PROGRAM

## 2024-02-01 RX ORDER — OMEGA-3S/DHA/EPA/FISH OIL/D3 300MG-1000
CAPSULE ORAL
COMMUNITY
Start: 2023-12-01

## 2024-02-01 RX ORDER — FLUCONAZOLE 150 MG/1
TABLET ORAL
COMMUNITY
Start: 2023-11-20

## 2024-02-01 NOTE — PATIENT INSTRUCTIONS
"Continue the pantoprazole 40 mg once a day  Continue diet as tolerated with \"chin tuck\" technique  Next colonoscopy for colon cancer screening in 10 years  To increase dietary fiber, you may use fiber supplements such as Metamucil or Benefiber or fiber gummies  The daily goal is 25-35 grams of fiber daily  Make sure to drink plenty of water    "

## 2024-02-01 NOTE — PROGRESS NOTES
[facility]  Outpatient Follow-up  Encounter: [id number]    PATIENT INFO     Name: Dinorah Horta  YOB: 1970   Age: 53 y.o.   Sex: female   MRN: [id number]    ASSESSMENT & PLAN     Dinorah Horta is a 53 y.o. female with oropharyngeal dysphagia, GERD, small hiatal hernia.  Fortunately, patient's symptoms of dysphagia appear to have improved significantly with chin tuck technique.  Video barium swallow revealed curled epiglottic tip which may have been responsible for her symptoms.  She reports that as long as she uses the chin tuck when swallowing, she has no issues.  Her reflux appears to be well-controlled at this time as well on pantoprazole once daily.  She will likely need to continue this indefinitely as she does have recurrence of symptoms if she forgets the dose.  No other alarming symptoms at this time.    Continue pantoprazole 40 mg daily  Advised patient to continue using the chin tuck technique when swallowing  Will defer ENT referral at this time as her symptoms have resolved versus are well-controlled  However, if her symptoms recur despite above measures, can consider ENT referral at that time  Encouraged patient to increase dietary fiber given her findings of diverticulosis on colonoscopy  Recommend Metamucil or Benefiber or fiber Gummies with goal fiber intake of 25 to 35 g/day  Encouraged hydration as well  Next colonoscopy in 10 years (due 2033)    1. Gastroesophageal reflux disease without esophagitis    2. Hiatal hernia    3. Oropharyngeal dysphagia    4. Diverticulosis      No orders of the defined types were placed in this encounter.      FOLLOW-UP: 6 months    HISTORY OF PRESENT ILLNESS       Dinorah Horta is a 53 y.o. female who presents to GI office for follow-up of dysphagia, GERD, hiatal hernia.  She has been doing well since last office visit.  She had video barium swallow completed by speech therapy.  She was found to have a curled epiglottic tip  which is believed to be causing her symptoms of oropharyngeal dysphagia.  She was instructed to do chin tuck technique which has helped resolve her symptoms entirely.  She reports that she is able to swallow very well without any recurrence of her dysphagia symptoms as long as she does the chin tuck.  Reflux is well-controlled on pantoprazole.  She takes this once daily.  She does report breakthrough symptoms if she eats certain trigger foods.  She also reports that if she forgets to take the pantoprazole for any reason, she will have flareup of her reflux and heartburn.  Denies any other alarm symptoms at this time.     ENDOSCOPIC HISTORY     UPPER ENDOSCOPY: 8/11/2023 with normal esophagus, normal stomach, normal duodenum (biopsies negative for H. pylori, celiac sprue, and EOE)    COLONOSCOPY: 8/11/2023 with single benign polyp removed, moderate diverticulosis involving the ascending and transverse colon, small internal hemorrhoids, remainder of the colon appeared normal (recommend repeat in 10 years)    REVIEW OF SYSTEMS     CONSTITUTIONAL: Denies any fever, chills, rigors, and weight loss  HEENT: No earache or tinnitus, denies hearing loss or visual disturbances  CARDIOVASCULAR: No chest pain or palpitations  RESPIRATORY: Denies any cough, hemoptysis, shortness of breath or dyspnea on exertion  GASTROINTESTINAL: As noted in the History of Present Illness  GENITOURINARY: No problems with urination, denies any hematuria or dysuria  NEUROLOGIC: No dizziness or vertigo, denies headaches   MUSCULOSKELETAL: Denies any muscle or joint pain   SKIN: Denies skin rashes or itching  ENDOCRINE: Denies excessive thirst, denies intolerance to heat or cold  PSYCHOSOCIAL: Denies depression or anxiety, denies any recent memory loss     Answers submitted by the patient for this visit:  Abdominal Pain Questionnaire (Submitted on 1/31/2024)  Chief Complaint: Abdominal pain  Chronicity: recurrent  Onset: more than 1 month ago  Onset  quality: gradual  Frequency: intermittently  Episode duration: 8 Hours  Progression since onset: waxing and waning  Pain location: epigastric region  Pain - numeric: 4/10  Pain quality: burning  Radiates to: epigastric region  belching: Yes  Aggravated by: NSAIDs  Relieved by: belching  Diagnostic workup: lower endoscopy, upper endoscopy    Historical Information   Past Medical History:   Diagnosis Date    Hyperlipidemia     Hypertension     Menopause ovarian failure      Past Surgical History:   Procedure Laterality Date    BREAST BIOPSY Left 2009    core bx; benign     BREAST BIOPSY Right 05/18/2022    US guided biopsy    CHOLECYSTECTOMY  1995    EPIDURAL BLOCK INJECTION Right 12/02/2021    Procedure: L4 and L5 TRANSFORAMINAL EPIDURAL STEROID INJECTION;  Surgeon: Gustavo Bowen MD;  Location: [facility];  Service: Pain Management     EPIDURAL BLOCK INJECTION N/A 12/28/2021    Procedure: BLOCK / INJECTION EPIDURAL STEROID LUMBAR L4-L5;  Surgeon: Gustavo Bowen MD;  Location: [facility];  Service: Pain Management     FL GUIDED NEEDLE PLAC BX/ASP/INJ  03/03/2022    FL GUIDED NEEDLE PLAC BX/ASP/INJ  04/14/2022    HYSTERECTOMY      age 33    MRI BREAST BIOPSY RIGHT (ALL INCLUSIVE) Right 12/29/2023    NERVE BLOCK Bilateral 02/15/2022    Procedure: BLOCK MEDIAL BRANCH L3, L4, L5 #1;  Surgeon: Gustavo Bowen MD;  Location: [facility];  Service: Pain Management     NERVE BLOCK Bilateral 03/03/2022    Procedure: BLOCK MEDIAL BRANCH L3, L4, L5 #2;  Surgeon: Gustavo Bowen MD;  Location: [facility];  Service: Pain Management     RHIZOTOMY Bilateral 04/14/2022    Procedure: RHIZOTOMY LUMBAR L3, L4, L5;  Surgeon: Gustavo Bowen MD;  Location: [facility];  Service: Pain Management     RHIZOTOMY Bilateral 11/15/2022    Procedure: RHIZOTOMY LUMBAR L3, L4, L5;  Surgeon: uGstavo Bowen MD;  Location: [facility];  Service: Pain Management     TOOTH EXTRACTION      US GUIDED BREAST BIOPSY RIGHT COMPLETE Right 05/18/2022     Social  History   Social History     Substance and Sexual Activity   Alcohol Use Yes    Alcohol/week: 2.0 standard drinks of alcohol    Types: 2 Glasses of wine per week    Comment: social drinker     Social History     Substance and Sexual Activity   Drug Use No     Social History     Tobacco Use   Smoking Status Never   Smokeless Tobacco Never     Family History   Problem Relation Age of Onset    Alzheimer's disease Mother     Lung cancer Mother     Heart disease Father         cardiac disorder    Prostate cancer Father     Heart attack Father     Breast cancer Sister 63    Thyroid disease Sister     Tongue cancer Maternal Grandmother     Lung cancer Maternal Grandfather     No Known Problems Paternal Grandmother     Lung cancer Paternal Grandfather     Asthma Daughter     Thyroid disease Daughter     No Known Problems Son     No Known Problems Son     Emphysema Maternal Aunt     No Known Problems Paternal Aunt     No Known Problems Paternal Aunt     No Known Problems Paternal Aunt     Breast cancer Cousin 45        maternal first cousin       MEDICATIONS & ALLERGIES     Current Outpatient Medications   Medication Instructions    Calcium-Magnesium-Vitamin D (CALCIUM 1200+D3 PO)     cetirizine (ZYRTEC) 10 mg, Oral, Daily    Cholecalciferol 50 MCG (2000 UT) CAPS Oral, Daily    desvenlafaxine succinate (PRISTIQ) 50 mg 24 hr tablet TAKE 1 TABLET DAILY    Linda 0.05 MG/24HR apply 1 patch two times a week as directed    fluconazole (DIFLUCAN) 150 mg tablet take 1 tablet by mouth once daily ON DAYS 1 AND 4    ibuprofen (MOTRIN) 600 mg tablet TAKE 1 TABLET EVERY 12 HOURS AS NEEDED FOR MODERATE PAIN    metoprolol succinate (TOPROL-XL) 50 mg 24 hr tablet TAKE 1 TABLET DAILY    Multiple Vitamin (MULTIVITAMIN) tablet 1 tablet, Oral, Daily    Omega-3 Fatty Acids (Ultra Omega-3 Fish Oil) 1400 MG CAPS     pantoprazole (PROTONIX) 40 mg, Oral, Daily    rosuvastatin (CRESTOR) 10 MG tablet TAKE 1 TABLET DAILY    spironolactone (ALDACTONE)  "25 mg tablet TAKE 1 TABLET DAILY     Allergies   Allergen Reactions    Codeine Other (See Comments)     \"throat swells\"    Keflex [Cephalexin] Hyperactivity     Pt stated she felt \"sped up\"       PHYSICAL EXAM      Objective   Blood pressure 127/88, pulse 66, temperature 98.4 °F (36.9 °C), temperature source Tympanic, resp. rate 18, height 5' 8\" (1.727 m), weight 95.6 kg (210 lb 12.8 oz), SpO2 98%. Body mass index is 32.05 kg/m².    General Appearance:   Alert, cooperative, no distress   HEENT:   Normocephalic, atraumatic, anicteric     Neck:   Supple, symmetrical, trachea midline   Lungs:   Equal chest rise, respirations unlabored    Heart:   Regular rate and rhythm   Abdomen:   Soft, non-tender, non-distended; normal bowel sounds; no masses, no organomegaly    Rectal:   Deferred    Extremities:   No cyanosis, clubbing or edema    Neuro:   Moves all 4 extremities    Skin:   No jaundice, rashes, or lesions      LABORATORY RESULTS     No visits with results within 1 Day(s) from this visit.   Latest known visit with results is:   Hospital Outpatient Visit on 12/29/2023   Component Date Value    Case Report 12/29/2023                      Value:Surgical Pathology Report                         Case: [id number]                                   Authorizing Provider:  TRINITY Erazo      Collected:           12/29/2023 1010              Ordering Location:     [facility]      Received:            12/29/2023 [address] [place] [address] MRI                                                                 Pathologist:           Channing Huerta MD                                                            Specimen:    Breast, Right, Mri guided right side breast biopsy  7 passes  5:00 lower inner Mri                  guided right side breast biopsy  7 passes  5:00 lower inner                                Final Diagnosis 12/29/2023                      Value:This result contains rich text " formatting which cannot be displayed here.    Note 12/29/2023                      Value:This result contains rich text formatting which cannot be displayed here.    Additional Information 12/29/2023                      Value:This result contains rich text formatting which cannot be displayed here.    Gross Description 12/29/2023                      Value:This result contains rich text formatting which cannot be displayed here.    Clinical Information 12/29/2023                      Value:Mri guided right side breast biopsy  7 passes  5:00 lower inner 9 mm oval mass with irregular margins         IMAGING RESULTS     No results found.  I have personally reviewed any available and pertinent imaging study reports.      Franco Walters D.O.  [facility]  Division of Gastroenterology & Hepatology  Available on [employer]  [email or web address]    ** Please Note: This note is constructed using a voice recognition dictation system. **

## 2024-02-06 DIAGNOSIS — M54.16 LUMBAR RADICULOPATHY, ACUTE: ICD-10-CM

## 2024-02-06 RX ORDER — IBUPROFEN 600 MG/1
TABLET ORAL
Qty: 180 TABLET | Refills: 3 | Status: SHIPPED | OUTPATIENT
Start: 2024-02-06

## 2024-03-11 NOTE — PROGRESS NOTES
Assessment/Plan:    Discussed Veozah. Pt states she will do some research and call the office if she would like to start. She was made aware that LFTs will need to be checked prior to starting at 3,6 and 9 months after starting.   She will schedule appt for consult with Dr. Grant for ELDON surgery.  Recommended monthly SBE, annual CBE and B/L breast MRI scheduled. ASCCP guidelines reviewed and pap with cotesting repeated. Colonoscopy noted to be up to date. Discussed postmenopausal considerations and symptoms to report. Kegel exercises as instructed. RTO in one year for routine annual gyn exam or sooner PRN.        Diagnoses and all orders for this visit:    Encounter for gynecological examination (general) (routine) with abnormal findings    ELDON (stress urinary incontinence, female)        Subjective:      Patient ID: Dinorah Horta is a 53 y.o. female.    This patient presents for routine annual gyn exam.   She is s/p hyst.   She was started on vivelle dot on 3/7/23 for VM sx impacting QOL. Breast team did not wish for pt to be on hormone.   Pt also reports worsening LEDON. She would like to avoid surgery and chose to work on Kegels. Today she states, sx have worsened and she would like to proceed with surgery. Denies OAB.   Hx of hyst for AUB, patient states she was told pathology was precancerous and was advised to continue paps.   She denies vaginal bleeding or spotting,  pelvic pain, dyspareunia, breast concerns, abnormal discharge, bowel dysfunction, depression/anx.   , sexually active and is monogamous.  Pap/HPV up to date and normal, 3/7/23.  Right breast biopsy after B/L breast MRI. Next MRI scheduled.  Colonoscopy done 7/17/23.           The following portions of the patient's history were reviewed and updated as appropriate: allergies, current medications, past family history, past medical history, past social history, past surgical history and problem list.    Review of Systems   Constitutional:  "Negative.    Respiratory: Negative.     Cardiovascular: Negative.    Gastrointestinal: Negative.    Endocrine: Negative.    Genitourinary:  Negative for dyspareunia, dysuria, frequency, pelvic pain, urgency, vaginal bleeding, vaginal discharge and vaginal pain.   Musculoskeletal: Negative.    Skin: Negative.    Neurological: Negative.    Psychiatric/Behavioral: Negative.           Objective:      /80 (BP Location: Right arm, Patient Position: Sitting, Cuff Size: Standard)   Ht 5' 8\" (1.727 m)   Wt 91.6 kg (202 lb)   BMI 30.71 kg/m²          Physical Exam  Vitals and nursing note reviewed. Exam conducted with a chaperone present.   Constitutional:       Appearance: Normal appearance. She is well-developed.   HENT:      Head: Normocephalic and atraumatic.   Neck:      Thyroid: No thyroid mass or thyromegaly.   Cardiovascular:      Rate and Rhythm: Normal rate and regular rhythm.      Heart sounds: Normal heart sounds.   Pulmonary:      Effort: Pulmonary effort is normal.      Breath sounds: Normal breath sounds.   Chest:   Breasts:     Breasts are symmetrical.      Right: No inverted nipple, mass, nipple discharge, skin change or tenderness.      Left: No inverted nipple, mass, nipple discharge, skin change or tenderness.   Abdominal:      General: Bowel sounds are normal.      Palpations: Abdomen is soft.      Tenderness: There is no abdominal tenderness.      Hernia: There is no hernia in the left inguinal area or right inguinal area.   Genitourinary:     General: Normal vulva.      Exam position: Supine.      Pubic Area: No rash.       Labia:         Right: No rash, tenderness, lesion or injury.         Left: No rash, tenderness, lesion or injury.       Urethra: No prolapse, urethral pain, urethral swelling or urethral lesion.      Vagina: Normal. No signs of injury and foreign body. No vaginal discharge, erythema, tenderness, bleeding, lesions or prolapsed vaginal walls.      Adnexa:         Right: No " mass, tenderness or fullness.          Left: No mass, tenderness or fullness.        Rectum: No external hemorrhoid.      Comments: Urethra normal without lesions  No bladder tenderness  Cervix, uterus absent, no masses or tenderness on BME  Musculoskeletal:         General: Normal range of motion.      Cervical back: Normal range of motion and neck supple.   Lymphadenopathy:      Lower Body: No right inguinal adenopathy. No left inguinal adenopathy.   Skin:     General: Skin is warm and dry.   Neurological:      Mental Status: She is alert and oriented to person, place, and time.   Psychiatric:         Speech: Speech normal.         Behavior: Behavior normal. Behavior is cooperative.

## 2024-03-19 ENCOUNTER — ANNUAL EXAM (OUTPATIENT)
Dept: GYNECOLOGY | Facility: CLINIC | Age: 54
End: 2024-03-19
Payer: COMMERCIAL

## 2024-03-19 VITALS
HEIGHT: 68 IN | SYSTOLIC BLOOD PRESSURE: 132 MMHG | DIASTOLIC BLOOD PRESSURE: 80 MMHG | WEIGHT: 202 LBS | BODY MASS INDEX: 30.62 KG/M2

## 2024-03-19 DIAGNOSIS — Z01.411 ENCOUNTER FOR GYNECOLOGICAL EXAMINATION (GENERAL) (ROUTINE) WITH ABNORMAL FINDINGS: Primary | ICD-10-CM

## 2024-03-19 DIAGNOSIS — Z12.4 ENCOUNTER FOR PAPANICOLAOU SMEAR FOR CERVICAL CANCER SCREENING: ICD-10-CM

## 2024-03-19 DIAGNOSIS — N39.3 SUI (STRESS URINARY INCONTINENCE, FEMALE): ICD-10-CM

## 2024-03-19 PROCEDURE — S0612 ANNUAL GYNECOLOGICAL EXAMINA: HCPCS | Performed by: OBSTETRICS & GYNECOLOGY

## 2024-03-19 PROCEDURE — G0476 HPV COMBO ASSAY CA SCREEN: HCPCS | Performed by: OBSTETRICS & GYNECOLOGY

## 2024-03-27 ENCOUNTER — OFFICE VISIT (OUTPATIENT)
Dept: GYNECOLOGY | Facility: CLINIC | Age: 54
End: 2024-03-27
Payer: COMMERCIAL

## 2024-03-27 VITALS
HEIGHT: 68 IN | WEIGHT: 202 LBS | BODY MASS INDEX: 30.62 KG/M2 | SYSTOLIC BLOOD PRESSURE: 132 MMHG | DIASTOLIC BLOOD PRESSURE: 80 MMHG

## 2024-03-27 DIAGNOSIS — N39.3 SUI (STRESS URINARY INCONTINENCE, FEMALE): ICD-10-CM

## 2024-03-27 DIAGNOSIS — N36.41 URETHRAL HYPERMOBILITY: ICD-10-CM

## 2024-03-27 DIAGNOSIS — Z01.818 PREOP TESTING: Primary | ICD-10-CM

## 2024-03-27 PROCEDURE — 99215 OFFICE O/P EST HI 40 MIN: CPT | Performed by: OBSTETRICS & GYNECOLOGY

## 2024-03-27 NOTE — PROGRESS NOTES
Assessment/Plan:         Diagnoses and all orders for this visit:    Preop testing  -     ECG 12 lead; Future  -     CBC and differential; Future    ELDON (stress urinary incontinence, female); discussed options.  Patient desires surgical correction.  Discussed risks of surgery including but not limited to infection, hemorrhage, mesh exposure, prolonged catheterization.    Urethral hypermobility      Subjective:      Patient ID: Dinorah Horta is a 53 y.o. female.    HPI G4 P 3013,  X3, prior TVH, with stress urinary incontinence.  This has been an ongoing issue for several years.  It has significantly become worse over the past year.  She denies any dysuria, hematuria urgency or urgency incontinence.    The following portions of the patient's history were reviewed and updated as appropriate: She  has a past medical history of Hyperlipidemia, Hypertension, and Menopause ovarian failure.  She   Patient Active Problem List    Diagnosis Date Noted    Diverticulosis 2024    Oropharyngeal dysphagia 2023    Hiatal hernia 2023    Gastroesophageal reflux disease 10/26/2022    Lumbar facet arthropathy 2022    Lumbar radiculopathy 2022    Vitamin D deficiency 2015    Mixed hyperlipidemia 04/10/2015    Anxiety 02/10/2014    Hypertension 2012     She  has a past surgical history that includes Cholecystectomy (); Tooth extraction; Hysterectomy; Breast biopsy (Left, ); Epidural block injection (Right, 2021); Epidural block injection (N/A, 2021); NERVE BLOCK (Bilateral, 02/15/2022); NERVE BLOCK (Bilateral, 2022); FL guided needle plac bx/asp/inj (2022); Rhizotomy (Bilateral, 2022); FL guided needle plac bx/asp/inj (2022); Breast biopsy (Right, 2022); US guided breast biopsy right complete (Right, 2022); Rhizotomy (Bilateral, 11/15/2022); and MRI breast biopsy right (all inclusive) (Right, 2023).  Her family history includes  Alzheimer's disease in her mother; Asthma in her daughter; Breast cancer (age of onset: 45) in her cousin; Breast cancer (age of onset: 63) in her sister; Emphysema in her maternal aunt; Heart attack in her father; Heart disease in her father; Lung cancer in her maternal grandfather, mother, and paternal grandfather; No Known Problems in her paternal aunt, paternal aunt, paternal aunt, paternal grandmother, son, and son; Prostate cancer in her father; Thyroid disease in her daughter and sister; Tongue cancer in her maternal grandmother.  She  reports that she has never smoked. She has never used smokeless tobacco. She reports current alcohol use of about 2.0 standard drinks of alcohol per week. She reports that she does not use drugs.  Current Outpatient Medications   Medication Sig Dispense Refill    Calcium-Magnesium-Vitamin D (CALCIUM 1200+D3 PO)       cetirizine (ZyrTEC) 10 MG chewable tablet Chew 10 mg daily      Cholecalciferol 50 MCG (2000 UT) CAPS Take by mouth daily      desvenlafaxine succinate (PRISTIQ) 50 mg 24 hr tablet TAKE 1 TABLET DAILY 90 tablet 3     MG tablet TAKE 1 TABLET EVERY 12 HOURS AS NEEDED FOR MODERATE PAIN 180 tablet 3    metoprolol succinate (TOPROL-XL) 50 mg 24 hr tablet TAKE 1 TABLET DAILY 90 tablet 3    Multiple Vitamin (MULTIVITAMIN) tablet Take 1 tablet by mouth daily      Omega-3 Fatty Acids (Ultra Omega-3 Fish Oil) 1400 MG CAPS       pantoprazole (PROTONIX) 40 mg tablet Take 1 tablet (40 mg total) by mouth daily 90 tablet 2    rosuvastatin (CRESTOR) 10 MG tablet TAKE 1 TABLET DAILY 90 tablet 3    spironolactone (ALDACTONE) 25 mg tablet TAKE 1 TABLET DAILY 90 tablet 3     No current facility-administered medications for this visit.     Current Outpatient Medications on File Prior to Visit   Medication Sig    Calcium-Magnesium-Vitamin D (CALCIUM 1200+D3 PO)     cetirizine (ZyrTEC) 10 MG chewable tablet Chew 10 mg daily    Cholecalciferol 50 MCG (2000 UT) CAPS Take by mouth  "daily    desvenlafaxine succinate (PRISTIQ) 50 mg 24 hr tablet TAKE 1 TABLET DAILY     MG tablet TAKE 1 TABLET EVERY 12 HOURS AS NEEDED FOR MODERATE PAIN    metoprolol succinate (TOPROL-XL) 50 mg 24 hr tablet TAKE 1 TABLET DAILY    Multiple Vitamin (MULTIVITAMIN) tablet Take 1 tablet by mouth daily    Omega-3 Fatty Acids (Ultra Omega-3 Fish Oil) 1400 MG CAPS     pantoprazole (PROTONIX) 40 mg tablet Take 1 tablet (40 mg total) by mouth daily    rosuvastatin (CRESTOR) 10 MG tablet TAKE 1 TABLET DAILY    spironolactone (ALDACTONE) 25 mg tablet TAKE 1 TABLET DAILY    [DISCONTINUED] Linda 0.05 MG/24HR apply 1 patch two times a week as directed    [DISCONTINUED] fluconazole (DIFLUCAN) 150 mg tablet take 1 tablet by mouth once daily ON DAYS 1 AND 4     No current facility-administered medications on file prior to visit.     She is allergic to codeine and keflex [cephalexin]..    Review of Systems      Objective:      /80   Ht 5' 8\" (1.727 m)   Wt 91.6 kg (202 lb)   BMI 30.71 kg/m²          Physical Exam  Vitals reviewed.   Cardiovascular:      Rate and Rhythm: Normal rate and regular rhythm.      Pulses: Normal pulses.      Heart sounds: Normal heart sounds.   Pulmonary:      Effort: Pulmonary effort is normal.      Breath sounds: Normal breath sounds.   Abdominal:      General: There is no distension.      Palpations: Abdomen is soft. There is no mass.      Tenderness: There is no abdominal tenderness. There is no guarding or rebound.      Hernia: No hernia is present. There is no hernia in the left inguinal area or right inguinal area.   Genitourinary:     General: Normal vulva.      Labia:         Right: No rash, tenderness or lesion.         Left: No rash, tenderness or lesion.       Vagina: Normal.      Uterus: Absent.       Adnexa:         Right: No mass, tenderness or fullness.          Left: No mass, tenderness or fullness.        Comments: Urethra hypermobile  Lymphadenopathy:      Lower Body: No " right inguinal adenopathy. No left inguinal adenopathy.   Neurological:      Mental Status: She is alert.

## 2024-03-28 ENCOUNTER — PREP FOR PROCEDURE (OUTPATIENT)
Dept: GYNECOLOGY | Facility: CLINIC | Age: 54
End: 2024-03-28

## 2024-03-28 DIAGNOSIS — N39.3 SUI (STRESS URINARY INCONTINENCE, FEMALE): Primary | ICD-10-CM

## 2024-04-02 LAB
LAB AP GYN PRIMARY INTERPRETATION: NORMAL
Lab: NORMAL

## 2024-04-25 DIAGNOSIS — F41.8 ANXIETY ASSOCIATED WITH DEPRESSION: ICD-10-CM

## 2024-04-25 RX ORDER — DESVENLAFAXINE SUCCINATE 50 MG/1
TABLET, EXTENDED RELEASE ORAL
Qty: 90 TABLET | Refills: 0 | Status: SHIPPED | OUTPATIENT
Start: 2024-04-25

## 2024-05-04 ENCOUNTER — OFFICE VISIT (OUTPATIENT)
Dept: LAB | Facility: HOSPITAL | Age: 54
End: 2024-05-04
Payer: COMMERCIAL

## 2024-05-04 ENCOUNTER — APPOINTMENT (OUTPATIENT)
Dept: LAB | Facility: HOSPITAL | Age: 54
End: 2024-05-04
Payer: COMMERCIAL

## 2024-05-04 DIAGNOSIS — Z01.818 PREOP TESTING: ICD-10-CM

## 2024-05-04 LAB
BASOPHILS # BLD AUTO: 0.06 THOUSANDS/ÂΜL (ref 0–0.1)
BASOPHILS NFR BLD AUTO: 1 % (ref 0–1)
EOSINOPHIL # BLD AUTO: 0.1 THOUSAND/ÂΜL (ref 0–0.61)
EOSINOPHIL NFR BLD AUTO: 2 % (ref 0–6)
ERYTHROCYTE [DISTWIDTH] IN BLOOD BY AUTOMATED COUNT: 12.9 % (ref 11.6–15.1)
HCT VFR BLD AUTO: 43.8 % (ref 34.8–46.1)
HGB BLD-MCNC: 14.5 G/DL (ref 11.5–15.4)
IMM GRANULOCYTES # BLD AUTO: 0.01 THOUSAND/UL (ref 0–0.2)
IMM GRANULOCYTES NFR BLD AUTO: 0 % (ref 0–2)
LYMPHOCYTES # BLD AUTO: 1.58 THOUSANDS/ÂΜL (ref 0.6–4.47)
LYMPHOCYTES NFR BLD AUTO: 32 % (ref 14–44)
MCH RBC QN AUTO: 30.2 PG (ref 26.8–34.3)
MCHC RBC AUTO-ENTMCNC: 33.1 G/DL (ref 31.4–37.4)
MCV RBC AUTO: 91 FL (ref 82–98)
MONOCYTES # BLD AUTO: 0.51 THOUSAND/ÂΜL (ref 0.17–1.22)
MONOCYTES NFR BLD AUTO: 11 % (ref 4–12)
NEUTROPHILS # BLD AUTO: 2.62 THOUSANDS/ÂΜL (ref 1.85–7.62)
NEUTS SEG NFR BLD AUTO: 54 % (ref 43–75)
NRBC BLD AUTO-RTO: 0 /100 WBCS
PLATELET # BLD AUTO: 214 THOUSANDS/UL (ref 149–390)
PMV BLD AUTO: 10.2 FL (ref 8.9–12.7)
RBC # BLD AUTO: 4.8 MILLION/UL (ref 3.81–5.12)
WBC # BLD AUTO: 4.88 THOUSAND/UL (ref 4.31–10.16)

## 2024-05-04 PROCEDURE — 36415 COLL VENOUS BLD VENIPUNCTURE: CPT

## 2024-05-04 PROCEDURE — 85025 COMPLETE CBC W/AUTO DIFF WBC: CPT

## 2024-05-04 PROCEDURE — 93005 ELECTROCARDIOGRAM TRACING: CPT

## 2024-05-06 ENCOUNTER — HOSPITAL ENCOUNTER (OUTPATIENT)
Dept: MAMMOGRAPHY | Facility: HOSPITAL | Age: 54
Discharge: HOME | End: 2024-05-06
Attending: FAMILY MEDICINE
Payer: COMMERCIAL

## 2024-05-06 VITALS — HEIGHT: 68 IN | WEIGHT: 202 LBS | BODY MASS INDEX: 30.62 KG/M2

## 2024-05-06 DIAGNOSIS — Z12.31 OTHER SCREENING MAMMOGRAM: ICD-10-CM

## 2024-05-06 LAB
ATRIAL RATE: 60 BPM
P AXIS: 22 DEGREES
PR INTERVAL: 188 MS
QRS AXIS: 4 DEGREES
QRSD INTERVAL: 82 MS
QT INTERVAL: 410 MS
QTC INTERVAL: 410 MS
T WAVE AXIS: 38 DEGREES
VENTRICULAR RATE: 60 BPM

## 2024-05-06 PROCEDURE — 93010 ELECTROCARDIOGRAM REPORT: CPT | Performed by: INTERNAL MEDICINE

## 2024-05-06 PROCEDURE — 77067 SCR MAMMO BI INCL CAD: CPT

## 2024-05-06 PROCEDURE — 77063 BREAST TOMOSYNTHESIS BI: CPT

## 2024-05-13 ENCOUNTER — TELEPHONE (OUTPATIENT)
Age: 54
End: 2024-05-13

## 2024-05-13 NOTE — TELEPHONE ENCOUNTER
Patient had surgery scheduled for today 5/13 with Dr. Blackwood, however she was informed the surgery was cancelled and needed to be rescheduled. Contact number on file to reschedule.

## 2024-05-14 ENCOUNTER — OFFICE VISIT (OUTPATIENT)
Dept: FAMILY MEDICINE CLINIC | Facility: CLINIC | Age: 54
End: 2024-05-14
Payer: COMMERCIAL

## 2024-05-14 ENCOUNTER — ANESTHESIA EVENT (OUTPATIENT)
Dept: PERIOP | Facility: HOSPITAL | Age: 54
End: 2024-05-14
Payer: COMMERCIAL

## 2024-05-14 ENCOUNTER — APPOINTMENT (OUTPATIENT)
Dept: LAB | Facility: MEDICAL CENTER | Age: 54
End: 2024-05-14
Payer: COMMERCIAL

## 2024-05-14 VITALS
HEIGHT: 68 IN | TEMPERATURE: 96 F | OXYGEN SATURATION: 96 % | DIASTOLIC BLOOD PRESSURE: 82 MMHG | BODY MASS INDEX: 29.7 KG/M2 | SYSTOLIC BLOOD PRESSURE: 118 MMHG | HEART RATE: 88 BPM | WEIGHT: 196 LBS

## 2024-05-14 DIAGNOSIS — Z00.00 ANNUAL PHYSICAL EXAM: Primary | ICD-10-CM

## 2024-05-14 DIAGNOSIS — E78.2 MIXED HYPERLIPIDEMIA: ICD-10-CM

## 2024-05-14 LAB
ANION GAP SERPL CALCULATED.3IONS-SCNC: 11 MMOL/L (ref 4–13)
BUN SERPL-MCNC: 18 MG/DL (ref 5–25)
CALCIUM SERPL-MCNC: 9.6 MG/DL (ref 8.4–10.2)
CHLORIDE SERPL-SCNC: 103 MMOL/L (ref 96–108)
CHOLEST SERPL-MCNC: 192 MG/DL
CO2 SERPL-SCNC: 27 MMOL/L (ref 21–32)
CREAT SERPL-MCNC: 0.68 MG/DL (ref 0.6–1.3)
GFR SERPL CREATININE-BSD FRML MDRD: 98 ML/MIN/1.73SQ M
GLUCOSE P FAST SERPL-MCNC: 109 MG/DL (ref 65–99)
HDLC SERPL-MCNC: 64 MG/DL
LDLC SERPL CALC-MCNC: 100 MG/DL (ref 0–100)
NONHDLC SERPL-MCNC: 128 MG/DL
POTASSIUM SERPL-SCNC: 3.8 MMOL/L (ref 3.5–5.3)
SODIUM SERPL-SCNC: 141 MMOL/L (ref 135–147)
TRIGL SERPL-MCNC: 142 MG/DL

## 2024-05-14 PROCEDURE — 99396 PREV VISIT EST AGE 40-64: CPT | Performed by: FAMILY MEDICINE

## 2024-05-14 PROCEDURE — 80048 BASIC METABOLIC PNL TOTAL CA: CPT | Performed by: FAMILY MEDICINE

## 2024-05-14 PROCEDURE — 80061 LIPID PANEL: CPT | Performed by: FAMILY MEDICINE

## 2024-05-14 PROCEDURE — 36415 COLL VENOUS BLD VENIPUNCTURE: CPT | Performed by: FAMILY MEDICINE

## 2024-05-14 NOTE — PROGRESS NOTES
Assessment/Plan: Patient will require a lipid panel a flow blood sugar and the rest of the requirements she has already carried out    Problem List Items Addressed This Visit    None  Visit Diagnoses       Annual physical exam    -  Primary             Diagnoses and all orders for this visit:    Annual physical exam        No problem-specific Assessment & Plan notes found for this encounter.        Subjective:      Patient ID: Dinorah Horta is a 53 y.o. female.    Mrs. Mcbride is here today for the purposes of a health maintenance exam annual exam this is required by her employer for her screening for biometric        The following portions of the patient's history were reviewed and updated as appropriate:   She has a past medical history of Arthritis, Chronic pain disorder, Diverticulosis, GERD (gastroesophageal reflux disease), Hyperlipidemia, Hypertension, Lumbar herniated disc, Menopause ovarian failure, and PONV (postoperative nausea and vomiting).,  does not have any pertinent problems on file.,   has a past surgical history that includes Cholecystectomy (1995); Tooth extraction; Hysterectomy; Breast biopsy (Left, 2009); Epidural block injection (Right, 12/02/2021); Epidural block injection (N/A, 12/28/2021); NERVE BLOCK (Bilateral, 02/15/2022); NERVE BLOCK (Bilateral, 03/03/2022); FL guided needle plac bx/asp/inj (03/03/2022); Rhizotomy (Bilateral, 04/14/2022); FL guided needle plac bx/asp/inj (04/14/2022); Breast biopsy (Right, 05/18/2022); US guided breast biopsy right complete (Right, 05/18/2022); Rhizotomy (Bilateral, 11/15/2022); and MRI breast biopsy right (all inclusive) (Right, 12/29/2023).,  family history includes Alzheimer's disease in her mother; Asthma in her daughter; Breast cancer (age of onset: 45) in her cousin; Breast cancer (age of onset: 63) in her sister; Emphysema in her maternal aunt; Heart attack in her father; Heart disease in her father; Lung cancer in her maternal grandfather, mother,  and paternal grandfather; No Known Problems in her paternal aunt, paternal aunt, paternal aunt, paternal grandmother, son, and son; Prostate cancer in her father; Thyroid disease in her daughter and sister; Tongue cancer in her maternal grandmother.,   reports that she has never smoked. She has never used smokeless tobacco. She reports current alcohol use of about 2.0 standard drinks of alcohol per week. She reports that she does not use drugs.,  is allergic to keflex [cephalexin] and codeine..  Current Outpatient Medications   Medication Sig Dispense Refill    ascorbic acid (VITAMIN C) 1000 MG tablet       B COMPLEX-BIOTIN-FA ER PO Take by mouth      Calcium-Magnesium-Vitamin D (CALCIUM 1200+D3 PO)       cetirizine (ZyrTEC) 10 MG chewable tablet Chew 10 mg daily      Cholecalciferol 50 MCG (2000 UT) CAPS Take by mouth daily      desvenlafaxine succinate (PRISTIQ) 50 mg 24 hr tablet TAKE 1 TABLET DAILY 90 tablet 0     MG tablet TAKE 1 TABLET EVERY 12 HOURS AS NEEDED FOR MODERATE PAIN 180 tablet 3    metoprolol succinate (TOPROL-XL) 50 mg 24 hr tablet TAKE 1 TABLET DAILY 90 tablet 3    Multiple Vitamin (MULTIVITAMIN) tablet Take 1 tablet by mouth daily      Omega-3 Fatty Acids (Ultra Omega-3 Fish Oil) 1400 MG CAPS       pantoprazole (PROTONIX) 40 mg tablet Take 1 tablet (40 mg total) by mouth daily 90 tablet 2    rosuvastatin (CRESTOR) 10 MG tablet TAKE 1 TABLET DAILY 90 tablet 3    spironolactone (ALDACTONE) 25 mg tablet TAKE 1 TABLET DAILY 90 tablet 3    TURMERIC PO Take by mouth       No current facility-administered medications for this visit.       Review of Systems   Constitutional:  Negative for activity change, appetite change, diaphoresis, fatigue and fever.   HENT: Negative.  Negative for dental problem.    Eyes:  Positive for visual disturbance.   Respiratory:  Negative for apnea, cough, chest tightness, shortness of breath and wheezing.    Cardiovascular:  Negative for chest pain, palpitations and  "leg swelling.   Gastrointestinal:  Negative for abdominal distention, abdominal pain, anal bleeding, constipation, diarrhea, nausea and vomiting.   Endocrine: Negative for cold intolerance, heat intolerance, polydipsia, polyphagia and polyuria.   Genitourinary:  Positive for enuresis and urgency. Negative for difficulty urinating, dysuria, flank pain and hematuria.   Musculoskeletal:  Negative for arthralgias, back pain, gait problem, joint swelling and myalgias.   Skin:  Negative for color change, rash and wound.   Allergic/Immunologic: Negative for environmental allergies, food allergies and immunocompromised state.   Neurological:  Negative for dizziness, seizures, syncope, speech difficulty, numbness and headaches.   Hematological:  Negative for adenopathy. Does not bruise/bleed easily.   Psychiatric/Behavioral:  Negative for agitation, behavioral problems, hallucinations, sleep disturbance and suicidal ideas.          Objective:  Vitals:    05/14/24 0752   BP: 118/82   BP Location: Left arm   Patient Position: Sitting   Cuff Size: Standard   Pulse: 88   Temp: (!) 96 °F (35.6 °C)   TempSrc: Temporal   SpO2: 96%   Weight: 88.9 kg (196 lb)   Height: 5' 8\" (1.727 m)     Body mass index is 29.8 kg/m².     Physical Exam  Constitutional:       General: She is not in acute distress.     Appearance: She is well-developed. She is not diaphoretic.   HENT:      Head: Normocephalic.      Right Ear: External ear normal.      Left Ear: External ear normal.      Nose: Nose normal.   Eyes:      General: No scleral icterus.        Right eye: No discharge.         Left eye: No discharge.      Conjunctiva/sclera: Conjunctivae normal.      Pupils: Pupils are equal, round, and reactive to light.   Neck:      Thyroid: No thyromegaly.      Trachea: No tracheal deviation.   Cardiovascular:      Rate and Rhythm: Normal rate and regular rhythm.      Heart sounds: Normal heart sounds. No murmur heard.     No friction rub. No gallop. "   Pulmonary:      Effort: Pulmonary effort is normal. No respiratory distress.      Breath sounds: Normal breath sounds. No wheezing.   Abdominal:      General: Bowel sounds are normal.      Palpations: Abdomen is soft. There is no mass.      Tenderness: There is no abdominal tenderness. There is no guarding.   Musculoskeletal:         General: No deformity.      Cervical back: Normal range of motion.   Lymphadenopathy:      Cervical: No cervical adenopathy.   Skin:     General: Skin is warm and dry.      Findings: No erythema or rash.   Neurological:      Mental Status: She is alert and oriented to person, place, and time.      Cranial Nerves: No cranial nerve deficit.   Psychiatric:         Thought Content: Thought content normal.

## 2024-05-15 RX ORDER — SODIUM CHLORIDE, SODIUM LACTATE, POTASSIUM CHLORIDE, CALCIUM CHLORIDE 600; 310; 30; 20 MG/100ML; MG/100ML; MG/100ML; MG/100ML
125 INJECTION, SOLUTION INTRAVENOUS CONTINUOUS
Status: CANCELLED | OUTPATIENT
Start: 2024-05-15

## 2024-05-15 NOTE — RESULT ENCOUNTER NOTE
Call patient to notify normal results call and give the patient the values on her chemistry panel she can probably see them on her MyChart because she has to fill them in on her history her physical exam for her employer and then send it back to them

## 2024-05-16 ENCOUNTER — ANESTHESIA (OUTPATIENT)
Dept: PERIOP | Facility: HOSPITAL | Age: 54
End: 2024-05-16
Payer: COMMERCIAL

## 2024-05-16 ENCOUNTER — HOSPITAL ENCOUNTER (OUTPATIENT)
Facility: HOSPITAL | Age: 54
Setting detail: OUTPATIENT SURGERY
Discharge: HOME | End: 2024-05-16
Attending: OBSTETRICS & GYNECOLOGY | Admitting: OBSTETRICS & GYNECOLOGY
Payer: COMMERCIAL

## 2024-05-16 VITALS
WEIGHT: 199.3 LBS | BODY MASS INDEX: 30.3 KG/M2 | TEMPERATURE: 97.8 F | RESPIRATION RATE: 16 BRPM | SYSTOLIC BLOOD PRESSURE: 113 MMHG | OXYGEN SATURATION: 96 % | HEART RATE: 61 BPM | DIASTOLIC BLOOD PRESSURE: 59 MMHG

## 2024-05-16 DIAGNOSIS — N39.3 FEMALE STRESS INCONTINENCE: ICD-10-CM

## 2024-05-16 PROCEDURE — C1771 REP DEV, URINARY, W/SLING: HCPCS | Performed by: OBSTETRICS & GYNECOLOGY

## 2024-05-16 PROCEDURE — 57288 REPAIR BLADDER DEFECT: CPT | Performed by: OBSTETRICS & GYNECOLOGY

## 2024-05-16 DEVICE — SINGLE INCISION SLING SYSTEM
Type: IMPLANTABLE DEVICE | Site: VAGINA | Status: FUNCTIONAL
Brand: ALTIS

## 2024-05-16 RX ORDER — ONDANSETRON 2 MG/ML
4 INJECTION INTRAMUSCULAR; INTRAVENOUS ONCE AS NEEDED
Status: COMPLETED | OUTPATIENT
Start: 2024-05-16 | End: 2024-05-16

## 2024-05-16 RX ORDER — SODIUM CHLORIDE, SODIUM LACTATE, POTASSIUM CHLORIDE, CALCIUM CHLORIDE 600; 310; 30; 20 MG/100ML; MG/100ML; MG/100ML; MG/100ML
125 INJECTION, SOLUTION INTRAVENOUS CONTINUOUS
Status: DISCONTINUED | OUTPATIENT
Start: 2024-05-16 | End: 2024-05-16 | Stop reason: HOSPADM

## 2024-05-16 RX ORDER — FENTANYL CITRATE/PF 50 MCG/ML
25 SYRINGE (ML) INJECTION
Status: DISCONTINUED | OUTPATIENT
Start: 2024-05-16 | End: 2024-05-16 | Stop reason: HOSPADM

## 2024-05-16 RX ORDER — ACETAMINOPHEN 325 MG/1
975 TABLET ORAL ONCE
Status: COMPLETED | OUTPATIENT
Start: 2024-05-16 | End: 2024-05-16

## 2024-05-16 RX ORDER — FENTANYL CITRATE 50 UG/ML
INJECTION, SOLUTION INTRAMUSCULAR; INTRAVENOUS AS NEEDED
Status: DISCONTINUED | OUTPATIENT
Start: 2024-05-16 | End: 2024-05-16

## 2024-05-16 RX ORDER — PROPOFOL 10 MG/ML
INJECTION, EMULSION INTRAVENOUS AS NEEDED
Status: DISCONTINUED | OUTPATIENT
Start: 2024-05-16 | End: 2024-05-16

## 2024-05-16 RX ORDER — PROMETHAZINE HYDROCHLORIDE 25 MG/ML
12.5 INJECTION, SOLUTION INTRAMUSCULAR; INTRAVENOUS ONCE AS NEEDED
Status: DISCONTINUED | OUTPATIENT
Start: 2024-05-16 | End: 2024-05-16 | Stop reason: HOSPADM

## 2024-05-16 RX ORDER — IBUPROFEN 600 MG/1
600 TABLET ORAL EVERY 6 HOURS PRN
Status: DISCONTINUED | OUTPATIENT
Start: 2024-05-16 | End: 2024-05-16 | Stop reason: HOSPADM

## 2024-05-16 RX ORDER — HYDROMORPHONE HCL/PF 1 MG/ML
0.5 SYRINGE (ML) INJECTION
Status: DISCONTINUED | OUTPATIENT
Start: 2024-05-16 | End: 2024-05-16 | Stop reason: HOSPADM

## 2024-05-16 RX ORDER — ACETAMINOPHEN 325 MG/1
975 TABLET ORAL EVERY 6 HOURS PRN
Status: DISCONTINUED | OUTPATIENT
Start: 2024-05-16 | End: 2024-05-16 | Stop reason: HOSPADM

## 2024-05-16 RX ORDER — MAGNESIUM HYDROXIDE 1200 MG/15ML
LIQUID ORAL AS NEEDED
Status: DISCONTINUED | OUTPATIENT
Start: 2024-05-16 | End: 2024-05-16 | Stop reason: HOSPADM

## 2024-05-16 RX ORDER — MIDAZOLAM HYDROCHLORIDE 2 MG/2ML
INJECTION, SOLUTION INTRAMUSCULAR; INTRAVENOUS AS NEEDED
Status: DISCONTINUED | OUTPATIENT
Start: 2024-05-16 | End: 2024-05-16

## 2024-05-16 RX ORDER — ALBUTEROL SULFATE 2.5 MG/3ML
2.5 SOLUTION RESPIRATORY (INHALATION) ONCE AS NEEDED
Status: DISCONTINUED | OUTPATIENT
Start: 2024-05-16 | End: 2024-05-16 | Stop reason: HOSPADM

## 2024-05-16 RX ORDER — CLINDAMYCIN PHOSPHATE 900 MG/50ML
900 INJECTION, SOLUTION INTRAVENOUS ONCE
Status: COMPLETED | OUTPATIENT
Start: 2024-05-16 | End: 2024-05-16

## 2024-05-16 RX ADMIN — MIDAZOLAM 2 MG: 1 INJECTION INTRAMUSCULAR; INTRAVENOUS at 07:28

## 2024-05-16 RX ADMIN — ONDANSETRON 4 MG: 2 INJECTION INTRAMUSCULAR; INTRAVENOUS at 08:37

## 2024-05-16 RX ADMIN — PROPOFOL 100 MCG/KG/MIN: 10 INJECTION, EMULSION INTRAVENOUS at 07:31

## 2024-05-16 RX ADMIN — CLINDAMYCIN IN 5 PERCENT DEXTROSE 900 MG: 18 INJECTION, SOLUTION INTRAVENOUS at 07:30

## 2024-05-16 RX ADMIN — SODIUM CHLORIDE, SODIUM LACTATE, POTASSIUM CHLORIDE, AND CALCIUM CHLORIDE 125 ML/HR: .6; .31; .03; .02 INJECTION, SOLUTION INTRAVENOUS at 05:45

## 2024-05-16 RX ADMIN — FENTANYL CITRATE 25 MCG: 50 INJECTION, SOLUTION INTRAMUSCULAR; INTRAVENOUS at 07:47

## 2024-05-16 RX ADMIN — ACETAMINOPHEN 975 MG: 325 TABLET, FILM COATED ORAL at 05:37

## 2024-05-16 RX ADMIN — PROPOFOL 80 MG: 10 INJECTION, EMULSION INTRAVENOUS at 07:30

## 2024-05-16 RX ADMIN — GENTAMICIN SULFATE 112.4 MG: 40 INJECTION, SOLUTION INTRAMUSCULAR; INTRAVENOUS at 07:14

## 2024-05-16 RX ADMIN — FENTANYL CITRATE 25 MCG: 50 INJECTION, SOLUTION INTRAMUSCULAR; INTRAVENOUS at 07:30

## 2024-05-16 NOTE — ANESTHESIA PREPROCEDURE EVALUATION
Procedure:  INSERTION PV SLING (FEMALE)  & EUA (Vagina )    Relevant Problems   ANESTHESIA   (+) PONV (postoperative nausea and vomiting) (In distant past, recent anesthetics has done well)      CARDIO   (+) Hypertension   (+) Mixed hyperlipidemia      GI/HEPATIC   (+) Gastroesophageal reflux disease   (+) Hiatal hernia   (+) Oropharyngeal dysphagia      MUSCULOSKELETAL   (+) Hiatal hernia      NEURO/PSYCH   (+) Anxiety      Other   (+) Adult BMI 29.0-29.9 kg/sq m   (+) Elevated LFTs (Resolved)        Physical Exam    Airway    Mallampati score: II         Dental   No notable dental hx     Cardiovascular  Rhythm: regular, Rate: normal    Pulmonary   Breath sounds clear to auscultation    Other Findings        Anesthesia Plan  ASA Score- 2     Anesthesia Type- IV sedation with anesthesia with ASA Monitors.         Additional Monitors:     Airway Plan:     Comment: TIVA, GA PRN.       Plan Factors-Exercise tolerance (METS): >4 METS.    Chart reviewed.   Existing labs reviewed.     Patient is not a current smoker.      Obstructive sleep apnea risk education given perioperatively.        Induction- intravenous.    Postoperative Plan-         Informed Consent- Anesthetic plan and risks discussed with patient.

## 2024-05-16 NOTE — OP NOTE
OPERATIVE REPORT  PATIENT NAME: Dinorah Horta    :  1970  MRN: [id number]  Pt Location: [facility] OR ROOM 07    SURGERY DATE: 2024    Surgeons and Role:     * Tim Grant, DO - Primary     * Rachel Nunn, DO - Assisting    Preop Diagnosis:  Female stress incontinence [N39.3]    Post-Op Diagnosis Codes:     * Female stress incontinence [N39.3]    Procedure(s):  INSERTION PV SLING (FEMALE)  & EUA. CYSTOSCOPY    Estimated Blood Loss:   Minimal    Anesthesia Type:   IV Sedation with Anesthesia    Operative Indications:  Female stress incontinence [N39.3]    Operative Findings:  1.  External genitalia grossly normal in appearance.  No ulcerations, no lacerations, no lesions.  2.  Vagina was grossly normal in appearance without any lacerations or lesions.     Complications:   None apparent    Procedure and Technique:  The patient was properly identified in the holding area by the operating room staff and attending physician. She was taken to the operating room where general anesthesia was instituted without complication. She was placed in the dorsal lithotomy position with her legs in stirrups with care taken to avoid excessive flexion or extension of her lower extremities. Time-out was taken. The patient was again properly identified by the operating room staff and operating physician. At this time, the patient was prepped and draped in normal sterile fashion. We inserted the Pemberton catheter under sterile conditions for intermittent bladder drainage.   We turned our attention for performance of the single incision sling. At this time, the mid urethral zone was identified in reference to the Pemberton catheter and urethral meatus and local anesthetic solution was injected into the anterior vaginal wall at the mid urethral level for hydrodissection and vasoconstriction. Next, 10 mL was injected at the midline. An additional 7 mL were injected to the left and right of midline directing the infiltration laterally towards  the cephalad aspect of the inferior pubic ramus bilaterally. Care was taken to ensure that the sulcus was flattened and free of infiltration to minimize chance for buttonholing or tapping too close to the anterolateral sulcus. After completion of hydrodissection, 2 Allis clamps were used to grasp the anterior vaginal wall for traction. A 1.5 cm incision was made to the midline. Two Allis clamps were then placed on each cut edge of the incision for stabilization. Tenotomy scissors were used to create a small vaginal tunnel with sharp and blunt dissection above the anterior vaginal wall directed laterally towards the cephalad aspect of the inferior pubic ramus bilaterally. Dissection was carried out to the edge of the bone itself but no dissection into the obturator internus muscle. Once the dissection was complete, the sling was placed. The Altis system was used. An index finger was placed in the vagina for guidance. The Altis trocar was placed into the pre-dissected tract. The handle was held in horizontal slight upward canting to avoid buttonholing of the sulcus. Cephalad drift was used to allow passage around the inferior pubic ramus. A thumb was placed on the heel of the introducer to allow a push/pivot maneuver to place a fixed anchor into the obturator internus muscle membrane complex on the patient's left side. Proper handle deviation confirmed proper anchor placement, as well as a gentle tugging on the sling. Once the introducer was removed, it also confirmed proper anchor placement. The exact same sequence of steps was repeated on the patient's right side with adjustable anchor. Once it was complete, the introducer was removed and gentle tugging again confirmed proper anchor placement. The Pemberton catheter was then used to drain the bladder and then it was removed and a diagnostic cystoscopy was performed by instilling 300 mL of fluid into the bladder. Both ureters were effluxing normally and there were no  lacerations in the lower urinary tract. We used Crede maneuver to elicit loss of fluid from the urethral meatus and there was loss of fluid noted. The tensioning suture was used to adjust the tape until there was minimal to no leakage with Crede. After appropriate tensioning, the tensioning suture was cut and the vaginal incision was closed with 3-0 Vicryl suture in a running nonlocked stitch. At this time, we placed the Pemberton back in the bladder to drain and it was then removed.     Dr. Grant was present for the entire procedure. The sponge, needle and instrument count were correct x2. The patient tolerated the procedure well and went to the recovery room in stable condition and she is stable at the moment of this dictation.    I was present for the entire procedure.    Patient Disposition:  PACU         SIGNATURE: Rachel Nunn DO  DATE: May 16, 2024  TIME: 8:11 AM

## 2024-05-16 NOTE — ANESTHESIA POSTPROCEDURE EVALUATION
Post-Op Assessment Note    CV Status:  Stable    Pain management: adequate       Mental Status:  Alert and awake   Hydration Status:  Euvolemic   PONV Controlled:  Controlled   Airway Patency:  Patent  Two or more mitigation strategies used for obstructive sleep apnea   Post Op Vitals Reviewed: Yes    No anethesia notable event occurred.    Staff: Anesthesiologist           /59   Pulse 61   Temp 97.8 °F (36.6 °C) (Temporal)   Resp 16   Wt 90.4 kg (199 lb 4.7 oz)   SpO2 96%   BMI 30.30 kg/m²       BP      Temp      Pulse     Resp      SpO2

## 2024-05-16 NOTE — DISCHARGE INSTR - AVS FIRST PAGE
Urethral Sling Procedure   WHAT YOU NEED TO KNOW:   A bladder sling procedure is surgery to treat urinary incontinence in women. The sling acts as a hammock to keep your urethra in place and hold it closed when your bladder is full. You may have vaginal bleeding or discharge for up to a week after your surgery. Use sanitary pads. Do not use tampons. You may have some pelvic discomfort or trouble urinating.   DISCHARGE INSTRUCTIONS:   Call 911 for any of the following:   You have sudden trouble breathing.    Seek care immediately if:   Your bleeding gets worse.    You have yellow or foul smelling discharge from your vagina.    You cannot urinate, or you are urinating less than what is normal for you.    You feel confused.  Contact your healthcare provider if:   You have a fever.    You do not feel like you are able to empty your bladder completely when you urinate.    You feel the need to urinate very suddenly.    You have burning or stinging when you urinate.    You have blood in your urine.    Your skin is itchy, swollen, or you have a rash.    You have questions or concerns about your condition or care.  Medicines:   Prescription pain medicine  may be given. Ask your how to take this medicine safely.    Take your medicine as directed.  Contact your healthcare provider if you think your medicine is not helping or if you have side effects. Tell him or her if you are allergic to any medicine. Keep a list of the medicines, vitamins, and herbs you take. Include the amounts, and when and why you take them. Bring the list or the pill bottles to follow-up visits. Carry your medicine list with you in case of an emergency.  Self-catheterization:  You may need to put a catheter into your bladder after you urinate to empty any remaining urine. A catheter is a small rubber tube used to drain urine. Healthcare providers will teach you how to put the catheter in safely. This may be needed until you are completely emptying your  bladder.  Pemberton catheter:  You may have a Pemberton catheter for a short period of time. The Pemberton is a tube put into your bladder to drain urine into a bag. Keep the bag below your waist. This will prevent urine from flowing back into your bladder and causing an infection or other problems. Also, keep the tube free of kinks so the urine will drain properly. Do not pull on the catheter. This can cause pain and bleeding, and may cause the catheter to come out.   Activity:  Do not lift heavy objects for 6 weeks after your procedure. Do not have intercourse for 4 to 6 weeks. Do not use a tampon for 4 weeks. Ask your healthcare provider when you can return to work or your usual activities.  Do pelvic muscle exercises:  These are also called Kegel exercises. These exercises help strengthen your pelvic muscles and help prevent urine leakage. Tighten the muscles of your pelvis and hold them tight for 5 seconds, then relax for 5 seconds. Gradually work up to tightening them for 10 seconds and relaxing for 10 seconds. Do this 3 times each day.  Keep a record:  Keep a record of when you urinate and if you leak any urine. Write down what you were doing when you leaked urine, such as coughing or sneezing. Bring the log to your follow-up visits.  Prevent constipation:  Drink liquids as directed. You may need to drink more water than usual to soften your bowel movements. Eat a variety of healthy foods, especially fruit and foods high in fiber. You may need to use an over-the-counter bowel movement softener.  Follow up with your healthcare provider as directed:  You may need a test to check how much urine remains in your bladder after you urinate. This will help show how the sling is working. Write down your questions so you remember to ask them during your visits.  © 2017 [employer] Information is for End User's use only and may not be sold, redistributed or otherwise used for commercial purposes. All illustrations  and images included in CareNotes® are the copyrighted property of [employer], [employer] or [employer].  The above information is an  only. It is not intended as medical advice for individual conditions or treatments. Talk to your doctor, nurse or pharmacist before following any medical regimen to see if it is safe and effective for you.

## 2024-05-29 ENCOUNTER — OFFICE VISIT (OUTPATIENT)
Dept: GYNECOLOGY | Facility: CLINIC | Age: 54
End: 2024-05-29

## 2024-05-29 VITALS
DIASTOLIC BLOOD PRESSURE: 60 MMHG | SYSTOLIC BLOOD PRESSURE: 108 MMHG | WEIGHT: 199 LBS | HEART RATE: 76 BPM | BODY MASS INDEX: 30.16 KG/M2 | HEIGHT: 68 IN

## 2024-05-29 DIAGNOSIS — Z48.89 POSTOPERATIVE VISIT: Primary | ICD-10-CM

## 2024-05-29 PROCEDURE — 99024 POSTOP FOLLOW-UP VISIT: CPT | Performed by: OBSTETRICS & GYNECOLOGY

## 2024-05-29 NOTE — PROGRESS NOTES
Patient presents for postoperative check.  She is status post mid urethral sling on May 16.  She is doing well since surgery with no complaints.    Physical exam: Incision line is healing well.  No evidence of hematoma or abscess.  No tenderness.  Abdomen is benign.    Impression: Normal postoperative check    Plan: Return to the office as needed/annual

## 2024-06-04 ENCOUNTER — OFFICE VISIT (OUTPATIENT)
Dept: GYNECOLOGY | Facility: CLINIC | Age: 54
End: 2024-06-04
Payer: COMMERCIAL

## 2024-06-04 ENCOUNTER — PATIENT MESSAGE (OUTPATIENT)
Dept: GYNECOLOGY | Facility: CLINIC | Age: 54
End: 2024-06-04

## 2024-06-04 ENCOUNTER — TELEPHONE (OUTPATIENT)
Age: 54
End: 2024-06-04

## 2024-06-04 VITALS
DIASTOLIC BLOOD PRESSURE: 71 MMHG | SYSTOLIC BLOOD PRESSURE: 120 MMHG | BODY MASS INDEX: 30.16 KG/M2 | HEIGHT: 68 IN | WEIGHT: 199 LBS

## 2024-06-04 DIAGNOSIS — N76.4 VULVAR BOIL: Primary | ICD-10-CM

## 2024-06-04 PROCEDURE — 99213 OFFICE O/P EST LOW 20 MIN: CPT | Performed by: OBSTETRICS & GYNECOLOGY

## 2024-06-04 RX ORDER — AMOXICILLIN 500 MG/1
500 CAPSULE ORAL EVERY 12 HOURS SCHEDULED
Qty: 20 CAPSULE | Refills: 0 | Status: SHIPPED | OUTPATIENT
Start: 2024-06-04 | End: 2024-06-14

## 2024-06-04 NOTE — TELEPHONE ENCOUNTER
Patient called c/o pea size vulvar lump, painful, not draining, more than 1 week.  Has had this before.  Patient wants it to be checked.  Appointment given for today since patient lives in [place] and wants that office.

## 2024-06-05 NOTE — PROGRESS NOTES
"Assessment/Plan:    Will treat with amoxicillin. Pt is allergic to keflex, but has done well with amoxicillin in the past. Pt instructed to notify the office if sx persist or worsen.     Diagnoses and all orders for this visit:    Vulvar boil  -     amoxicillin (AMOXIL) 500 mg capsule; Take 1 capsule (500 mg total) by mouth every 12 (twelve) hours for 10 days        Subjective:      Patient ID: Dinorah Horta is a 53 y.o. female.    Pt presents with vulvar lump, tender for about a week.   Denies associated vaginal discharge, irritation.         The following portions of the patient's history were reviewed and updated as appropriate: allergies, current medications, past family history, past medical history, past social history, past surgical history and problem list.    Review of Systems   Constitutional: Negative.    Respiratory: Negative.     Cardiovascular: Negative.    Gastrointestinal: Negative.    Endocrine: Negative.    Genitourinary:  Positive for genital sores. Negative for dyspareunia, dysuria, frequency, pelvic pain, urgency, vaginal bleeding, vaginal discharge and vaginal pain.   Musculoskeletal: Negative.    Skin: Negative.    Neurological: Negative.    Psychiatric/Behavioral: Negative.           Objective:      /71   Ht 5' 8\" (1.727 m)   Wt 90.3 kg (199 lb)   BMI 30.26 kg/m²          Physical Exam  Vitals and nursing note reviewed. Exam conducted with a chaperone present.   Constitutional:       Appearance: Normal appearance.   HENT:      Head: Normocephalic and atraumatic.   Pulmonary:      Effort: Pulmonary effort is normal.   Genitourinary:     Labia:         Right: Tenderness and lesion (pea-sized vulvar pustule, with minimal exudate noted with expression) present. No rash or injury.         Left: No rash, tenderness, lesion or injury.       Urethra: No urethral pain, urethral swelling or urethral lesion.       Musculoskeletal:         General: Normal range of motion.      Cervical back: " Normal range of motion.   Skin:     General: Skin is warm and dry.   Neurological:      Mental Status: She is alert and oriented to person, place, and time.   Psychiatric:         Mood and Affect: Mood normal.         Behavior: Behavior normal.         Thought Content: Thought content normal.         Judgment: Judgment normal.

## 2024-06-18 ENCOUNTER — DOCUMENTATION (OUTPATIENT)
Dept: GENETICS | Facility: CLINIC | Age: 54
End: 2024-06-18

## 2024-06-20 ENCOUNTER — TELEMEDICINE (OUTPATIENT)
Dept: GENETICS | Facility: CLINIC | Age: 54
End: 2024-06-20

## 2024-06-20 ENCOUNTER — TELEPHONE (OUTPATIENT)
Dept: HEMATOLOGY ONCOLOGY | Facility: CLINIC | Age: 54
End: 2024-06-20

## 2024-06-20 DIAGNOSIS — Z91.89 AT HIGH RISK FOR BREAST CANCER: ICD-10-CM

## 2024-06-20 DIAGNOSIS — Z80.3 FAMILY HISTORY OF BREAST CANCER IN SISTER: ICD-10-CM

## 2024-06-20 NOTE — PROGRESS NOTES
Pre-Test Genetic Counseling Consult Note    Patient Name: Dinorah Horta   /Age: 1970/53 y.o.  Referring Provider: TRINITY Erazo     Date of Service: 2024  Genetic Counselor: Rosario Cobian MS, [facility]  Interpretation Services: None  Location: In-person consult at [place]  Length of Visit: 60 Minutes    Dinorah was referred to the [facility] Cancer Risk and Genetic Assessment Program due to her family history of breast and prostate cancer among others . she presents today to discuss the possibility of a hereditary cancer syndrome, options for genetic testing, and implications for her and her family.     Cancer History and Treatment:     Personal History: No personal history of cancer     Screening Hx:   Breast:  Breast Imaging: Annual mammogram (most recent 24); it was recommended by Maribel that Dinorah have annual mammogram and breast MRI  Breast Biopsy: 3 prior breast biopsies, all benign   Breast Density: Heterogeneously dense    Colon:  Colonoscopy & EGD: Most recent 23; recommended to repeat in 10 years     Gynecologic:  Ovaries intact, uterus and cervix removed d/t heavy bleeding    Skin:  No current screening    Reproductive History  Age at menarche: 10  Age at first live birth:  22  Menopause: Perimenopausal  Hormone replacement: HRT less than 1 year    Medical and Surgical History  Pertinent surgical history:   Past Surgical History:   Procedure Laterality Date    BREAST BIOPSY Left     core bx; benign     BREAST BIOPSY Right 2022    US guided biopsy    CHOLECYSTECTOMY      EPIDURAL BLOCK INJECTION Right 2021    Procedure: L4 and L5 TRANSFORAMINAL EPIDURAL STEROID INJECTION;  Surgeon: Gustavo Bowen MD;  Location: [facility];  Service: Pain Management     EPIDURAL BLOCK INJECTION N/A 2021    Procedure: BLOCK / INJECTION EPIDURAL STEROID LUMBAR L4-L5;  Surgeon: Gustavo Bowen MD;  [facility]: [facility];  Service: Pain Management     FL GUIDED NEEDLE PLAC BX/ASP/INJ   "03/03/2022    FL GUIDED NEEDLE PLAC BX/ASP/INJ  04/14/2022    HYSTERECTOMY      age 33    MRI BREAST BIOPSY RIGHT (ALL INCLUSIVE) Right 12/29/2023    NERVE BLOCK Bilateral 02/15/2022    Procedure: BLOCK MEDIAL BRANCH L3, L4, L5 #1;  Surgeon: Gustavo Bowen MD;  [facility]: [facility];  Service: Pain Management     NERVE BLOCK Bilateral 03/03/2022    Procedure: BLOCK MEDIAL BRANCH L3, L4, L5 #2;  Surgeon: Gustavo Bowen MD;  [facility]: [facility];  Service: Pain Management     LA SLING OPERATION STRESS INCONTINENCE N/A 5/16/2024    Procedure: INSERTION PV SLING (FEMALE)  & EUA, CYSTOSCOPY;  Surgeon: Tim Grant DO;  Location: [facility];  Service: Gynecology    RHIZOTOMY Bilateral 04/14/2022    Procedure: RHIZOTOMY LUMBAR L3, L4, L5;  Surgeon: Gustavo Bowen MD;  [facility]: [facility];  Service: Pain Management     RHIZOTOMY Bilateral 11/15/2022    Procedure: RHIZOTOMY LUMBAR L3, L4, L5;  Surgeon: Gustavo Bowen MD;  Location: [facility];  Service: Pain Management     TOOTH EXTRACTION      US GUIDED BREAST BIOPSY RIGHT COMPLETE Right 05/18/2022      Pertinent medical history:  Past Medical History:   Diagnosis Date    Arthritis     Chronic pain disorder     Diverticulosis     GERD (gastroesophageal reflux disease)     Hyperlipidemia     Hypertension     Lumbar herniated disc     Menopause ovarian failure     PONV (postoperative nausea and vomiting)        Other History:  Height:   Ht Readings from Last 1 Encounters:   06/04/24 5' 8\" (1.727 m)     Weight:   Wt Readings from Last 1 Encounters:   06/04/24 90.3 kg (199 lb)     Relevant Family History   Patient reports no Ashkenazi [employer] ancestry.         Please refer to the scanned pedigree in the Media Tab for a complete family history     *All history is reported as provided by the patient; records are not available for review, except where indicated.     Assessment:  We discussed sporadic, familial and hereditary cancer.  We also discussed the many factors that " influence our risk for cancer such as age, environmental exposures, lifestyle choices and family history.      We reviewed the indications suggestive of a hereditary predisposition to cancer.    Dinorah is unaffected, but is considering genetic testing due to a family history of breast cancer. Although Dinorah's  sister and maternal cousin are  the most informative relatives, Dinorah can consider genetic testing as her sister and cousin have no pursued testing to this point.       Genetic testing is indicated for Dinorah based on the following criteria:       We discussed that  genetic testing is available for some rare genes associated with a hereditary risk for MDS/leukemia however Dinorah's father and paternal uncle are both in the same generation and were diagnosed later in their life.  For families with this genetic risk we see multiple relatives, across multiple generations diagnosed at young ages.  Additionally, for many of the genes associated with a hereditary risk for MDS/leukemia, there is no screening or prevention readily available.  For these reasons, we did not include these genes on Dinorah's test today.  If there are additional family members diagnosed with MDS/leukemia in the future, it may be worth while offering them testing at that time.        The risks, benefits, and limitations of genetic testing were reviewed with the patient, as well as genetic discrimination laws, and possible test results (positive, negative, variants of uncertain significance) and their clinical implications. If positive for a mutation, options for managing cancer risk including increased surveillance, chemoprevention, and in some cases prophylactic surgery were discussed. Dinorah was informed that if a hereditary cancer syndrome was identified in her, first degree relatives (parents, siblings, and children) have a chance of also inheriting the condition. Genetic testing would allow for predictive genetic testing in other relatives, who may also be  at risk depending on their degree of relation.     Billing:  Most individuals pay <$100 for hereditary cancer genetic testing. If insurance covers the cost of the testing, individuals may still pay out of pocket secondary to co-pays, co-insurance, or deductibles. If the cost of the testing exceeds $100, the lab will reach out to the patient via phone or e-mail. The patient will then have the option to proceed with the testing, cancel the testing, or elect the self-pay option of $250. Dinorah verbalized understanding.     Plan: Patient decided to proceed with testing and provided consent.    Summary:     Sample Collection:  A blood kit will be mailed out to Dinorah on 6/28/24 per her request      Genetic Testing Preformed: [employer]: Cancer® +RNAinsight® (59 genes): APC, AFTAB, AXIN2, BAP1, BARD1, BMPR1A, BRCA1, BRCA2, BRIP1, CDH1, CDK4, CDKN1B, CDKN2A, CHEK2, CTNNA1, DICER1, EGLN1, EPCAM, FH, FLCN, GREM1, HOXB13, KIF1B, KIT, MAX, MEN1, MET, MITF, MLH1, MSH2, MSH3, MSH6, MUTYH, NF1, NTHL1, PALB2, PDGFRA PMS2, POLD1, POLE, POT1, PTEN, RAD51C, RAD51D, RB1, RET, SDHA, SDHAF2, SDHB, SDHC, SDHD, SMAD4, SMARCA4, STK11, WJGV524, TP53, TSC1, TSC2, VHL      Result Call Information:  In the event that we need to reach Dinorah via telephone:  I confirmed the patient's mobile number on file as the best number to call with results  I confirmed with the patient that we can leave a voicemail on the provided numbers    Results take approximately 2-3 weeks to complete once test is started.    Dinorah will be notified via [employer] once results are available.      Additional recommendations for surveillance/medical management will be made pending genetic test results.

## 2024-06-20 NOTE — TELEPHONE ENCOUNTER
Patient Call    Who are you speaking with? Patient    If it is not the patient, are they listed on an active communication consent form? N/A   What is the reason for this call? Patient states he received a text for a survey for her genetics visit today, she did the survey and it stated she was not needing it to be done. She is wondering if she needing to still come in today    Does this require a call back? Yes   If a call back is required, please list best call back number [phone]   If a call back is required, advise that a message will be forwarded to their care team and someone will return their call as soon as possible.   Did you relay this information to the patient? Yes

## 2024-06-28 ENCOUNTER — DOCUMENTATION (OUTPATIENT)
Dept: GENETICS | Facility: CLINIC | Age: 54
End: 2024-06-28

## 2024-07-24 DIAGNOSIS — F41.8 ANXIETY ASSOCIATED WITH DEPRESSION: ICD-10-CM

## 2024-07-24 RX ORDER — DESVENLAFAXINE SUCCINATE 50 MG/1
TABLET, EXTENDED RELEASE ORAL
Qty: 100 TABLET | Refills: 1 | Status: SHIPPED | OUTPATIENT
Start: 2024-07-24

## 2024-07-26 ENCOUNTER — APPOINTMENT (OUTPATIENT)
Dept: LAB | Facility: MEDICAL CENTER | Age: 54
End: 2024-07-26
Payer: COMMERCIAL

## 2024-07-26 ENCOUNTER — TELEPHONE (OUTPATIENT)
Dept: GENETICS | Facility: CLINIC | Age: 54
End: 2024-07-26

## 2024-07-26 ENCOUNTER — TELEPHONE (OUTPATIENT)
Age: 54
End: 2024-07-26

## 2024-07-26 DIAGNOSIS — Z80.3 FAMILY HISTORY OF BREAST CANCER: ICD-10-CM

## 2024-07-26 PROCEDURE — 36415 COLL VENOUS BLD VENIPUNCTURE: CPT

## 2024-07-26 NOTE — TELEPHONE ENCOUNTER
Dinorah Ross brought a genetics kit to [facility]'s lab for a blood draw but did not bring required paperwork.     Faxed her TRF and genetics cover sheet to [phone]

## 2024-07-26 NOTE — TELEPHONE ENCOUNTER
Patient calling [phone]     HIPAA verified.    [employer] kit received by patient.    At  lab in [place] - may be missing a script to draw blood.    Please advise.

## 2024-08-19 DIAGNOSIS — I10 ESSENTIAL HYPERTENSION: ICD-10-CM

## 2024-08-19 DIAGNOSIS — K21.9 GASTROESOPHAGEAL REFLUX DISEASE WITHOUT ESOPHAGITIS: ICD-10-CM

## 2024-08-19 RX ORDER — SPIRONOLACTONE 25 MG/1
TABLET ORAL
Qty: 90 TABLET | Refills: 1 | Status: SHIPPED | OUTPATIENT
Start: 2024-08-19

## 2024-08-19 RX ORDER — PANTOPRAZOLE SODIUM 40 MG/1
40 TABLET, DELAYED RELEASE ORAL DAILY
Qty: 90 TABLET | Refills: 1 | Status: SHIPPED | OUTPATIENT
Start: 2024-08-19

## 2024-08-27 ENCOUNTER — OFFICE VISIT (OUTPATIENT)
Dept: FAMILY MEDICINE CLINIC | Facility: CLINIC | Age: 54
End: 2024-08-27
Payer: COMMERCIAL

## 2024-08-27 VITALS
HEART RATE: 88 BPM | OXYGEN SATURATION: 98 % | DIASTOLIC BLOOD PRESSURE: 90 MMHG | SYSTOLIC BLOOD PRESSURE: 162 MMHG | WEIGHT: 208 LBS | HEIGHT: 68 IN | TEMPERATURE: 96.7 F | BODY MASS INDEX: 31.52 KG/M2

## 2024-08-27 DIAGNOSIS — H70.92 MASTOIDITIS OF LEFT SIDE: Primary | ICD-10-CM

## 2024-08-27 PROCEDURE — 99213 OFFICE O/P EST LOW 20 MIN: CPT | Performed by: FAMILY MEDICINE

## 2024-08-27 RX ORDER — AZITHROMYCIN 250 MG/1
TABLET, FILM COATED ORAL
Qty: 6 TABLET | Refills: 0 | Status: SHIPPED | OUTPATIENT
Start: 2024-08-27 | End: 2024-08-31

## 2024-08-27 NOTE — PROGRESS NOTES
"Ambulatory Visit  Name: Dinorah Horta      : 1970      MRN: [id number]  Encounter Provider: Usman Mckeon DO  Encounter Date: 2024   Encounter department: [facility]    Assessment & Plan   1. Mastoiditis of left side       History of Present Illness     Mrs. Had a stay here with pain over the left mastoid area of her skull does have some sinus congestion wondering if she may have a mastoiditis or a sympathetic mastoiditis tender to the touch no rash no sign of any shingles has vesicles sinuses are hyperemic        Review of Systems   Constitutional:  Negative for fatigue and fever.   HENT:  Positive for sinus pressure. Negative for dental problem and hearing loss.    Eyes:  Negative for visual disturbance.   Musculoskeletal:  Negative for arthralgias.       Objective     /90 (BP Location: Left arm, Patient Position: Sitting, Cuff Size: Standard)   Pulse 88   Temp (!) 96.7 °F (35.9 °C) (Temporal)   Ht 5' 8\" (1.727 m)   Wt 94.3 kg (208 lb)   SpO2 98%   BMI 31.63 kg/m²     Physical Exam  Constitutional:       Appearance: She is well-developed.   HENT:      Head: Normocephalic and atraumatic.      Right Ear: External ear normal.      Left Ear: External ear normal.      Nose: Nose normal.   Eyes:      Conjunctiva/sclera: Conjunctivae normal.      Pupils: Pupils are equal, round, and reactive to light.   Cardiovascular:      Rate and Rhythm: Normal rate and regular rhythm.      Heart sounds: Normal heart sounds. No murmur heard.     No friction rub.   Pulmonary:      Effort: Pulmonary effort is normal. No respiratory distress.      Breath sounds: Normal breath sounds. No wheezing or rales.   Chest:      Chest wall: No tenderness.   Abdominal:      General: Bowel sounds are normal.      Palpations: Abdomen is soft.   Musculoskeletal:         General: Normal range of motion.      Cervical back: Normal range of motion and neck supple.   Skin:     General: Skin is warm and dry.      " Capillary Refill: Capillary refill takes 2 to 3 seconds.   Neurological:      Mental Status: She is alert and oriented to person, place, and time.   Psychiatric:         Behavior: Behavior normal.         Thought Content: Thought content normal.         Judgment: Judgment normal.       Administrative Statements

## 2024-08-30 ENCOUNTER — TELEPHONE (OUTPATIENT)
Age: 54
End: 2024-08-30

## 2024-08-30 NOTE — TELEPHONE ENCOUNTER
Pt called back stating she has not heard anything back yet since this morning.  Pt informed doctor is seeing patients and has not had the opportunity to answer message yet. Pt aware someone will contact her back.

## 2024-08-30 NOTE — TELEPHONE ENCOUNTER
Pt reports her pain on the left side that PCP treated is still painful and she is about to finish the course of her z-pack.     Pt is concerned since we are going into a holiday weekend.    Triage Nurse offered pt another appt, and pt requests if PCP can call pt in something else to help. Please call pt back @ Phone: [phone].

## 2024-09-03 ENCOUNTER — OFFICE VISIT (OUTPATIENT)
Dept: FAMILY MEDICINE CLINIC | Facility: CLINIC | Age: 54
End: 2024-09-03
Payer: COMMERCIAL

## 2024-09-03 ENCOUNTER — LAB (OUTPATIENT)
Dept: LAB | Facility: HOSPITAL | Age: 54
End: 2024-09-03
Payer: COMMERCIAL

## 2024-09-03 ENCOUNTER — HOSPITAL ENCOUNTER (OUTPATIENT)
Dept: CT IMAGING | Facility: HOSPITAL | Age: 54
Discharge: HOME | End: 2024-09-03
Payer: COMMERCIAL

## 2024-09-03 VITALS
RESPIRATION RATE: 16 BRPM | TEMPERATURE: 97.4 F | BODY MASS INDEX: 31.52 KG/M2 | HEIGHT: 68 IN | DIASTOLIC BLOOD PRESSURE: 86 MMHG | SYSTOLIC BLOOD PRESSURE: 132 MMHG | OXYGEN SATURATION: 100 % | HEART RATE: 83 BPM | WEIGHT: 208 LBS

## 2024-09-03 DIAGNOSIS — R51.9 POSTAURICULAR PAIN: ICD-10-CM

## 2024-09-03 DIAGNOSIS — R22.0 LOCALIZED SWELLING, MASS AND LUMP, HEAD: ICD-10-CM

## 2024-09-03 DIAGNOSIS — L02.811 ABSCESS OF POSTAURICULAR REGION: Primary | ICD-10-CM

## 2024-09-03 DIAGNOSIS — R22.0 LOCALIZED SWELLING, MASS AND LUMP, HEAD: Primary | ICD-10-CM

## 2024-09-03 LAB
ALBUMIN SERPL BCG-MCNC: 4.9 G/DL (ref 3.5–5)
ALP SERPL-CCNC: 77 U/L (ref 34–104)
ALT SERPL W P-5'-P-CCNC: 32 U/L (ref 7–52)
ANION GAP SERPL CALCULATED.3IONS-SCNC: 9 MMOL/L (ref 4–13)
AST SERPL W P-5'-P-CCNC: 29 U/L (ref 13–39)
BASOPHILS # BLD AUTO: 0.05 THOUSANDS/ÂΜL (ref 0–0.1)
BASOPHILS NFR BLD AUTO: 1 % (ref 0–1)
BILIRUB SERPL-MCNC: 0.79 MG/DL (ref 0.2–1)
BUN SERPL-MCNC: 15 MG/DL (ref 5–25)
CALCIUM SERPL-MCNC: 10.2 MG/DL (ref 8.4–10.2)
CHLORIDE SERPL-SCNC: 104 MMOL/L (ref 96–108)
CO2 SERPL-SCNC: 28 MMOL/L (ref 21–32)
CREAT SERPL-MCNC: 0.68 MG/DL (ref 0.6–1.3)
EOSINOPHIL # BLD AUTO: 0.11 THOUSAND/ÂΜL (ref 0–0.61)
EOSINOPHIL NFR BLD AUTO: 1 % (ref 0–6)
ERYTHROCYTE [DISTWIDTH] IN BLOOD BY AUTOMATED COUNT: 12.7 % (ref 11.6–15.1)
GFR SERPL CREATININE-BSD FRML MDRD: 98 ML/MIN/1.73SQ M
GLUCOSE SERPL-MCNC: 109 MG/DL (ref 65–140)
HCT VFR BLD AUTO: 45.2 % (ref 34.8–46.1)
HGB BLD-MCNC: 14.7 G/DL (ref 11.5–15.4)
IMM GRANULOCYTES # BLD AUTO: 0.01 THOUSAND/UL (ref 0–0.2)
IMM GRANULOCYTES NFR BLD AUTO: 0 % (ref 0–2)
LYMPHOCYTES # BLD AUTO: 1.95 THOUSANDS/ÂΜL (ref 0.6–4.47)
LYMPHOCYTES NFR BLD AUTO: 24 % (ref 14–44)
MCH RBC QN AUTO: 30.1 PG (ref 26.8–34.3)
MCHC RBC AUTO-ENTMCNC: 32.5 G/DL (ref 31.4–37.4)
MCV RBC AUTO: 93 FL (ref 82–98)
MONOCYTES # BLD AUTO: 0.71 THOUSAND/ÂΜL (ref 0.17–1.22)
MONOCYTES NFR BLD AUTO: 9 % (ref 4–12)
NEUTROPHILS # BLD AUTO: 5.36 THOUSANDS/ÂΜL (ref 1.85–7.62)
NEUTS SEG NFR BLD AUTO: 65 % (ref 43–75)
NRBC BLD AUTO-RTO: 0 /100 WBCS
PLATELET # BLD AUTO: 265 THOUSANDS/UL (ref 149–390)
PMV BLD AUTO: 9.6 FL (ref 8.9–12.7)
POTASSIUM SERPL-SCNC: 3.7 MMOL/L (ref 3.5–5.3)
PROT SERPL-MCNC: 7.7 G/DL (ref 6.4–8.4)
RBC # BLD AUTO: 4.88 MILLION/UL (ref 3.81–5.12)
SODIUM SERPL-SCNC: 141 MMOL/L (ref 135–147)
WBC # BLD AUTO: 8.19 THOUSAND/UL (ref 4.31–10.16)

## 2024-09-03 PROCEDURE — 36415 COLL VENOUS BLD VENIPUNCTURE: CPT

## 2024-09-03 PROCEDURE — 70482 CT ORBIT/EAR/FOSSA W/O&W/DYE: CPT

## 2024-09-03 PROCEDURE — 99213 OFFICE O/P EST LOW 20 MIN: CPT | Performed by: PHYSICIAN ASSISTANT

## 2024-09-03 PROCEDURE — 85025 COMPLETE CBC W/AUTO DIFF WBC: CPT

## 2024-09-03 PROCEDURE — 80053 COMPREHEN METABOLIC PANEL: CPT

## 2024-09-03 PROCEDURE — 3725F SCREEN DEPRESSION PERFORMED: CPT | Performed by: PHYSICIAN ASSISTANT

## 2024-09-03 RX ORDER — CLINDAMYCIN HCL 300 MG
300 CAPSULE ORAL 3 TIMES DAILY
Qty: 21 CAPSULE | Refills: 0 | Status: SHIPPED | OUTPATIENT
Start: 2024-09-03 | End: 2024-09-05 | Stop reason: DRUGHIGH

## 2024-09-03 RX ADMIN — IOHEXOL 85 ML: 350 INJECTION, SOLUTION INTRAVENOUS at 15:09

## 2024-09-03 NOTE — PROGRESS NOTES
Ambulatory Visit  Name: Dinorah Horta      : 1970      MRN: [id number]  Encounter Provider: Vandana Gonzales PA-C  Encounter Date: 9/3/2024   Encounter department: [facility]    Assessment & Plan   1. Localized swelling, mass and lump, head  Assessment & Plan:  Evaluate further for mastoiditis vs postauricular lymphadenopathy secondary to psoriatic rash on scalp. Pt did not improve with Zithromax from last week, so will evaluate further with STAT CT for further evaluation. STAT labs ordered as well since will require IV contrast.  Orders:  -     CT orbits/temporal bones/skull base wo w contrast; Future; Expected date: 2024  -     Comprehensive metabolic panel; Future  -     CBC and differential; Future  2. Postauricular pain  -     CT orbits/temporal bones/skull base wo w contrast; Future; Expected date: 2024  -     Comprehensive metabolic panel; Future  -     CBC and differential; Future      Depression Screening and Follow-up Plan: Patient was screened for depression during today's encounter. They screened negative with a PHQ-2 score of 0.      History of Present Illness     Dinorah Ross returns today with persistent pain/swelling behind L ear. Was evaluated last week by Dr. Mckeon who was considering acute mastoiditis, he Rx Zithromax which did not help. In fact, Dinorah Ross believes area enlarged over the weekend but may have gotten a little smaller again. She denies sinus symptoms, denies fevers/chills.   Prior to pain/swelling, she did have a psoriatic flare on her scalp at the base of her neck, however the flare resolved prior to the postauricular symptoms.        Review of Systems   Constitutional:  Negative for chills and fever.   HENT:  Negative for ear pain and sore throat.         Pain/swelling behind L ear   Eyes:  Negative for pain and visual disturbance.   Respiratory:  Negative for cough and shortness of breath.    Cardiovascular:  Negative for chest pain and palpitations.  "  Gastrointestinal:  Negative for abdominal pain and vomiting.   Genitourinary:  Negative for dysuria and hematuria.   Musculoskeletal:  Negative for arthralgias and back pain.   Skin:  Negative for color change and rash.   Neurological:  Negative for seizures and syncope.   All other systems reviewed and are negative.      Objective     /86 (BP Location: Left arm, Patient Position: Sitting, Cuff Size: Standard)   Pulse 83   Temp (!) 97.4 °F (36.3 °C) (Temporal)   Resp 16   Ht 5' 8\" (1.727 m)   Wt 94.3 kg (208 lb)   SpO2 100%   BMI 31.63 kg/m²     Physical Exam  Vitals reviewed.   Constitutional:       General: She is not in acute distress.     Appearance: She is well-developed. She is not diaphoretic.   HENT:      Head: Normocephalic and atraumatic.        Right Ear: Hearing, tympanic membrane, ear canal and external ear normal.      Left Ear: Hearing, tympanic membrane, ear canal and external ear normal.      Nose: Nose normal.      Mouth/Throat:      Mouth: Mucous membranes are moist.      Pharynx: Oropharynx is clear. Uvula midline. No oropharyngeal exudate.   Eyes:      General: No scleral icterus.        Right eye: No discharge.         Left eye: No discharge.      Conjunctiva/sclera: Conjunctivae normal.   Neck:      Thyroid: No thyromegaly.      Vascular: No carotid bruit.   Cardiovascular:      Rate and Rhythm: Normal rate and regular rhythm.      Heart sounds: Normal heart sounds. No murmur heard.  Pulmonary:      Effort: Pulmonary effort is normal. No respiratory distress.      Breath sounds: Normal breath sounds. No wheezing.   Abdominal:      General: Bowel sounds are normal. There is no distension.      Palpations: Abdomen is soft. There is no mass.      Tenderness: There is no abdominal tenderness. There is no guarding or rebound.   Musculoskeletal:         General: No tenderness. Normal range of motion.      Cervical back: Neck supple.   Lymphadenopathy:      Cervical: No cervical " adenopathy.   Skin:     General: Skin is warm and dry.      Findings: No erythema or rash.   Neurological:      Mental Status: She is alert and oriented to person, place, and time.   Psychiatric:         Behavior: Behavior normal.         Thought Content: Thought content normal.         Judgment: Judgment normal.       Administrative Statements

## 2024-09-03 NOTE — ASSESSMENT & PLAN NOTE
Evaluate further for mastoiditis vs postauricular lymphadenopathy secondary to psoriatic rash on scalp. Pt did not improve with Zithromax from last week, so will evaluate further with STAT CT for further evaluation. STAT labs ordered as well since will require IV contrast.

## 2024-09-03 NOTE — TELEPHONE ENCOUNTER
Pt called back today upset because she has not heard back from doctor yet and she called twice on Friday. Pt c/o left ear swelling, a lump behind left ear and pain.      Pt accepted an appointment today, 9/3/24 at 1 pm with CRISTIANO

## 2024-09-04 ENCOUNTER — TELEPHONE (OUTPATIENT)
Age: 54
End: 2024-09-04

## 2024-09-04 ENCOUNTER — TELEPHONE (OUTPATIENT)
Dept: FAMILY MEDICINE CLINIC | Facility: CLINIC | Age: 54
End: 2024-09-04

## 2024-09-04 DIAGNOSIS — R22.0 LOCALIZED SWELLING, MASS AND LUMP, HEAD: ICD-10-CM

## 2024-09-04 DIAGNOSIS — L02.811 ABSCESS OF POSTAURICULAR REGION: Primary | ICD-10-CM

## 2024-09-04 DIAGNOSIS — R51.9 POSTAURICULAR PAIN: ICD-10-CM

## 2024-09-04 DIAGNOSIS — L02.11 NECK ABSCESS: Primary | ICD-10-CM

## 2024-09-04 NOTE — TELEPHONE ENCOUNTER
Pt called this morning stating she is unable to get an appointment with ENT Dr. Tee for a few weeks. Asking for recommendations

## 2024-09-04 NOTE — TELEPHONE ENCOUNTER
LMOM to call office please see other phone call as well   ----- Message from Usman Mckeon DO sent at 9/4/2024  6:16 AM EDT -----  Call patient to notify normal results

## 2024-09-04 NOTE — RESULT ENCOUNTER NOTE
Please call the patient regarding her abnormal result.  Patient does have a collection of inflammation or possible pus in the area of the left mastoid not sure if it actually involves the mastoid patient needs an ear nose and throat consult we will set her up with ear nose and throat and out if she has a personal preference so we can call that office and expedite a consult hopefully this week she does not have a published ENT specialist lets try to get her to Dr. Daron Ramirez in [place] within the next 48 hours with possible mastoiditis he is alert them to the CAT scan report

## 2024-09-04 NOTE — TELEPHONE ENCOUNTER
Pt called to schedule appt from referral. Pt was referred to Dr. Daron Ramirez. Provided pt with information to contact provider to schedule appt.

## 2024-09-04 NOTE — TELEPHONE ENCOUNTER
LMOM to please call office ( there are 2 messages for pt    Pt was told to call and ask for Linda - Please review this message from Vandana Just let her know that out of abundance of caution, Dr. Mckeon would also like her to see ENT. I will place referral. As of now, treatment with clindamycin is not changing.        ----- Message from Usman Mckeon DO sent at 9/4/2024  6:18 AM EDT -----  Please call the patient regarding her abnormal result.  Patient does have a collection of inflammation or possible pus in the area of the left mastoid not sure if it actually involves the mastoid patient needs an ear nose and throat consult we will set her up with ear nose and throat and out if she has a personal preference so we can call that office and expedite a consult hopefully this week she does not have a published ENT specialist lets try to get her to Dr. Daron Ramirez in [place] within the next 48 hours with possible mastoiditis he is alert them to the CAT scan report

## 2024-09-05 ENCOUNTER — TELEPHONE (OUTPATIENT)
Age: 54
End: 2024-09-05

## 2024-09-05 DIAGNOSIS — L02.91 ABSCESS: Primary | ICD-10-CM

## 2024-09-05 RX ORDER — CLINDAMYCIN HCL 300 MG
300 CAPSULE ORAL 4 TIMES DAILY
Qty: 28 CAPSULE | Refills: 0 | Status: SHIPPED | OUTPATIENT
Start: 2024-09-05 | End: 2024-09-12

## 2024-09-05 NOTE — TELEPHONE ENCOUNTER
Patient has and apt to see an ENT next Thursday (9/12). Does PCP want to increase antibiotics. PCP has stated that if Pt didn't get a early enough apt to see an ENThe would give stronger dose of antibiotics. Please advise.

## 2024-09-12 DIAGNOSIS — I10 PRIMARY HYPERTENSION: ICD-10-CM

## 2024-09-12 RX ORDER — METOPROLOL SUCCINATE 50 MG/1
TABLET, EXTENDED RELEASE ORAL
Qty: 100 TABLET | Refills: 1 | Status: SHIPPED | OUTPATIENT
Start: 2024-09-12

## 2024-11-07 ENCOUNTER — HOSPITAL ENCOUNTER (OUTPATIENT)
Dept: MRI IMAGING | Facility: HOSPITAL | Age: 54
End: 2024-11-07
Payer: COMMERCIAL

## 2024-11-07 DIAGNOSIS — Z12.39 BREAST CANCER SCREENING OTHER THAN MAMMOGRAM: ICD-10-CM

## 2024-11-07 DIAGNOSIS — Z91.89 AT HIGH RISK FOR BREAST CANCER: ICD-10-CM

## 2024-11-07 DIAGNOSIS — R92.343 EXTREMELY DENSE TISSUE OF BOTH BREASTS ON MAMMOGRAPHY: ICD-10-CM

## 2024-11-07 PROCEDURE — C8937 CAD BREAST MRI: HCPCS

## 2024-11-07 PROCEDURE — C8908 MRI W/O FOL W/CONT, BREAST,: HCPCS

## 2024-11-07 PROCEDURE — 77049 MRI BREAST C-+ W/CAD BI: CPT

## 2024-11-07 PROCEDURE — G1004 CDSM NDSC: HCPCS

## 2024-11-07 PROCEDURE — A9585 GADOBUTROL INJECTION: HCPCS

## 2024-11-07 RX ORDER — GADOBUTROL 604.72 MG/ML
9 INJECTION INTRAVENOUS
Status: COMPLETED | OUTPATIENT
Start: 2024-11-07 | End: 2024-11-07

## 2024-11-07 RX ADMIN — GADOBUTROL 9 ML: 604.72 INJECTION INTRAVENOUS at 16:05

## 2024-11-11 ENCOUNTER — TELEPHONE (OUTPATIENT)
Dept: HEMATOLOGY ONCOLOGY | Facility: CLINIC | Age: 54
End: 2024-11-11

## 2024-11-11 NOTE — TELEPHONE ENCOUNTER
Patient cancelled her appt. On 11/12/24 with Maribel Hill via [employer]. LM with office phone number for her to call to reschedule.

## 2024-12-27 ENCOUNTER — OFFICE VISIT (OUTPATIENT)
Dept: FAMILY MEDICINE CLINIC | Facility: CLINIC | Age: 54
End: 2024-12-27
Payer: COMMERCIAL

## 2024-12-27 VITALS
HEART RATE: 86 BPM | TEMPERATURE: 99.9 F | HEIGHT: 68 IN | BODY MASS INDEX: 30.77 KG/M2 | OXYGEN SATURATION: 96 % | SYSTOLIC BLOOD PRESSURE: 166 MMHG | WEIGHT: 203 LBS | DIASTOLIC BLOOD PRESSURE: 92 MMHG

## 2024-12-27 DIAGNOSIS — Z03.818 ENCOUNTER FOR OBSERVATION FOR SUSPECTED EXPOSURE TO OTHER BIOLOGICAL AGENTS RULED OUT: ICD-10-CM

## 2024-12-27 DIAGNOSIS — Z23 ENCOUNTER FOR IMMUNIZATION: ICD-10-CM

## 2024-12-27 DIAGNOSIS — J01.01 ACUTE RECURRENT MAXILLARY SINUSITIS: Primary | ICD-10-CM

## 2024-12-27 PROCEDURE — 90471 IMMUNIZATION ADMIN: CPT | Performed by: FAMILY MEDICINE

## 2024-12-27 PROCEDURE — 99213 OFFICE O/P EST LOW 20 MIN: CPT | Performed by: FAMILY MEDICINE

## 2024-12-27 PROCEDURE — 90715 TDAP VACCINE 7 YRS/> IM: CPT | Performed by: FAMILY MEDICINE

## 2024-12-27 PROCEDURE — 87636 SARSCOV2 & INF A&B AMP PRB: CPT | Performed by: FAMILY MEDICINE

## 2024-12-27 RX ORDER — CICLOPIROX 1 G/100ML
SHAMPOO TOPICAL
COMMUNITY

## 2024-12-27 RX ORDER — AZITHROMYCIN 250 MG/1
TABLET, FILM COATED ORAL
Qty: 6 TABLET | Refills: 0 | Status: SHIPPED | OUTPATIENT
Start: 2024-12-27 | End: 2024-12-31

## 2024-12-27 RX ORDER — BETAMETHASONE DIPROPIONATE 0.5 MG/G
LOTION TOPICAL 2 TIMES DAILY
COMMUNITY

## 2024-12-27 NOTE — PROGRESS NOTES
"Name: Dinorah Horta      : 1970      MRN: [id number]  Encounter Provider: Usman Mckeon DO  Encounter Date: 2024   Encounter department: [facility]  :  Assessment & Plan  Acute recurrent maxillary sinusitis  Patient will have a COVID and flu nasal smear done because she is going to be a grandmother in about 2 weeks also we will give her her Tdap vaccine today for pertussis protection because it has been over 7 years since her last Tdap       Encounter for immunization  Tdap given today also advised a flu vaccine but because she is ill we will give her her flu shot after she recovers from her infection              History of Present Illness     URI   This is a new problem. The current episode started in the past 7 days. The problem has been gradually worsening. There has been no fever. The fever has been present for 3 to 4 days. Associated symptoms include coughing, sneezing and a sore throat.     Review of Systems   HENT:  Positive for sinus pressure, sneezing and sore throat. Negative for dental problem and hearing loss.    Respiratory:  Positive for cough.        Objective   /92 (BP Location: Left arm, Patient Position: Sitting, Cuff Size: Large)   Pulse 86   Temp 99.9 °F (37.7 °C) (Temporal)   Ht 5' 8\" (1.727 m)   Wt 92.1 kg (203 lb)   SpO2 96%   BMI 30.87 kg/m²      Physical Exam  Constitutional:       Appearance: She is ill-appearing.   HENT:      Nose: Congestion and rhinorrhea present.   Cardiovascular:      Rate and Rhythm: Normal rate and regular rhythm.   Pulmonary:      Effort: Pulmonary effort is normal.      Breath sounds: Normal breath sounds.   Neurological:      Mental Status: She is alert.         "

## 2024-12-28 LAB
FLUAV RNA RESP QL NAA+PROBE: NEGATIVE
FLUBV RNA RESP QL NAA+PROBE: NEGATIVE
SARS-COV-2 RNA RESP QL NAA+PROBE: NEGATIVE

## 2024-12-30 ENCOUNTER — RESULTS FOLLOW-UP (OUTPATIENT)
Dept: FAMILY MEDICINE CLINIC | Facility: CLINIC | Age: 54
End: 2024-12-30

## 2025-01-10 DIAGNOSIS — Z00.6 ENCOUNTER FOR EXAMINATION FOR NORMAL COMPARISON OR CONTROL IN CLINICAL RESEARCH PROGRAM: ICD-10-CM

## 2025-01-11 ENCOUNTER — APPOINTMENT (OUTPATIENT)
Dept: LAB | Facility: MEDICAL CENTER | Age: 55
End: 2025-01-11

## 2025-01-11 DIAGNOSIS — Z00.6 ENCOUNTER FOR EXAMINATION FOR NORMAL COMPARISON OR CONTROL IN CLINICAL RESEARCH PROGRAM: ICD-10-CM

## 2025-01-11 PROCEDURE — 36415 COLL VENOUS BLD VENIPUNCTURE: CPT

## 2025-01-20 DIAGNOSIS — F41.8 ANXIETY ASSOCIATED WITH DEPRESSION: ICD-10-CM

## 2025-01-20 RX ORDER — DESVENLAFAXINE 50 MG/1
50 TABLET, FILM COATED, EXTENDED RELEASE ORAL DAILY
Qty: 90 TABLET | Refills: 1 | Status: SHIPPED | OUTPATIENT
Start: 2025-01-20

## 2025-01-21 LAB
APOB+LDLR+PCSK9 GENE MUT ANL BLD/T: NOT DETECTED
BRCA1+BRCA2 DEL+DUP + FULL MUT ANL BLD/T: NOT DETECTED
MLH1+MSH2+MSH6+PMS2 GN DEL+DUP+FUL M: NOT DETECTED

## 2025-02-13 DIAGNOSIS — K21.9 GASTROESOPHAGEAL REFLUX DISEASE WITHOUT ESOPHAGITIS: ICD-10-CM

## 2025-02-13 DIAGNOSIS — I10 ESSENTIAL HYPERTENSION: ICD-10-CM

## 2025-02-13 RX ORDER — SPIRONOLACTONE 25 MG/1
25 TABLET ORAL DAILY
Qty: 90 TABLET | Refills: 1 | Status: SHIPPED | OUTPATIENT
Start: 2025-02-13

## 2025-02-13 RX ORDER — PANTOPRAZOLE SODIUM 40 MG/1
40 TABLET, DELAYED RELEASE ORAL DAILY
Qty: 90 TABLET | Refills: 1 | Status: SHIPPED | OUTPATIENT
Start: 2025-02-13

## 2025-03-11 DIAGNOSIS — I10 PRIMARY HYPERTENSION: ICD-10-CM

## 2025-03-11 DIAGNOSIS — E78.2 MIXED HYPERLIPIDEMIA: ICD-10-CM

## 2025-03-11 RX ORDER — METOPROLOL SUCCINATE 50 MG/1
50 TABLET, EXTENDED RELEASE ORAL DAILY
Qty: 90 TABLET | Refills: 1 | Status: SHIPPED | OUTPATIENT
Start: 2025-03-11

## 2025-03-11 RX ORDER — ROSUVASTATIN CALCIUM 10 MG/1
10 TABLET, COATED ORAL DAILY
Qty: 90 TABLET | Refills: 1 | Status: SHIPPED | OUTPATIENT
Start: 2025-03-11

## 2025-03-31 NOTE — PROGRESS NOTES
Name: Dinorah Horta      : 1970      MRN: [id number]  Encounter Provider: TRINITY Trinidad  Encounter Date: 2025   Encounter department: [facility]  :  Assessment & Plan  Encounter for gynecological examination (general) (routine) with abnormal findings  Recommended monthly SBE, annual CBE and annual screening mammo. ASCCP guidelines reviewed and pap with cotesting repeated given hx. Colonoscopy noted to be up to date.  Reviewed diet/activity recommendations Calcium 1200 mg and Vit D 600-1000 IU daily.   Kegel exercises as instructed. RTO in one year for routine annual gyn exam or sooner PRN.           Menopause  Discussed option of [facility]. Pt is agreeable. If LFTs are normal, will send script. Pt advised to go to [email or web address] get a coupon code and call the pharmacy with the code.   Pt made aware that LFTS must be checked monthly for 1st 3 months and then 6 and 9 months.   Orders:    Hepatic function panel; Future    Vasomotor symptoms due to menopause             History of Present Illness   This patient presents for routine annual gyn exam.   Hx of hyst for AUB, patient states she was told pathology was precancerous and was advised to continue paps.   She was started on vivelle dot on 3/7/23 for VM sx impacting QOL. Breast team did not wish for pt to be on hormone. Today she states she is having many hot flashes and night sweats that are disrupting sleep and QOL.  Pt had PVS 24 for ELDON. She is doing well.   She denies vaginal bleeding or spotting,  pelvic pain, dyspareunia, breast concerns, abnormal discharge, bowel dysfunction, depression/anx.   , sexually active and is monogamous.  Pap/HPV up to date and normal, 3/19/24.  WILL REPEAT.  Colonoscopy done 23.           Review of Systems   Constitutional: Negative.    Respiratory: Negative.     Cardiovascular: Negative.    Gastrointestinal: Negative.    Endocrine: Positive for heat intolerance.  "  Genitourinary:  Negative for dysuria, frequency, pelvic pain, urgency, vaginal bleeding, vaginal discharge and vaginal pain.   Musculoskeletal: Negative.    Skin: Negative.    Neurological: Negative.    Psychiatric/Behavioral: Negative.            Objective   /88 (BP Location: Left arm, Patient Position: Sitting, Cuff Size: Large)   Pulse 83   Ht 5' 8\" (1.727 m)   Wt 90.7 kg (200 lb)   BMI 30.41 kg/m²      Physical Exam  Vitals and nursing note reviewed. Exam conducted with a chaperone present.   Constitutional:       General: She is not in acute distress.     Appearance: She is well-developed.   HENT:      Head: Normocephalic and atraumatic.   Eyes:      Conjunctiva/sclera: Conjunctivae normal.   Cardiovascular:      Rate and Rhythm: Normal rate and regular rhythm.      Heart sounds: No murmur heard.  Pulmonary:      Effort: Pulmonary effort is normal. No respiratory distress.      Breath sounds: Normal breath sounds.   Chest:   Breasts:     Right: No swelling, bleeding, inverted nipple, mass, nipple discharge, skin change or tenderness.      Left: No swelling, bleeding, inverted nipple, mass, nipple discharge, skin change or tenderness.   Abdominal:      Palpations: Abdomen is soft.      Tenderness: There is no abdominal tenderness.   Genitourinary:     General: Normal vulva.      Exam position: Supine.      Pubic Area: No rash.       Labia:         Right: No rash, tenderness, lesion or injury.         Left: No rash, tenderness, lesion or injury.       Urethra: No urethral pain, urethral swelling or urethral lesion.      Vagina: No signs of injury and foreign body. No vaginal discharge, erythema, tenderness, bleeding, lesions or prolapsed vaginal walls.      Adnexa:         Right: No mass, tenderness or fullness.          Left: No mass, tenderness or fullness.        Comments: Cervix, uterus absent, no masses or tenderness on BME  Musculoskeletal:         General: No swelling.      Cervical back: Neck " supple.   Skin:     General: Skin is warm and dry.      Capillary Refill: Capillary refill takes less than 2 seconds.   Neurological:      Mental Status: She is alert.   Psychiatric:         Mood and Affect: Mood normal.

## 2025-04-01 ENCOUNTER — ANNUAL EXAM (OUTPATIENT)
Dept: GYNECOLOGY | Facility: CLINIC | Age: 55
End: 2025-04-01
Payer: COMMERCIAL

## 2025-04-01 VITALS
SYSTOLIC BLOOD PRESSURE: 144 MMHG | HEIGHT: 68 IN | DIASTOLIC BLOOD PRESSURE: 88 MMHG | HEART RATE: 83 BPM | WEIGHT: 200 LBS | BODY MASS INDEX: 30.31 KG/M2

## 2025-04-01 DIAGNOSIS — Z78.0 MENOPAUSE: ICD-10-CM

## 2025-04-01 DIAGNOSIS — Z01.411 ENCOUNTER FOR GYNECOLOGICAL EXAMINATION (GENERAL) (ROUTINE) WITH ABNORMAL FINDINGS: Primary | ICD-10-CM

## 2025-04-01 DIAGNOSIS — N95.1 VASOMOTOR SYMPTOMS DUE TO MENOPAUSE: ICD-10-CM

## 2025-04-01 PROCEDURE — G0145 SCR C/V CYTO,THINLAYER,RESCR: HCPCS | Performed by: OBSTETRICS & GYNECOLOGY

## 2025-04-01 PROCEDURE — 99396 PREV VISIT EST AGE 40-64: CPT | Performed by: OBSTETRICS & GYNECOLOGY

## 2025-04-01 PROCEDURE — G0476 HPV COMBO ASSAY CA SCREEN: HCPCS | Performed by: OBSTETRICS & GYNECOLOGY

## 2025-04-04 ENCOUNTER — APPOINTMENT (OUTPATIENT)
Dept: LAB | Facility: MEDICAL CENTER | Age: 55
End: 2025-04-04
Payer: COMMERCIAL

## 2025-04-04 DIAGNOSIS — Z78.0 MENOPAUSE: ICD-10-CM

## 2025-04-04 LAB
ALBUMIN SERPL BCG-MCNC: 4.7 G/DL (ref 3.5–5)
ALP SERPL-CCNC: 85 U/L (ref 34–104)
ALT SERPL W P-5'-P-CCNC: 26 U/L (ref 7–52)
AST SERPL W P-5'-P-CCNC: 25 U/L (ref 13–39)
BILIRUB DIRECT SERPL-MCNC: 0.18 MG/DL (ref 0–0.2)
BILIRUB SERPL-MCNC: 0.54 MG/DL (ref 0.2–1)
PROT SERPL-MCNC: 7.4 G/DL (ref 6.4–8.4)

## 2025-04-04 PROCEDURE — 80076 HEPATIC FUNCTION PANEL: CPT

## 2025-04-04 PROCEDURE — 36415 COLL VENOUS BLD VENIPUNCTURE: CPT

## 2025-04-07 ENCOUNTER — RESULTS FOLLOW-UP (OUTPATIENT)
Dept: FAMILY MEDICINE CLINIC | Facility: CLINIC | Age: 55
End: 2025-04-07

## 2025-04-07 DIAGNOSIS — Z78.0 MENOPAUSE: Primary | ICD-10-CM

## 2025-04-07 LAB
LAB AP GYN PRIMARY INTERPRETATION: NORMAL
Lab: NORMAL

## 2025-05-09 ENCOUNTER — HOSPITAL ENCOUNTER (OUTPATIENT)
Dept: MAMMOGRAPHY | Facility: HOSPITAL | Age: 55
Discharge: HOME | End: 2025-05-09
Payer: COMMERCIAL

## 2025-05-09 VITALS — BODY MASS INDEX: 30.31 KG/M2 | HEIGHT: 68 IN | WEIGHT: 200 LBS

## 2025-05-09 DIAGNOSIS — Z12.31 OTHER SCREENING MAMMOGRAM: ICD-10-CM

## 2025-05-09 PROCEDURE — 77063 BREAST TOMOSYNTHESIS BI: CPT

## 2025-05-09 PROCEDURE — 77067 SCR MAMMO BI INCL CAD: CPT

## 2025-05-14 ENCOUNTER — RESULTS FOLLOW-UP (OUTPATIENT)
Dept: FAMILY MEDICINE CLINIC | Facility: CLINIC | Age: 55
End: 2025-05-14

## 2025-05-15 ENCOUNTER — OFFICE VISIT (OUTPATIENT)
Dept: FAMILY MEDICINE CLINIC | Facility: CLINIC | Age: 55
End: 2025-05-15
Payer: COMMERCIAL

## 2025-05-15 ENCOUNTER — APPOINTMENT (OUTPATIENT)
Dept: LAB | Facility: MEDICAL CENTER | Age: 55
End: 2025-05-15
Attending: FAMILY MEDICINE
Payer: COMMERCIAL

## 2025-05-15 VITALS
HEIGHT: 68 IN | DIASTOLIC BLOOD PRESSURE: 80 MMHG | HEART RATE: 80 BPM | BODY MASS INDEX: 29.73 KG/M2 | OXYGEN SATURATION: 95 % | WEIGHT: 196.2 LBS | TEMPERATURE: 97.9 F | SYSTOLIC BLOOD PRESSURE: 124 MMHG

## 2025-05-15 DIAGNOSIS — Z00.00 ANNUAL PHYSICAL EXAM: Primary | ICD-10-CM

## 2025-05-15 DIAGNOSIS — E88.810 METABOLIC SYNDROME: ICD-10-CM

## 2025-05-15 DIAGNOSIS — E78.2 MIXED HYPERLIPIDEMIA: ICD-10-CM

## 2025-05-15 LAB
ANION GAP SERPL CALCULATED.3IONS-SCNC: 9 MMOL/L (ref 4–13)
BUN SERPL-MCNC: 18 MG/DL (ref 5–25)
CALCIUM SERPL-MCNC: 10.1 MG/DL (ref 8.4–10.2)
CHLORIDE SERPL-SCNC: 102 MMOL/L (ref 96–108)
CHOLEST SERPL-MCNC: 179 MG/DL (ref ?–200)
CO2 SERPL-SCNC: 29 MMOL/L (ref 21–32)
CREAT SERPL-MCNC: 0.67 MG/DL (ref 0.6–1.3)
GFR SERPL CREATININE-BSD FRML MDRD: 98 ML/MIN/1.73SQ M
GLUCOSE P FAST SERPL-MCNC: 95 MG/DL (ref 65–99)
HDLC SERPL-MCNC: 69 MG/DL
LDLC SERPL CALC-MCNC: 92 MG/DL (ref 0–100)
NONHDLC SERPL-MCNC: 110 MG/DL
POTASSIUM SERPL-SCNC: 3.9 MMOL/L (ref 3.5–5.3)
SODIUM SERPL-SCNC: 140 MMOL/L (ref 135–147)
TRIGL SERPL-MCNC: 90 MG/DL (ref ?–150)

## 2025-05-15 PROCEDURE — 36415 COLL VENOUS BLD VENIPUNCTURE: CPT | Performed by: FAMILY MEDICINE

## 2025-05-15 PROCEDURE — 99396 PREV VISIT EST AGE 40-64: CPT | Performed by: FAMILY MEDICINE

## 2025-05-15 PROCEDURE — 80048 BASIC METABOLIC PNL TOTAL CA: CPT | Performed by: FAMILY MEDICINE

## 2025-05-15 PROCEDURE — 80061 LIPID PANEL: CPT | Performed by: FAMILY MEDICINE

## 2025-05-15 NOTE — PROGRESS NOTES
Answers submitted by the patient for this visit:  Annual Physical (Submitted on 5/10/2025)  Diet/Nutrition choices: low fat diet, low carb diet  Exercise choices: walking, 3-4 times a week on average  Sleep choices: 4-6 hours of sleep on average  Hearing choices: normal hearing bilateral ears  Vision choices: wears glasses and contacts  Dental choices: regular dental visits, brushes teeth twice daily  Do you currently have an OB/GYN provider that you routinely follow with?: Yes  Menopausal status: perimenopausal  Any history of sexual transmitted disease/infection?: No  Contraception: hysterectomy, menopause, male partner had vasectomy  Do you have an advance directive/living will?: No  Do you have a durable power of  (POA)?: No  Name: Dinorah Horta      : 1970      MRN: [id number]  Encounter Provider: Usman Mckeon DO  Encounter Date: 5/15/2025   Encounter department: [facility]  :  Assessment & Plan  Annual physical exam                History of Present Illness   Annual physical      Review of Systems   Constitutional:  Positive for activity change and fatigue.   HENT:  Negative for dental problem and hearing loss.    Eyes:  Positive for visual disturbance.   Respiratory:  Negative for shortness of breath.    Cardiovascular:  Positive for chest pain. Negative for palpitations and leg swelling.   Gastrointestinal:         Patient is up-to-date on colonoscopies   Endocrine:        The patient is currently perimenopausal   Genitourinary:  Negative for difficulty urinating and enuresis.        Patient recently had pelvic sling surgery done and is now continent of her urine   Musculoskeletal:  Positive for arthralgias.        Patient is anticipating a left total knee arthroplasty by Dr. Ruelas sometime in July   Skin:  Negative for color change, pallor, rash and wound.   Neurological:  Positive for dizziness and headaches. Negative for tremors and weakness.   Psychiatric/Behavioral:  The  "patient is not nervous/anxious.        Objective   /80   Pulse 80   Temp 97.9 °F (36.6 °C)   Ht 5' 8\" (1.727 m)   Wt 89 kg (196 lb 3.2 oz)   SpO2 95%   BMI 29.83 kg/m²      Physical Exam  Constitutional:       Appearance: She is well-developed.   HENT:      Head: Normocephalic and atraumatic.      Right Ear: External ear normal.      Left Ear: External ear normal.      Nose: Nose normal.     Eyes:      Conjunctiva/sclera: Conjunctivae normal.      Pupils: Pupils are equal, round, and reactive to light.       Cardiovascular:      Rate and Rhythm: Normal rate and regular rhythm.      Heart sounds: Normal heart sounds. No murmur heard.     No friction rub.   Pulmonary:      Effort: Pulmonary effort is normal. No respiratory distress.      Breath sounds: Normal breath sounds. No wheezing or rales.   Chest:      Chest wall: No tenderness.   Abdominal:      General: Bowel sounds are normal.      Palpations: Abdomen is soft.     Musculoskeletal:         General: Normal range of motion.      Cervical back: Normal range of motion and neck supple.     Skin:     General: Skin is warm and dry.      Capillary Refill: Capillary refill takes 2 to 3 seconds.     Neurological:      Mental Status: She is alert and oriented to person, place, and time.     Psychiatric:         Behavior: Behavior normal.         Thought Content: Thought content normal.         Judgment: Judgment normal.         "

## 2025-05-16 ENCOUNTER — RESULTS FOLLOW-UP (OUTPATIENT)
Dept: FAMILY MEDICINE CLINIC | Facility: CLINIC | Age: 55
End: 2025-05-16

## 2025-05-16 NOTE — RESULT ENCOUNTER NOTE
Call patient to notify normal results lipid panel is good and her chemistry panel is good everything is really good

## 2025-06-03 ENCOUNTER — OFFICE VISIT (OUTPATIENT)
Dept: URGENT CARE | Facility: MEDICAL CENTER | Age: 55
End: 2025-06-03
Payer: COMMERCIAL

## 2025-06-03 ENCOUNTER — TELEPHONE (OUTPATIENT)
Age: 55
End: 2025-06-03

## 2025-06-03 VITALS
BODY MASS INDEX: 29.58 KG/M2 | WEIGHT: 195.2 LBS | HEIGHT: 68 IN | SYSTOLIC BLOOD PRESSURE: 124 MMHG | RESPIRATION RATE: 16 BRPM | DIASTOLIC BLOOD PRESSURE: 66 MMHG | OXYGEN SATURATION: 97 % | TEMPERATURE: 97.4 F | HEART RATE: 74 BPM

## 2025-06-03 DIAGNOSIS — R09.82 POSTNASAL DRIP: ICD-10-CM

## 2025-06-03 DIAGNOSIS — J01.40 ACUTE NON-RECURRENT PANSINUSITIS: Primary | ICD-10-CM

## 2025-06-03 PROCEDURE — G0382 LEV 3 HOSP TYPE B ED VISIT: HCPCS

## 2025-06-03 PROCEDURE — S9083 URGENT CARE CENTER GLOBAL: HCPCS

## 2025-06-03 RX ORDER — BROMPHENIRAMINE MALEATE, PSEUDOEPHEDRINE HYDROCHLORIDE, AND DEXTROMETHORPHAN HYDROBROMIDE 2; 30; 10 MG/5ML; MG/5ML; MG/5ML
5 SYRUP ORAL 4 TIMES DAILY PRN
Qty: 120 ML | Refills: 0 | Status: SHIPPED | OUTPATIENT
Start: 2025-06-03

## 2025-06-03 RX ORDER — BROMPHENIRAMINE MALEATE, PSEUDOEPHEDRINE HYDROCHLORIDE, AND DEXTROMETHORPHAN HYDROBROMIDE 2; 30; 10 MG/5ML; MG/5ML; MG/5ML
5 SYRUP ORAL 4 TIMES DAILY PRN
Qty: 120 ML | Refills: 0 | Status: SHIPPED | OUTPATIENT
Start: 2025-06-03 | End: 2025-06-03

## 2025-06-03 NOTE — PATIENT INSTRUCTIONS
Take Amoxicillin as prescribed   Tylenol Sinus over the counter  Warm compresses over sinuses  Steam treatment (practice proper safety precautions when handing hot liquids/steam)  Over the counter saline nasal spray  Follow up with PCP in 3-5 days.  Proceed to  ER if symptoms worsen.    Eat yogurt with live and active cultures and/or take a probiotic at least 3 hours before or after antibiotic dose. Monitor stool for diarrhea and/or blood. If this occurs, contact primary care doctor ASAP.     Take Bromfed DM and as prescribed    Take over the counter Mucinex during the day  Take over the counter cough suppressant at night  Fluids and rest (Warm water with honey and lemon)  Tylenol/Ibuprofen for pain fever

## 2025-06-03 NOTE — PROGRESS NOTES
[facility]  Name: Dinorah Horta      : 1970      MRN: [id number]  Encounter Provider: TRINITY Ortiz  Encounter Date: 6/3/2025   Encounter department: [facility]  :  Assessment & Plan  Acute non-recurrent pansinusitis    Orders:  •  amoxicillin-clavulanate (AUGMENTIN) 875-125 mg per tablet; Take 1 tablet by mouth every 12 (twelve) hours for 7 days  •  brompheniramine-pseudoephedrine-DM 30-2-10 MG/5ML syrup; Take 5 mL by mouth 4 (four) times a day as needed for cough    Postnasal drip             Patient Instructions  Take Amoxicillin as prescribed   Tylenol Sinus over the counter  Warm compresses over sinuses  Steam treatment (practice proper safety precautions when handing hot liquids/steam)  Over the counter saline nasal spray  Follow up with PCP in 3-5 days.  Proceed to  ER if symptoms worsen.    Eat yogurt with live and active cultures and/or take a probiotic at least 3 hours before or after antibiotic dose. Monitor stool for diarrhea and/or blood. If this occurs, contact primary care doctor ASAP.     Take Bromfed DM and as prescribed    Take over the counter Mucinex during the day  Take over the counter cough suppressant at night  Fluids and rest (Warm water with honey and lemon)  Tylenol/Ibuprofen for pain fever    Follow up with PCP in 3-5 days.  Proceed to  ER if symptoms worsen.    If tests are performed, our office will contact you with results only if changes need to made to the care plan discussed with you at the visit. [facility]    Chief Complaint:   Chief Complaint   Patient presents with   • Cold Like Symptoms     Started 10 days ago  Coughing productive, and productive, sore throat, bilateral earache, headache  OTC mucinex, and sudafed     History of Present Illness   Cough  This is a new problem. The current episode started 1 to 4 weeks ago. The problem has been gradually worsening. The problem occurs every few  minutes. The cough is Non-productive. Associated symptoms include ear congestion, a fever, nasal congestion, postnasal drip and a sore throat. Pertinent negatives include no chest pain, chills, ear pain, rash or shortness of breath. She has tried OTC cough suppressant for the symptoms.   Sinusitis  This is a new problem. The current episode started 1 to 4 weeks ago. The problem is unchanged. The maximum temperature recorded prior to her arrival was 100.4 - 100.9 F. Her pain is at a severity of 4/10. Associated symptoms include coughing and a sore throat. Pertinent negatives include no chills, ear pain or shortness of breath. Past treatments include nothing.         Review of Systems   Constitutional:  Positive for fever. Negative for chills.   HENT:  Positive for postnasal drip and sore throat. Negative for ear pain.    Eyes:  Negative for pain and visual disturbance.   Respiratory:  Positive for cough. Negative for shortness of breath.    Cardiovascular:  Negative for chest pain and palpitations.   Gastrointestinal:  Negative for abdominal pain and vomiting.   Genitourinary:  Negative for dysuria and hematuria.   Musculoskeletal:  Negative for arthralgias and back pain.   Skin:  Negative for color change and rash.   Neurological:  Negative for seizures and syncope.   All other systems reviewed and are negative.    Past Medical History   Past Medical History[1]  Past Surgical History[2]  Family History[3]  she reports that she has never smoked. She has never used smokeless tobacco. She reports current alcohol use of about 2.0 standard drinks of alcohol per week. She reports that she does not use drugs.  Current Outpatient Medications   Medication Instructions   • amoxicillin-clavulanate (AUGMENTIN) 875-125 mg per tablet 1 tablet, Oral, Every 12 hours scheduled   • ascorbic acid (VITAMIN C) 1000 MG tablet    • brompheniramine-pseudoephedrine-DM 30-2-10 MG/5ML syrup 5 mL, Oral, 4 times daily PRN   •  "Calcium-Magnesium-Vitamin D (CALCIUM 1200+D3 PO)    • cetirizine (ZYRTEC) 10 mg, Daily   • Cholecalciferol 50 MCG (2000 UT) CAPS Daily   • desvenlafaxine succinate (PRISTIQ) 50 mg, Oral, Daily   •  MG tablet TAKE 1 TABLET EVERY 12 HOURS AS NEEDED FOR MODERATE PAIN   • metoprolol succinate (TOPROL-XL) 50 mg, Oral, Daily   • Multiple Vitamin (MULTIVITAMIN) tablet 1 tablet, Daily   • Omega-3 Fatty Acids (Ultra Omega-3 Fish Oil) 1400 MG CAPS    • pantoprazole (PROTONIX) 40 mg, Oral, Daily   • rosuvastatin (CRESTOR) 10 mg, Oral, Daily   • spironolactone (ALDACTONE) 25 mg, Oral, Daily   • TURMERIC PO Take by mouth   Allergies[4]     Objective   /66   Pulse 74   Temp (!) 97.4 °F (36.3 °C)   Resp 16   Ht 5' 8\" (1.727 m)   Wt 88.5 kg (195 lb 3.2 oz)   SpO2 97%   BMI 29.68 kg/m²      Physical Exam  Vitals and nursing note reviewed.   Constitutional:       General: She is not in acute distress.     Appearance: She is well-developed.   HENT:      Head: Normocephalic and atraumatic.      Nose:      Right Turbinates: Enlarged.      Left Turbinates: Enlarged.      Right Sinus: Maxillary sinus tenderness and frontal sinus tenderness present.      Left Sinus: Maxillary sinus tenderness and frontal sinus tenderness present.      Mouth/Throat:      Pharynx: Posterior oropharyngeal erythema present.     Eyes:      Conjunctiva/sclera: Conjunctivae normal.       Cardiovascular:      Rate and Rhythm: Normal rate and regular rhythm.      Heart sounds: No murmur heard.  Pulmonary:      Effort: Pulmonary effort is normal. No respiratory distress.      Breath sounds: Normal breath sounds.   Abdominal:      Palpations: Abdomen is soft.      Tenderness: There is no abdominal tenderness.     Musculoskeletal:         General: No swelling.      Cervical back: Neck supple.     Skin:     General: Skin is warm and dry.      Capillary Refill: Capillary refill takes less than 2 seconds.     Neurological:      Mental Status: She is " "alert.     Psychiatric:         Mood and Affect: Mood normal.       Portions of the record may have been created with voice recognition software.  Occasional wrong word or \"sound a like\" substitutions may have occurred due to the inherent limitations of voice recognition software.  Read the chart carefully and recognize, using context, where substitutions have occurred.         [1]  Past Medical History:  Diagnosis Date   • Arthritis    • Chronic pain disorder    • Diverticulosis    • GERD (gastroesophageal reflux disease)    • Hyperlipidemia    • Hypertension    • Lumbar herniated disc    • Menopause ovarian failure    • PONV (postoperative nausea and vomiting)    [2]  Past Surgical History:  Procedure Laterality Date   • BREAST BIOPSY Left 2009    core bx; benign    • BREAST BIOPSY Right 05/18/2022    US guided biopsy   • CHOLECYSTECTOMY  1995   • EPIDURAL BLOCK INJECTION Right 12/02/2021    Procedure: L4 and L5 TRANSFORAMINAL EPIDURAL STEROID INJECTION;  Surgeon: Gustavo Bowen MD;  Location: [facility];  Service: Pain Management    • EPIDURAL BLOCK INJECTION N/A 12/28/2021    Procedure: BLOCK / INJECTION EPIDURAL STEROID LUMBAR L4-L5;  Surgeon: Gustavo Bowen MD;  Location: [facility];  Service: Pain Management    • FL GUIDED NEEDLE PLAC BX/ASP/INJ  03/03/2022   • FL GUIDED NEEDLE PLAC BX/ASP/INJ  04/14/2022   • HYSTERECTOMY      age 33   • MRI BREAST BIOPSY RIGHT (ALL INCLUSIVE) Right 12/29/2023   • NERVE BLOCK Bilateral 02/15/2022    Procedure: BLOCK MEDIAL BRANCH L3, L4, L5 #1;  Surgeon: Gustavo Bowen MD;  Location: [facility];  Service: Pain Management    • NERVE BLOCK Bilateral 03/03/2022    Procedure: BLOCK MEDIAL BRANCH L3, L4, L5 #2;  Surgeon: Gustavo Bowen MD;  Location: [facility];  Service: Pain Management    • SD SLING OPERATION STRESS INCONTINENCE N/A 5/16/2024    Procedure: INSERTION PV SLING (FEMALE)  & EUA, CYSTOSCOPY;  Surgeon: Tim Grant DO;  Location: [facility];  Service: Gynecology " "  • RHIZOTOMY Bilateral 04/14/2022    Procedure: RHIZOTOMY LUMBAR L3, L4, L5;  Surgeon: Gustavo Bowen MD;  Location: [facility];  Service: Pain Management    • RHIZOTOMY Bilateral 11/15/2022    Procedure: RHIZOTOMY LUMBAR L3, L4, L5;  Surgeon: Gustavo Bowen MD;  Location: [facility];  Service: Pain Management    • TOOTH EXTRACTION     • US GUIDED BREAST BIOPSY RIGHT COMPLETE Right 05/18/2022   [3]  Family History  Problem Relation Name Age of Onset   • Alzheimer's disease Mother     • Lung cancer Mother     • Heart disease Father Hansford         cardiac disorder   • Prostate cancer Father Hansford    • Heart attack Father Hansford    • Breast cancer Sister Ghislaine Edwards 63   • Thyroid disease Sister Ghislaine Edwards    • Asthma Daughter     • Thyroid disease Daughter Hazel Horta    • Tongue cancer Maternal Grandmother     • Lung cancer Maternal Grandfather     • No Known Problems Paternal Grandmother     • Lung cancer Paternal Grandfather     • No Known Problems Son     • No Known Problems Son     • Emphysema Maternal Aunt     • No Known Problems Paternal Aunt     • No Known Problems Paternal Aunt     • No Known Problems Paternal Aunt     • Breast cancer Cousin maternal 45        maternal first cousin   [4]  Allergies  Allergen Reactions   • Keflex [Cephalexin] Palpitations and Hyperactivity     Pt stated she felt \"sped up\"   • Codeine Vomiting     \"Projectile vomiting\"   "

## 2025-06-03 NOTE — TELEPHONE ENCOUNTER
Cough and congestion going for a week. No available appointment today patient will go urgent care.

## 2025-06-05 ENCOUNTER — NURSE TRIAGE (OUTPATIENT)
Age: 55
End: 2025-06-05

## 2025-06-05 DIAGNOSIS — J22 LOWER RESPIRATORY INFECTION: Primary | ICD-10-CM

## 2025-06-05 RX ORDER — AZITHROMYCIN 250 MG/1
TABLET, FILM COATED ORAL
Qty: 6 TABLET | Refills: 0 | Status: SHIPPED | OUTPATIENT
Start: 2025-06-05 | End: 2025-06-09

## 2025-06-05 NOTE — TELEPHONE ENCOUNTER
"REASON FOR CONVERSATION: Cough    SYMPTOMS: cough for about two weeks, worsening; cough is \"junky, rattly,\" frequent and interferes with sleep; mild heaviness in chest, suspected fever, fatigue, achiness.    OTHER HEALTH INFORMATION: Patient was seen at Urgent Care 2 days ago, prescribed Augmentin and cough syrup. She stopped taking Augmentin after two doses due to severe diarrhea (patient is taking probiotics and eating yogurt). She states the cough syrup only gives temporary relief at night. She is taking Mucinex.     Patient reports history of sinus infection progressing to bronchitis.    PROTOCOL DISPOSITION: See Today in Office    CARE ADVICE PROVIDED: Patient is unable to come in for office visit today due to a pre-op appointment with another provider. She requests PCP recommendation.    Patient uses [facility] in [place], if PCP sends prescription.    PRACTICE FOLLOW-UP: Please inform patient of PCP recommendation.      Reason for Disposition   SEVERE coughing spells (e.g., whooping sound after coughing, vomiting after coughing)    Answer Assessment - Initial Assessment Questions  1. ONSET: \"When did the cough begin?\"       About 2 weeks ago    2. SEVERITY: \"How bad is the cough today?\"       Moderate, frequent, congested    3. SPUTUM: \"Describe the color of your sputum\" (e.g., none, dry cough; clear, white, yellow, green)      \"Junky, rattly\" cough, but patient unable to bring up phlegm    4. HEMOPTYSIS: \"Are you coughing up any blood?\" If Yes, ask: \"How much?\" (e.g., flecks, streaks, tablespoons, etc.)      Denies    5. DIFFICULTY BREATHING: \"Are you having difficulty breathing?\" If Yes, ask: \"How bad is it?\" (e.g., mild, moderate, severe)       Mild heaviness in chest, denies shortness of breath    6. FEVER: \"Do you have a fever?\" If Yes, ask: \"What is your temperature, how was it measured, and when did it start?\"      Patient feels feverish, unsure of temp    7. OTHER SYMPTOMS: \"Do you have any other " "symptoms?\" (e.g., runny nose, wheezing, chest pain)        Sinus congestion, fatigue; patient developed diarrhea after starting Augmentin, so she has stopped taking that med    Protocols used: Cough-Adult-[place]    "

## 2025-06-09 NOTE — TELEPHONE ENCOUNTER
Patient called back today to see if a new antibiotic was ever prescribed for her last week. Patient made aware azithromycin was sent to [facility], [place] for her last Thursday.  Patient will  medication today.

## 2025-06-23 ENCOUNTER — APPOINTMENT (OUTPATIENT)
Dept: LAB | Facility: HOSPITAL | Age: 55
End: 2025-06-23
Payer: COMMERCIAL

## 2025-06-23 ENCOUNTER — CONSULT (OUTPATIENT)
Dept: FAMILY MEDICINE CLINIC | Facility: CLINIC | Age: 55
End: 2025-06-23
Payer: COMMERCIAL

## 2025-06-23 ENCOUNTER — HOSPITAL ENCOUNTER (OUTPATIENT)
Dept: RADIOLOGY | Facility: HOSPITAL | Age: 55
Discharge: HOME | End: 2025-06-23
Payer: COMMERCIAL

## 2025-06-23 VITALS
BODY MASS INDEX: 29.55 KG/M2 | SYSTOLIC BLOOD PRESSURE: 128 MMHG | WEIGHT: 195 LBS | TEMPERATURE: 96.9 F | HEART RATE: 88 BPM | DIASTOLIC BLOOD PRESSURE: 84 MMHG | HEIGHT: 68 IN | OXYGEN SATURATION: 97 %

## 2025-06-23 DIAGNOSIS — Z01.818 PREOPERATIVE TESTING: Primary | ICD-10-CM

## 2025-06-23 DIAGNOSIS — Z01.818 PREOPERATIVE TESTING: ICD-10-CM

## 2025-06-23 PROCEDURE — 71046 X-RAY EXAM CHEST 2 VIEWS: CPT

## 2025-06-23 PROCEDURE — 99214 OFFICE O/P EST MOD 30 MIN: CPT | Performed by: FAMILY MEDICINE

## 2025-06-23 RX ORDER — CHLORHEXIDINE GLUCONATE 40 MG/ML
SOLUTION TOPICAL
COMMUNITY
Start: 2025-06-05 | End: 2025-06-30 | Stop reason: ALTCHOICE

## 2025-06-23 RX ORDER — CELECOXIB 200 MG/1
200 CAPSULE ORAL DAILY
COMMUNITY
Start: 2025-06-05

## 2025-06-24 ENCOUNTER — APPOINTMENT (OUTPATIENT)
Dept: LAB | Facility: MEDICAL CENTER | Age: 55
End: 2025-06-24
Attending: FAMILY MEDICINE
Payer: COMMERCIAL

## 2025-06-24 DIAGNOSIS — Z01.818 PREOPERATIVE TESTING: ICD-10-CM

## 2025-06-24 LAB
ALBUMIN SERPL BCG-MCNC: 4.5 G/DL (ref 3.5–5)
ALP SERPL-CCNC: 61 U/L (ref 34–104)
ALT SERPL W P-5'-P-CCNC: 29 U/L (ref 7–52)
ANION GAP SERPL CALCULATED.3IONS-SCNC: 11 MMOL/L (ref 4–13)
AST SERPL W P-5'-P-CCNC: 27 U/L (ref 13–39)
ATRIAL RATE: 81 BPM
BASOPHILS # BLD AUTO: 0.04 THOUSANDS/ÂΜL (ref 0–0.1)
BASOPHILS NFR BLD AUTO: 1 % (ref 0–1)
BILIRUB SERPL-MCNC: 1.12 MG/DL (ref 0.2–1)
BILIRUB UR QL STRIP: NEGATIVE
BUN SERPL-MCNC: 15 MG/DL (ref 5–25)
CALCIUM SERPL-MCNC: 9.9 MG/DL (ref 8.4–10.2)
CHLORIDE SERPL-SCNC: 104 MMOL/L (ref 96–108)
CLARITY UR: CLEAR
CO2 SERPL-SCNC: 25 MMOL/L (ref 21–32)
COLOR UR: NORMAL
CREAT SERPL-MCNC: 0.66 MG/DL (ref 0.6–1.3)
EOSINOPHIL # BLD AUTO: 0.08 THOUSAND/ÂΜL (ref 0–0.61)
EOSINOPHIL NFR BLD AUTO: 2 % (ref 0–6)
ERYTHROCYTE [DISTWIDTH] IN BLOOD BY AUTOMATED COUNT: 13.6 % (ref 11.6–15.1)
GFR SERPL CREATININE-BSD FRML MDRD: 99 ML/MIN/1.73SQ M
GLUCOSE P FAST SERPL-MCNC: 98 MG/DL (ref 65–99)
GLUCOSE UR STRIP-MCNC: NEGATIVE MG/DL
HCT VFR BLD AUTO: 41.8 % (ref 34.8–46.1)
HGB BLD-MCNC: 13.5 G/DL (ref 11.5–15.4)
HGB UR QL STRIP.AUTO: NEGATIVE
IMM GRANULOCYTES # BLD AUTO: 0.01 THOUSAND/UL (ref 0–0.2)
IMM GRANULOCYTES NFR BLD AUTO: 0 % (ref 0–2)
INR PPP: 0.94 (ref 0.85–1.19)
KETONES UR STRIP-MCNC: NEGATIVE MG/DL
LEUKOCYTE ESTERASE UR QL STRIP: NEGATIVE
LYMPHOCYTES # BLD AUTO: 1.4 THOUSANDS/ÂΜL (ref 0.6–4.47)
LYMPHOCYTES NFR BLD AUTO: 35 % (ref 14–44)
MCH RBC QN AUTO: 30.1 PG (ref 26.8–34.3)
MCHC RBC AUTO-ENTMCNC: 32.3 G/DL (ref 31.4–37.4)
MCV RBC AUTO: 93 FL (ref 82–98)
MONOCYTES # BLD AUTO: 0.46 THOUSAND/ÂΜL (ref 0.17–1.22)
MONOCYTES NFR BLD AUTO: 12 % (ref 4–12)
NEUTROPHILS # BLD AUTO: 1.98 THOUSANDS/ÂΜL (ref 1.85–7.62)
NEUTS SEG NFR BLD AUTO: 50 % (ref 43–75)
NITRITE UR QL STRIP: NEGATIVE
NRBC BLD AUTO-RTO: 0 /100 WBCS
P AXIS: 15 DEGREES
PH UR STRIP.AUTO: 6 [PH]
PLATELET # BLD AUTO: 236 THOUSANDS/UL (ref 149–390)
PMV BLD AUTO: 10.8 FL (ref 8.9–12.7)
POTASSIUM SERPL-SCNC: 3.9 MMOL/L (ref 3.5–5.3)
PR INTERVAL: 176 MS
PROT SERPL-MCNC: 7.2 G/DL (ref 6.4–8.4)
PROT UR STRIP-MCNC: NEGATIVE MG/DL
PROTHROMBIN TIME: 12.9 SECONDS (ref 12.3–15)
QRS AXIS: 9 DEGREES
QRSD INTERVAL: 82 MS
QT INTERVAL: 374 MS
QTC INTERVAL: 435 MS
RBC # BLD AUTO: 4.48 MILLION/UL (ref 3.81–5.12)
SODIUM SERPL-SCNC: 140 MMOL/L (ref 135–147)
SP GR UR STRIP.AUTO: 1.02 (ref 1–1.03)
T WAVE AXIS: 30 DEGREES
UROBILINOGEN UR STRIP-ACNC: <2 MG/DL
VENTRICULAR RATE: 81 BPM
WBC # BLD AUTO: 3.97 THOUSAND/UL (ref 4.31–10.16)

## 2025-06-24 PROCEDURE — 80053 COMPREHEN METABOLIC PANEL: CPT | Performed by: FAMILY MEDICINE

## 2025-06-24 PROCEDURE — 36415 COLL VENOUS BLD VENIPUNCTURE: CPT | Performed by: FAMILY MEDICINE

## 2025-06-24 PROCEDURE — 85025 COMPLETE CBC W/AUTO DIFF WBC: CPT

## 2025-06-24 PROCEDURE — 85610 PROTHROMBIN TIME: CPT | Performed by: FAMILY MEDICINE

## 2025-06-24 PROCEDURE — 81003 URINALYSIS AUTO W/O SCOPE: CPT

## 2025-06-24 PROCEDURE — 93010 ELECTROCARDIOGRAM REPORT: CPT | Performed by: INTERNAL MEDICINE

## 2025-06-30 ENCOUNTER — EVALUATION (OUTPATIENT)
Dept: PHYSICAL THERAPY | Facility: CLINIC | Age: 55
End: 2025-06-30
Payer: COMMERCIAL

## 2025-06-30 ENCOUNTER — TELEPHONE (OUTPATIENT)
Age: 55
End: 2025-06-30

## 2025-06-30 DIAGNOSIS — M17.12 PRIMARY OSTEOARTHRITIS OF LEFT KNEE: Primary | ICD-10-CM

## 2025-06-30 DIAGNOSIS — G89.29 CHRONIC PAIN OF LEFT KNEE: ICD-10-CM

## 2025-06-30 DIAGNOSIS — M25.562 CHRONIC PAIN OF LEFT KNEE: ICD-10-CM

## 2025-06-30 PROCEDURE — 97161 PT EVAL LOW COMPLEX 20 MIN: CPT | Performed by: PHYSICAL THERAPIST

## 2025-06-30 PROCEDURE — 97110 THERAPEUTIC EXERCISES: CPT | Performed by: PHYSICAL THERAPIST

## 2025-06-30 PROCEDURE — 97535 SELF CARE MNGMENT TRAINING: CPT | Performed by: PHYSICAL THERAPIST

## 2025-06-30 NOTE — PROGRESS NOTES
Pre-operative Clearance  Name: Dinorah Horta      : 1970      MRN: [id number]  Encounter Provider: Usman Mckeon DO  Encounter Date: 2025   Encounter department: [facility]    :  Assessment & Plan  Preoperative testing    Orders:    ECG 12 lead; Future    CBC and differential; Future    UA w Reflex to Microscopic w Reflex to Culture; Future    Protime-INR    Comprehensive metabolic panel    XR chest pa and lateral; Future        Pre-operative Clearance:     Revised Cardiac Risk Index:  RCI RISK CLASS I (0 risk factors, risk of major cardiac complications approximately 0.5%)    Clearance:  Patient is medically optimized (CLEARED) for proposed surgery without any additional cardiac testing.      Medication Instructions:   - Avoid herbs or non-directed vitamins one week prior to surgery    - Avoid aspirin containing medications or non-steroidal anti-inflammatory drugs one week preceding surgery    - May take tylenol for pain up until the night before surgery    - Beta blockers:  Continue to take this medication on your normal schedule.  - Hyperlipidemia meds: Continue to take this medication on your normal schedule.      Depression Screening and Follow-up Plan: Patient was screened for depression during today's encounter. They screened negative with a PHQ-2 score of 0.        History of Present Illness     Pre-Op Examination     Surgery: Total knee arthroplasty   Anticipated Date of Surgery: 2025   Surgeon: [facility] [place]     Previous history of bleeding disorders or clots?: No    Previous Anesthesia reaction?: No    Prolonged steroid use in the last 6 months?: No      Assessment of Cardiac Risk:     - Decompensated heart failure (e.g. New onset heart failure, NYHA  Class IV heart failure, or worsening existing heart failure)?: No    - Significant arrhythmias such as high grade AV block, symptomatic ventricular arrhythmia, newly recognized ventricular tachycardia,  "supraventricular tachycardia with resting heart rate >100, or symptomatic bradycardia?: No    - Severe heart valve disease including aortic stenosis or symptomatic mitral stenosis?: No      Pre-operative Risk Factors:    - History of cerebrovascular disease: No    - History of ischemic heart disease: No    - History of congestive heart failure: No    - Pre-operative treatment with insulin: No    - Pre-operative creatinine >2 mg/dL: No      Review of Systems   Constitutional:  Negative for activity change, appetite change, diaphoresis, fatigue and fever.   HENT: Negative.     Eyes: Negative.    Respiratory:  Negative for apnea, cough, chest tightness, shortness of breath and wheezing.    Cardiovascular:  Negative for chest pain, palpitations and leg swelling.   Gastrointestinal:  Negative for abdominal distention, abdominal pain, anal bleeding, constipation, diarrhea, nausea and vomiting.   Endocrine: Negative for cold intolerance, heat intolerance, polydipsia, polyphagia and polyuria.   Genitourinary:  Negative for difficulty urinating, dysuria, flank pain, hematuria and urgency.   Musculoskeletal:  Positive for arthralgias. Negative for back pain, gait problem, joint swelling and myalgias.   Skin:  Negative for color change, rash and wound.   Allergic/Immunologic: Negative for environmental allergies, food allergies and immunocompromised state.   Neurological:  Negative for dizziness, seizures, syncope, speech difficulty, numbness and headaches.   Hematological:  Negative for adenopathy. Does not bruise/bleed easily.   Psychiatric/Behavioral:  Negative for agitation, behavioral problems, hallucinations, sleep disturbance and suicidal ideas.      Past Medical History   Past Medical History[1]  Past Surgical History[2]  Family History[3]  Social History[4]  Medications[5]  Allergies   Allergen Reactions    Keflex [Cephalexin] Palpitations and Hyperactivity     Pt stated she felt \"sped up\"    Codeine Vomiting     " "\"Projectile vomiting\"     Objective   /84 (BP Location: Left arm, Patient Position: Sitting, Cuff Size: Large)   Pulse 88   Temp (!) 96.9 °F (36.1 °C) (Temporal)   Ht 5' 8\" (1.727 m)   Wt 88.5 kg (195 lb)   SpO2 97%   BMI 29.65 kg/m²     Physical Exam  Constitutional:       Appearance: She is well-developed.   HENT:      Head: Normocephalic and atraumatic.      Right Ear: External ear normal.      Left Ear: External ear normal.      Nose: Nose normal.     Eyes:      Conjunctiva/sclera: Conjunctivae normal.      Pupils: Pupils are equal, round, and reactive to light.       Cardiovascular:      Rate and Rhythm: Normal rate and regular rhythm.      Heart sounds: Normal heart sounds. No murmur heard.     No friction rub.   Pulmonary:      Effort: Pulmonary effort is normal. No respiratory distress.      Breath sounds: Normal breath sounds. No wheezing or rales.   Chest:      Chest wall: No tenderness.   Abdominal:      General: Bowel sounds are normal.      Palpations: Abdomen is soft.     Musculoskeletal:         General: Swelling present. Normal range of motion.      Cervical back: Normal range of motion and neck supple.     Skin:     General: Skin is warm and dry.      Capillary Refill: Capillary refill takes 2 to 3 seconds.     Neurological:      Mental Status: She is alert and oriented to person, place, and time.     Psychiatric:         Behavior: Behavior normal.         Thought Content: Thought content normal.         Judgment: Judgment normal.           Usman Mckeon DO       [1]   Past Medical History:  Diagnosis Date    Allergic     Anxiety     Arthritis     Breast cyst     Chronic pain disorder     Depression     Diverticulosis     Fibrocystic breast     GERD (gastroesophageal reflux disease)     Hyperlipidemia     Hypertension     Lumbar herniated disc     Menopause ovarian failure     PONV (postoperative nausea and vomiting)    [2]   Past Surgical History:  Procedure Laterality Date    BREAST " BIOPSY Left 2009    core bx; benign     BREAST BIOPSY Right 05/18/2022    US guided biopsy    CHOLECYSTECTOMY  1995    EPIDURAL BLOCK INJECTION Right 12/02/2021    Procedure: L4 and L5 TRANSFORAMINAL EPIDURAL STEROID INJECTION;  Surgeon: Gustavo Bowen MD;  Location: [facility];  Service: Pain Management     EPIDURAL BLOCK INJECTION N/A 12/28/2021    Procedure: BLOCK / INJECTION EPIDURAL STEROID LUMBAR L4-L5;  Surgeon: Gustavo Bowen MD;  Location: [facility];  Service: Pain Management     FL GUIDED NEEDLE PLAC BX/ASP/INJ  03/03/2022    FL GUIDED NEEDLE PLAC BX/ASP/INJ  04/14/2022    HYSTERECTOMY      age 33    MRI BREAST BIOPSY RIGHT (ALL INCLUSIVE) Right 12/29/2023    NERVE BLOCK Bilateral 02/15/2022    Procedure: BLOCK MEDIAL BRANCH L3, L4, L5 #1;  Surgeon: Gustavo Bowen MD;  Location: [facility];  Service: Pain Management     NERVE BLOCK Bilateral 03/03/2022    Procedure: BLOCK MEDIAL BRANCH L3, L4, L5 #2;  Surgeon: Gustavo Bowen MD;  Location: [facility];  Service: Pain Management     DC SLING OPERATION STRESS INCONTINENCE N/A 05/16/2024    Procedure: INSERTION PV SLING (FEMALE)  & EUA, CYSTOSCOPY;  Surgeon: Tim Grant DO;  Location: [facility];  Service: Gynecology    RHIZOTOMY Bilateral 04/14/2022    Procedure: RHIZOTOMY LUMBAR L3, L4, L5;  Surgeon: Gustavo Bowen MD;  Location: [facility];  Service: Pain Management     RHIZOTOMY Bilateral 11/15/2022    Procedure: RHIZOTOMY LUMBAR L3, L4, L5;  Surgeon: Gustavo Bowen MD;  Location: [facility];  Service: Pain Management     TOOTH EXTRACTION      UPPER GASTROINTESTINAL ENDOSCOPY      US GUIDED BREAST BIOPSY RIGHT COMPLETE Right 05/18/2022   [3]   Family History  Problem Relation Name Age of Onset    Alzheimer's disease Mother Catie Collura     Lung cancer Mother Catie Collura     Dementia Mother Catie Collura     Arthritis Mother Catie Collura     Heart disease Father Maricopa Collura         cardiac disorder    Prostate cancer Father Earle  Collura     Heart attack Father Independence Collura     Arthritis Father Independence Collura     Cancer Father Independence Collura     Breast cancer Sister Ghislaine Collura 63    Thyroid disease Sister Ghislaine Collura     Alcohol abuse Sister Ghislaine Collura     Arthritis Sister Ghislaine Collura     Anxiety disorder Sister Ghislaine Collura     Cancer Sister Ghislaine Collura     Asthma Daughter Hazel     Thyroid disease Daughter Hazel Hadesty     Asthma Daughter Hazel Hadesty     Tongue cancer Maternal Grandmother      Lung cancer Maternal Grandfather August Marielle     Alcohol abuse Maternal Grandfather August Marielle     Cancer Maternal Grandfather August Marielle     Diabetes Paternal Grandmother Abby Collura     Lung cancer Paternal Grandfather Tulio Collura     Cancer Paternal Grandfather Tulio Landerosura     No Known Problems Son      No Known Problems Son      Emphysema Maternal Aunt Allison Alexandra     Alcohol abuse Maternal Aunt Allison Alexandra     No Known Problems Paternal Aunt      No Known Problems Paternal Aunt      No Known Problems Paternal Aunt      Breast cancer Cousin Puja Hoda 45        maternal first cousin    Asthma Cousin Puja Hoda     Breast cancer Cousin Betsy Tasha     Breast cancer Cousin Betsy Tasha    [4]   Social History  Tobacco Use    Smoking status: Never    Smokeless tobacco: Never   Vaping Use    Vaping status: Never Used   Substance and Sexual Activity    Alcohol use: Yes     Alcohol/week: 2.0 standard drinks of alcohol     Types: 2 Glasses of wine per week     Comment: social drinker    Drug use: No    Sexual activity: Yes     Partners: Male     Birth control/protection: Male Sterilization, Other     Comment: Hysterectomy   [5]   Current Outpatient Medications on File Prior to Visit   Medication Sig    metoprolol succinate (TOPROL-XL) 50 mg 24 hr tablet TAKE 1 TABLET DAILY    Multiple Vitamin (MULTIVITAMIN) tablet Take 1 tablet by mouth in the morning.    pantoprazole (PROTONIX) 40 mg tablet TAKE 1 TABLET DAILY     rosuvastatin (CRESTOR) 10 MG tablet TAKE 1 TABLET DAILY    spironolactone (ALDACTONE) 25 mg tablet TAKE 1 TABLET DAILY    TURMERIC PO Take by mouth    [DISCONTINUED] ascorbic acid (VITAMIN C) 1000 MG tablet     [DISCONTINUED] Calcium-Magnesium-Vitamin D (CALCIUM 1200+D3 PO)     [DISCONTINUED] cetirizine (ZyrTEC) 10 MG chewable tablet Chew 10 mg in the morning.    [DISCONTINUED] Cholecalciferol 50 MCG (2000 UT) CAPS Take by mouth in the morning.    [DISCONTINUED] desvenlafaxine succinate (PRISTIQ) 50 mg 24 hr tablet TAKE 1 TABLET DAILY    [DISCONTINUED] Omega-3 Fatty Acids (Ultra Omega-3 Fish Oil) 1400 MG CAPS     celecoxib (CeleBREX) 200 mg capsule Take 200 mg by mouth daily 3 days prior to surgery - DO NOT TAKE ON DAY OF SURGERY (Patient not taking: Reported on 6/23/2025)    chlorhexidine gluconate (Hibiclens) 4 % external liquid Apply topically Wash surgical site daily starting 5 days before surgery (Patient not taking: Reported on 6/23/2025)    [DISCONTINUED] brompheniramine-pseudoephedrine-DM 30-2-10 MG/5ML syrup Take 5 mL by mouth 4 (four) times a day as needed for cough (Patient not taking: Reported on 6/23/2025)    [DISCONTINUED]  MG tablet TAKE 1 TABLET EVERY 12 HOURS AS NEEDED FOR MODERATE PAIN (Patient not taking: Reported on 6/23/2025)

## 2025-06-30 NOTE — PATIENT INSTRUCTIONS
Pre-operative Medication Instructions    Avoid herbs or non-directed vitamins one week prior to surgery  Avoid aspirin containing medications or non-steroidal anti-inflammatory drugs one week preceding surgery  May take tylenol for pain up until the night before surgery    Beta Blockers     Medication Name     metoprolol succinate (TOPROL-XL) 50 mg 24 hr tablet      Continue to take this heart medication on your normal schedule.  If this is an oral medication and you take it in the morning, then you may take this medicine with a sip of water.    Cholesterol lowering meds     Medication Name     rosuvastatin (CRESTOR) 10 MG tablet      Continue to take this medication on your normal schedule.  If this is an oral medication and you take it in the morning, then you may take this medicine with a sip of water.

## 2025-06-30 NOTE — HOME EXERCISE EDUCATION
Program_ID:[id number]   Access Code: [email or web address]  URL: [email or web address]  Date: 06-  Prepared By: Eli Willams    Program Notes      Exercises      - Long Sitting Quad Set with Towel Roll Under Heel - 1-2 x daily -  x weekly - 2 sets - 10 reps - 5 second hold      - Seated Long Arc Quad - 1-2 x daily -  x weekly - 2 sets - 10 reps - 5 second hold      - Supine Heel Slide - 1-2 x daily -  x weekly - 2 sets - 10 reps      - Active Straight Leg Raise with Quad Set - 1-2 x daily -  x weekly - 2 sets - 10 reps      - Sidelying Hip Abduction - 1-2 x daily -  x weekly - 2 sets - 10 reps      - Prone Hip Extension - 1-2 x daily -  x weekly - 2 sets - 10 reps      - Standing Hamstring Stretch with Step - 1-2 x daily -  x weekly - 1 sets - 3 reps - 30 second hold

## 2025-06-30 NOTE — LETTER
2025    Storm Awan MD  [address] [place] [address]    Patient: Dinorah Horta   YOB: 1970   Date of Visit: 2025     Encounter Diagnosis     ICD-10-CM    1. Primary osteoarthritis of left knee  M17.12       2. Chronic pain of left knee  M25.562     G89.29           Dear Dr. Storm Awan MD:    Thank you for your recent referral of Dinorah Horta. Please review the attached evaluation summary from Dinorah's recent visit.     Please verify that you agree with the plan of care by signing the attached order.     If you have any questions or concerns, please do not hesitate to call.     I sincerely appreciate the opportunity to share in the care of one of your patients and hope to have another opportunity to work with you in the near future.       Sincerely,    Eli Willams, PT      Referring Provider:      I certify that I have read the below Plan of Care and certify the need for these services furnished under this plan of treatment while under my care.                    Storm Awan MD  [address] [place] [address]  Via Fax: [phone]          PT Evaluation     Today's date: 2025  Patient name: Dinorah Horta  : 1970  MRN: [id number]  Referring provider: Storm Awan MD  Dx:   Encounter Diagnosis     ICD-10-CM    1. Primary osteoarthritis of left knee  M17.12       2. Chronic pain of left knee  M25.562     G89.29           Start Time: 1600  Stop Time: 1700  Total time in clinic (min): 60 minutes    Assessment  Impairments: impaired balance, impaired physical strength, lacks appropriate home exercise program and pain with function    Assessment details: The patient is a 53 yo female who presents to physical therapy with signs and symptoms consistent with degenerative changes in the L knee. She is scheduled for a L TKR on 25 with Dr. Awan. Current L knee AROM is 0-130 degrees. L knee strength is grossly 4/5. She currently has pain with  squatting, kneeling and stairs.   Discussed DOS and patient's questions were answered.  Post-operative pain management expectations discussed. Post-operative gait training for level ground, steps and car transfers were performed. Patient demonstrated competence with immediate post-operative home exercise program.   .   Understanding of Dx/Px/POC: good     Prognosis: good    Goals  STG(6 weeks):             1. Independent with HEP            2. Decrease pain to 4/10 at worst with functional activities            3. Increase AROM L knee to 0-90 degrees            4. Increase strength LLE by 1/2 MMT grade            5. Patient will ambulate household distances with lest restrictive AD     LTG(12 weeks):              1. Decrease L knee pain to 1-2/10 at worst with functional activities             2. Increase LLE strength to 4+ to 5/5             3. L knee AROM 0-125 degrees             4. Independent community ambulation without AD             5. Patient will negotiate a flight of stairs reciprocally             6. Return to PLOF       Plan  Patient would benefit from: skilled physical therapy  Planned modality interventions: cryotherapy    Planned therapy interventions: abdominal trunk stabilization, manual therapy, balance, neuromuscular re-education, strengthening, stretching, therapeutic activities, therapeutic exercise and home exercise program    Frequency: 2-3x week  Duration in weeks: 12  Plan of Care beginning date: 6/30/2025  Plan of Care expiration date: 9/22/2025  Treatment plan discussed with: patient  Plan details: Patient will be seen for 1 more pre-op visit and will be reassessed after surgery on 7-10-25.        Subjective Evaluation    History of Present Illness  Mechanism of injury: The patient reports a several year history of B knee pain, L>R. She received several injections which became less effective. She is scheduled for a L TKR on 7-7-25 with Dr. Awan. She has difficulty squatting, getting up  "from the floor or chair. She has pain with walking. She negotiates stairs non-reciprocally when the pain is bad. She is referred to [facility]PP[facility] for 2 sessions of pre-op instructions and exercise.           Not a recurrent problem   Quality of life: good    Patient Goals  Patient goal: Be able to get up and down, walk distances, do stairs normally  Pain  Current pain ratin  At best pain ratin  At worst pain ratin  Location: medial and posterior L knee  Quality: dull ache  Relieving factors: medications and ice  Aggravating factors: walking, standing and stair climbing    Social Support  Steps to enter house: yes  Stairs in house: yes   Lives in: multiple-level home  Lives with: spouse    Employment status: working ()  Treatments  Previous treatment: injection treatment        Objective     Active Range of Motion   Left Knee   Flexion: 130 degrees   Extension: 0 degrees     Strength/Myotome Testing     Left Hip   Planes of Motion   Flexion: 4+  Extension: 4  Abduction: 5    Left Knee   Flexion: 4  Extension: 4    Right Knee   Flexion: 4  Extension: 4+    Left Ankle/Foot   Normal strength             Precautions: N/A  HEP issued. Diagnosis, prognosis and PT POC discussed with patient                   Manuals                                                    Neuro Re-Ed                          TKE                                                                              Ther Ex             QS 5\"x10            Heelslide 10x            SLR 3 way 10x ea            LAQ 5\"x10            HS stretch 30\"x3 LLE on 8\" step            Calf stretch             Bridging             NuStep for knee mobility and ms endurance             Ther Activity                                       Gait Training                                       Modalities                                                          "

## 2025-06-30 NOTE — HOME EXERCISE EDUCATION
Program_ID:[id number]   Access Code: [email or web address]  URL: [email or web address]  Date: 06-  Prepared By: Eli Willams    Program Notes      Exercises      - Long Sitting Quad Set with Towel Roll Under Heel - 1-2 x daily -  x weekly - 2 sets - 10 reps - 5 second hold      - Seated Long Arc Quad - 1-2 x daily -  x weekly - 2 sets - 10 reps - 5 second hold      - Supine Heel Slide - 1-2 x daily -  x weekly - 2 sets - 10 reps      - Active Straight Leg Raise with Quad Set - 1-2 x daily -  x weekly - 2 sets - 10 reps      - Sidelying Hip Abduction - 1-2 x daily -  x weekly - 2 sets - 10 reps      - Prone Hip Extension - 1-2 x daily -  x weekly - 2 sets - 10 reps      - Standing Hamstring Stretch with Step - 1-2 x daily -  x weekly - 1 sets - 3 reps - 30 second hold

## 2025-06-30 NOTE — PROGRESS NOTES
PT Evaluation     Today's date: 2025  Patient name: Dinorah Horta  : 1970  MRN: [id number]  Referring provider: Storm Awan MD  Dx:   Encounter Diagnosis     ICD-10-CM    1. Primary osteoarthritis of left knee  M17.12       2. Chronic pain of left knee  M25.562     G89.29           Start Time: 1600  Stop Time: 1700  Total time in clinic (min): 60 minutes    Assessment  Impairments: impaired balance, impaired physical strength, lacks appropriate home exercise program and pain with function    Assessment details: The patient is a 55 yo female who presents to physical therapy with signs and symptoms consistent with degenerative changes in the L knee. She is scheduled for a L TKR on 25 with Dr. Awan. Current L knee AROM is 0-130 degrees. L knee strength is grossly 4/5. She currently has pain with squatting, kneeling and stairs.   Discussed DOS and patient's questions were answered.  Post-operative pain management expectations discussed. Post-operative gait training for level ground, steps and car transfers were performed. Patient demonstrated competence with immediate post-operative home exercise program.   .   Understanding of Dx/Px/POC: good     Prognosis: good    Goals  STG(6 weeks):             1. Independent with HEP            2. Decrease pain to 4/10 at worst with functional activities            3. Increase AROM L knee to 0-90 degrees            4. Increase strength LLE by 1/2 MMT grade            5. Patient will ambulate household distances with lest restrictive AD     LTG(12 weeks):              1. Decrease L knee pain to 1-2/10 at worst with functional activities             2. Increase LLE strength to 4+ to 5/5             3. L knee AROM 0-125 degrees             4. Independent community ambulation without AD             5. Patient will negotiate a flight of stairs reciprocally             6. Return to PLOF       Plan  Patient would benefit from: skilled physical therapy  Planned modality  interventions: cryotherapy    Planned therapy interventions: abdominal trunk stabilization, manual therapy, balance, neuromuscular re-education, strengthening, stretching, therapeutic activities, therapeutic exercise and home exercise program    Frequency: 2-3x week  Duration in weeks: 12  Plan of Care beginning date: 2025  Plan of Care expiration date: 2025  Treatment plan discussed with: patient  Plan details: Patient will be seen for 1 more pre-op visit and will be reassessed after surgery on 7-10-25.        Subjective Evaluation    History of Present Illness  Mechanism of injury: The patient reports a several year history of B knee pain, L>R. She received several injections which became less effective. She is scheduled for a L TKR on 25 with Dr. Awan. She has difficulty squatting, getting up from the floor or chair. She has pain with walking. She negotiates stairs non-reciprocally when the pain is bad. She is referred to [facility]PP[facility] for 2 sessions of pre-op instructions and exercise.           Not a recurrent problem   Quality of life: good    Patient Goals  Patient goal: Be able to get up and down, walk distances, do stairs normally  Pain  Current pain ratin  At best pain ratin  At worst pain ratin  Location: medial and posterior L knee  Quality: dull ache  Relieving factors: medications and ice  Aggravating factors: walking, standing and stair climbing    Social Support  Steps to enter house: yes  Stairs in house: yes   Lives in: multiple-level home  Lives with: spouse    Employment status: working ()  Treatments  Previous treatment: injection treatment        Objective     Active Range of Motion   Left Knee   Flexion: 130 degrees   Extension: 0 degrees     Strength/Myotome Testing     Left Hip   Planes of Motion   Flexion: 4+  Extension: 4  Abduction: 5    Left Knee   Flexion: 4  Extension: 4    Right Knee   Flexion: 4  Extension: 4+    Left Ankle/Foot   Normal  "strength             Precautions: N/A  HEP issued. Diagnosis, prognosis and PT POC discussed with patient       6/30            Manuals                                                    Neuro Re-Ed                          TKE                                                                              Ther Ex             QS 5\"x10            Heelslide 10x            SLR 3 way 10x ea            LAQ 5\"x10            HS stretch 30\"x3 LLE on 8\" step            Calf stretch             Bridging             NuStep for knee mobility and ms endurance             Ther Activity                                       Gait Training                                       Modalities                                            "

## 2025-06-30 NOTE — TELEPHONE ENCOUNTER
Linda from [facility] called and said they didn't receive the clearance form note and EKG from the pt's pre op on 6/23.  She's asking this be faxed to her to fax# [phone].

## 2025-07-03 ENCOUNTER — OFFICE VISIT (OUTPATIENT)
Dept: PHYSICAL THERAPY | Facility: CLINIC | Age: 55
End: 2025-07-03
Payer: COMMERCIAL

## 2025-07-03 DIAGNOSIS — M25.562 CHRONIC PAIN OF LEFT KNEE: ICD-10-CM

## 2025-07-03 DIAGNOSIS — G89.29 CHRONIC PAIN OF LEFT KNEE: ICD-10-CM

## 2025-07-03 DIAGNOSIS — M17.12 PRIMARY OSTEOARTHRITIS OF LEFT KNEE: Primary | ICD-10-CM

## 2025-07-03 PROCEDURE — 97110 THERAPEUTIC EXERCISES: CPT

## 2025-07-03 NOTE — PROGRESS NOTES
"Daily Note     Today's date: 7/3/2025  Patient name: Dinorah Horta  : 1970  MRN: [id number]  Referring provider: Storm Awan MD  Dx:   Encounter Diagnosis     ICD-10-CM    1. Primary osteoarthritis of left knee  M17.12       2. Chronic pain of left knee  M25.562     G89.29                      Subjective: Patient reports that her L knee does not have pain unless she is not wearing her brace.       Objective: See treatment diary below.       Assessment: Tolerated treatment well. Patient would benefit from continued PT to improve L knee ROM, strength, and stability. Weakness present in L knee musculature at this time. She has L TKA 25.       Plan: Continue per plan of care.      Precautions: N/A  HEP issued. Diagnosis, prognosis and PT POC discussed with patient       6/30 7/3       Manuals                                    Neuro Re-Ed         Tandem   On firm  30\"x2 ea       SLS         TKE                                             Ther Ex         QS 5\"x10 5\"x20       Heelslide 10x 10\"x10 strap  10\"x10 actve       SLR 3 way 10x ea 2x10 ea       LAQ 5\"x10 5\"x20       HS stretch 30\"x3 LLE on 8\" step 30\"x3 LLE on 8\" step       Calf stretch         Bridging         NuStep for knee mobility and ms endurance  L4 10'       Ther Activity         STS         Steps F         Steps L         Gait Training                           Modalities                                "

## 2025-07-10 ENCOUNTER — OFFICE VISIT (OUTPATIENT)
Dept: PHYSICAL THERAPY | Facility: CLINIC | Age: 55
End: 2025-07-10
Payer: COMMERCIAL

## 2025-07-10 DIAGNOSIS — G89.29 CHRONIC PAIN OF LEFT KNEE: ICD-10-CM

## 2025-07-10 DIAGNOSIS — M25.562 CHRONIC PAIN OF LEFT KNEE: ICD-10-CM

## 2025-07-10 DIAGNOSIS — M17.12 PRIMARY OSTEOARTHRITIS OF LEFT KNEE: Primary | ICD-10-CM

## 2025-07-10 DIAGNOSIS — Z96.652 S/P TKR (TOTAL KNEE REPLACEMENT), LEFT: ICD-10-CM

## 2025-07-10 PROCEDURE — 97535 SELF CARE MNGMENT TRAINING: CPT | Performed by: PHYSICAL THERAPIST

## 2025-07-10 PROCEDURE — 97164 PT RE-EVAL EST PLAN CARE: CPT | Performed by: PHYSICAL THERAPIST

## 2025-07-10 PROCEDURE — 97110 THERAPEUTIC EXERCISES: CPT | Performed by: PHYSICAL THERAPIST

## 2025-07-10 NOTE — PROGRESS NOTES
PT Re-Evaluation     Today's date: 7/10/2025  Patient name: Dinorah Horta  : 1970  MRN: [id number]  Referring provider: Storm Awan MD  Dx:   Encounter Diagnosis     ICD-10-CM    1. Primary osteoarthritis of left knee  M17.12       2. Chronic pain of left knee  M25.562     G89.29       3. S/P TKR (total knee replacement), left  Z96.652           Start Time: 0900  Stop Time: 945  Total time in clinic (min): 45 minutes    Assessment  Impairments: abnormal gait, abnormal or restricted ROM, impaired balance, impaired physical strength, lacks appropriate home exercise program and pain with function    Assessment details: The patient is s/p L TKR on 25 with Dr. Awan. She is ambulating with a RW independently. Current L knee AROM -6-83 degrees. Quad contraction is Fair. She requires assistance with ADLs and with lifting her L leg into a vehicle or bed. Deficits are noted in LLE ROM, strength and stability. She is unable to negotiate stairs. She would benefit from continued physical therapy to address deficits and to improve functional mobility.     Goals  STG(6 weeks):             1. Independent with HEP ONGOING            2. Decrease pain to 4/10 at worst with functional activities ONGOING            3. Increase AROM L knee to 0-90 degrees  ONGOING            4. Increase strength LLE by 1/2 MMT grade  ONGOING            5. Patient will ambulate household distances with lest restrictive AD  ONGOING     LTG(12 weeks): ONGOING             1. Decrease L knee pain to 1-2/10 at worst with functional activities             2. Increase LLE strength to 4+ to 5/5             3. L knee AROM 0-125 degrees             4. Independent community ambulation without AD             5. Patient will negotiate a flight of stairs reciprocally             6. Return to PLOF       Plan  Patient would benefit from: skilled physical therapy  Planned modality interventions: cryotherapy    Planned therapy interventions: abdominal trunk  stabilization, manual therapy, balance, neuromuscular re-education, strengthening, stretching, therapeutic activities, therapeutic exercise and home exercise program    Frequency: 2-3x week  Duration in weeks: 12  Plan of Care beginning date: 6/30/2025  Plan of Care expiration date: 9/22/2025  Treatment plan discussed with: patient        Subjective Evaluation    History of Present Illness  Mechanism of injury: The patient reports a several year history of B knee pain, L>R. She received several injections which became less effective. She is scheduled for a L TKR on 7-7-25 with Dr. Awan. She has difficulty squatting, getting up from the floor or chair. She has pain with walking. She negotiates stairs non-reciprocally when the pain is bad. She is referred to [facility] for 2 sessions of pre-op instructions and exercise.     UPDATE 7/10/25:  Patient is s/p L TKR on 7-7-25.  She was discharged home the following day. Patient reports 4/10 at present. She is walking with a RW. She is using OxyContin. She was nauseous from the medication so she has tried to decrease her dosage. She has been sleeping on a recliner. She is using ice frequently. She has a bike at home that she started yesterday.   Patient Goals  Patient goal: Be able to get up and down, walk distances, do stairs normally  Social Support  Steps to enter house: yes  Stairs in house: yes   Lives in: multiple-level home  Lives with: spouse    Employment status: working ()  Treatments  Previous treatment: injection treatment        Objective     Neurological Testing     Sensation     Knee   Left Knee   Intact: Light touch    Active Range of Motion   Left Knee   Flexion: 83 degrees   Extension: -6 degrees     Strength/Myotome Testing     Left Hip   Planes of Motion   Flexion: 3  Abduction: 3    Left Knee   Flexion: 3+  Extension: 3    Right Knee   Flexion: 4  Extension: 4+    Left Ankle/Foot   Normal strength    Ambulation     Ambulation: Level  "Surfaces   Ambulation with assistive device: independent (RW)             Precautions: N/A  HEP issued. Diagnosis, prognosis and PT POC discussed with patient       6/30 7/3 7/10      Manuals                  Assessment   JM               Neuro Re-Ed         Tandem   On firm  30\"x2 ea       SLS         TKE                                    Ther Ex         Seated hip flex   10x      QS 5\"x10 5\"x20 5\"x10      Heelslide 10x 10\"x10 strap  10\"x10 actve 5\"x10 AROM      Sliding abd   10x      SAQ   5\"x10      Heel slide in sitting   10\"X10      SLR 3 way 10x ea 2x10 ea       LAQ 5\"x10 5\"x20       HS stretch 30\"x3 LLE on 8\" step 30\"x3 LLE on 8\" step       Calf stretch   30\"x w/ strap in supine      Bridging         NuStep for knee mobility and ms endurance  L4 10'       Ther Activity         STS   5x from high table      Steps F         Steps L         Gait Training                           Modalities         CP   10' L knee post exer                    "

## 2025-07-10 NOTE — LETTER
July 10, 2025    Storm Awan MD  [address] [place] [id number]    Patient: Dinorah Horta   YOB: 1970   Date of Visit: 7/10/2025     Encounter Diagnosis     ICD-10-CM    1. Primary osteoarthritis of left knee  M17.12       2. Chronic pain of left knee  M25.562     G89.29       3. S/P TKR (total knee replacement), left  Z96.652           Dear Dr. Storm Awan MD:    Thank you for your recent referral of Dinorah Horta. Please review the attached evaluation summary from Dinorah's recent visit.     Please verify that you agree with the plan of care by signing the attached order.     If you have any questions or concerns, please do not hesitate to call.     I sincerely appreciate the opportunity to share in the care of one of your patients and hope to have another opportunity to work with you in the near future.       Sincerely,    Eli Willams, PT      Referring Provider:      I certify that I have read the below Plan of Care and certify the need for these services furnished under this plan of treatment while under my care.                    Storm Awan MD  [address] [place] [address]  Via Fax: [phone]          PT Re-Evaluation     Today's date: 7/10/2025  Patient name: Dinorah Horta  : 1970  MRN: [id number]  Referring provider: Storm Awan MD  Dx:   Encounter Diagnosis     ICD-10-CM    1. Primary osteoarthritis of left knee  M17.12       2. Chronic pain of left knee  M25.562     G89.29       3. S/P TKR (total knee replacement), left  Z96.652           Start Time: 0900  Stop Time: 0945  Total time in clinic (min): 45 minutes    Assessment  Impairments: abnormal gait, abnormal or restricted ROM, impaired balance, impaired physical strength, lacks appropriate home exercise program and pain with function    Assessment details: The patient is s/p L TKR on 25 with Dr. Awan. She is ambulating with a RW independently. Current L knee AROM -6-83 degrees.  Quad contraction is Fair. She requires assistance with ADLs and with lifting her L leg into a vehicle or bed. Deficits are noted in LLE ROM, strength and stability. She is unable to negotiate stairs. She would benefit from continued physical therapy to address deficits and to improve functional mobility.     Goals  STG(6 weeks):             1. Independent with HEP ONGOING            2. Decrease pain to 4/10 at worst with functional activities ONGOING            3. Increase AROM L knee to 0-90 degrees  ONGOING            4. Increase strength LLE by 1/2 MMT grade  ONGOING            5. Patient will ambulate household distances with lest restrictive AD  ONGOING     LTG(12 weeks): ONGOING             1. Decrease L knee pain to 1-2/10 at worst with functional activities             2. Increase LLE strength to 4+ to 5/5             3. L knee AROM 0-125 degrees             4. Independent community ambulation without AD             5. Patient will negotiate a flight of stairs reciprocally             6. Return to PLOF       Plan  Patient would benefit from: skilled physical therapy  Planned modality interventions: cryotherapy    Planned therapy interventions: abdominal trunk stabilization, manual therapy, balance, neuromuscular re-education, strengthening, stretching, therapeutic activities, therapeutic exercise and home exercise program    Frequency: 2-3x week  Duration in weeks: 12  Plan of Care beginning date: 6/30/2025  Plan of Care expiration date: 9/22/2025  Treatment plan discussed with: patient        Subjective Evaluation    History of Present Illness  Mechanism of injury: The patient reports a several year history of B knee pain, L>R. She received several injections which became less effective. She is scheduled for a L TKR on 7-7-25 with Dr. Awan. She has difficulty squatting, getting up from the floor or chair. She has pain with walking. She negotiates stairs non-reciprocally when the pain is bad. She is  "referred to [facility]PPT for 2 sessions of pre-op instructions and exercise.     UPDATE 7/10/25:  Patient is s/p L TKR on 7-7-25.  She was discharged home the following day. Patient reports 4/10 at present. She is walking with a RW. She is using OxyContin. She was nauseous from the medication so she has tried to decrease her dosage. She has been sleeping on a recliner. She is using ice frequently. She has a bike at home that she started yesterday.   Patient Goals  Patient goal: Be able to get up and down, walk distances, do stairs normally  Social Support  Steps to enter house: yes  Stairs in house: yes   Lives in: multiple-level home  Lives with: spouse    Employment status: working ()  Treatments  Previous treatment: injection treatment        Objective     Neurological Testing     Sensation     Knee   Left Knee   Intact: Light touch    Active Range of Motion   Left Knee   Flexion: 83 degrees   Extension: -6 degrees     Strength/Myotome Testing     Left Hip   Planes of Motion   Flexion: 3  Abduction: 3    Left Knee   Flexion: 3+  Extension: 3    Right Knee   Flexion: 4  Extension: 4+    Left Ankle/Foot   Normal strength    Ambulation     Ambulation: Level Surfaces   Ambulation with assistive device: independent (RW)             Precautions: N/A  HEP issued. Diagnosis, prognosis and PT POC discussed with patient       6/30 7/3 7/10      Manuals                  Assessment   JM               Neuro Re-Ed         Tandem   On firm  30\"x2 ea       SLS         TKE                                    Ther Ex         Seated hip flex   10x      QS 5\"x10 5\"x20 5\"x10      Heelslide 10x 10\"x10 strap  10\"x10 actve 5\"x10 AROM      Sliding abd   10x      SAQ   5\"x10      Heel slide in sitting   10\"X10      SLR 3 way 10x ea 2x10 ea       LAQ 5\"x10 5\"x20       HS stretch 30\"x3 LLE on 8\" step 30\"x3 LLE on 8\" step       Calf stretch   30\"x w/ strap in supine      Bridging         NuStep for knee mobility and ms endurance  " L4 10'       Ther Activity         STS   5x from high table      Steps F         Steps L         Gait Training                           Modalities         CP   10' L knee post exer

## 2025-07-14 ENCOUNTER — OFFICE VISIT (OUTPATIENT)
Dept: PHYSICAL THERAPY | Facility: CLINIC | Age: 55
End: 2025-07-14
Payer: COMMERCIAL

## 2025-07-14 DIAGNOSIS — M25.562 CHRONIC PAIN OF LEFT KNEE: ICD-10-CM

## 2025-07-14 DIAGNOSIS — M17.12 PRIMARY OSTEOARTHRITIS OF LEFT KNEE: Primary | ICD-10-CM

## 2025-07-14 DIAGNOSIS — Z96.652 S/P TKR (TOTAL KNEE REPLACEMENT), LEFT: ICD-10-CM

## 2025-07-14 DIAGNOSIS — G89.29 CHRONIC PAIN OF LEFT KNEE: ICD-10-CM

## 2025-07-14 PROCEDURE — 97110 THERAPEUTIC EXERCISES: CPT

## 2025-07-14 NOTE — PROGRESS NOTES
"Daily Note     Today's date: 2025  Patient name: Dinorah Horta  : 1970  MRN: [id number]  Referring provider: Storm Awan MD  Dx:   Encounter Diagnosis     ICD-10-CM    1. Primary osteoarthritis of left knee  M17.12       2. Chronic pain of left knee  M25.562     G89.29       3. S/P TKR (total knee replacement), left  Z96.652                      Subjective: Patient reports that her L knee has more edema today. She has more soreness.      Objective: See treatment diary below.      Assessment:  Tolerated treatment well. Patient would benefit from continued PT to improve L knee ROM, strength, and stability for functionality. Patient unable to lift her L LE actively at this time, but quad muscle is palpable. Progress to tolerance.        Plan: Continue per plan of care.      Precautions: N/A  HEP issued. Diagnosis, prognosis and PT POC discussed with patient       6/30 7/3 7/10 7/14     Manuals                  Assessment   JM               Neuro Re-Ed         Tandem   On firm  30\"x2 ea       SLS         TKE                                    Ther Ex         Seated hip flex   10x 2x10     QS 5\"x10 5\"x20 5\"x10 5\"x10 ea towel under heel/knee     Heelslide 10x 10\"x10 strap  10\"x10 actve 5\"x10 AROM Strap 10\"x10     Sliding abd   10x 2x10     SAQ   5\"x10 AAROM 10x     Heel slide in sitting   10\"X10 On AE 10\"x10 ea AROM/Strap     SLR 3 way 10x ea 2x10 ea       LAQ 5\"x10 5\"x20       HS stretch 30\"x3 LLE on 8\" step 30\"x3 LLE on 8\" step       Calf stretch   30\"x w/ strap in supine 30\"x w/ strap in supine     Bridging         NuStep for knee mobility and ms endurance  L4 10'  L3 10'     Ther Activity         STS   5x from high table 2x5 from plinth     Steps F         Steps L         Gait Training                           Modalities         CP   10' L knee post exer 10' L knee post exer                   "

## 2025-07-16 ENCOUNTER — OFFICE VISIT (OUTPATIENT)
Dept: PHYSICAL THERAPY | Facility: CLINIC | Age: 55
End: 2025-07-16
Payer: COMMERCIAL

## 2025-07-16 DIAGNOSIS — Z96.652 S/P TKR (TOTAL KNEE REPLACEMENT), LEFT: ICD-10-CM

## 2025-07-16 DIAGNOSIS — G89.29 CHRONIC PAIN OF LEFT KNEE: ICD-10-CM

## 2025-07-16 DIAGNOSIS — M17.12 PRIMARY OSTEOARTHRITIS OF LEFT KNEE: Primary | ICD-10-CM

## 2025-07-16 DIAGNOSIS — M25.562 CHRONIC PAIN OF LEFT KNEE: ICD-10-CM

## 2025-07-16 PROCEDURE — 97530 THERAPEUTIC ACTIVITIES: CPT

## 2025-07-16 PROCEDURE — 97110 THERAPEUTIC EXERCISES: CPT

## 2025-07-16 NOTE — PROGRESS NOTES
"Daily Note     Today's date: 2025  Patient name: Dinorah Horta  : 1970  MRN: [id number]  Referring provider: Storm Awan MD  Dx:   Encounter Diagnosis     ICD-10-CM    1. Primary osteoarthritis of left knee  M17.12       2. Chronic pain of left knee  M25.562     G89.29       3. S/P TKR (total knee replacement), left  Z96.652                      Subjective: Patient reports that her L knee has less pain, but she took her pain medication before today's session.       Objective: See treatment diary below. R knee flexion PROM 94*, AROM ext 4*.      Assessment:  Tolerated treatment well. Patient would benefit from continued PT to improve L knee ROM, strength, and stability for functionality. Patient was able to tolerate active SAQ today. She tolerated standing HR/TR. ROM is improving per session. Progress to tolerance.       Plan: Continue per plan of care.      Precautions: N/A  HEP issued. Diagnosis, prognosis and PT POC discussed with patient       6/30 7/3 7/10 7/14 7/16    Manuals                  Assessment   JM               Neuro Re-Ed         Tandem   On firm  30\"x2 ea       SLS         TKE                                    Ther Ex         Seated hip flex   10x 2x10 2x10     QS 5\"x10 5\"x20 5\"x10 5\"x10 ea towel under heel/knee 5\"x20 ea towel under heel    Heelslide 10x 10\"x10 strap  10\"x10 actve 5\"x10 AROM Strap 10\"x10 Strap 10\"x10    Sliding abd   10x 2x10     SAQ   5\"x10 AAROM 10x Active 5\"x10    Heel slide in sitting   10\"X10 On AE 10\"x10 ea AROM/Strap On AE 10\"x10 ea AROM/Strap    SLR 3 way 10x ea 2x10 ea       LAQ 5\"x10 5\"x20       HS stretch 30\"x3 LLE on 8\" step 30\"x3 LLE on 8\" step   Sitting 8\" step 30\"x3    Calf stretch   30\"x w/ strap in supine 30\"x w/ strap in supine 30\"x2 supine w/ strap    HR/TR     Standing 20x    NuStep for knee mobility and ms endurance  L4 10'  L3 10' L4 10'    Ther Activity         STS   5x from high table 2x5 from plinth 3x5 from plinth    Steps F         Steps L   "       Gait Training                           Modalities         CP   10' L knee post exer 10' L knee post exer 6' L knee post exer in ext

## 2025-07-17 ENCOUNTER — OFFICE VISIT (OUTPATIENT)
Dept: PHYSICAL THERAPY | Facility: CLINIC | Age: 55
End: 2025-07-17
Payer: COMMERCIAL

## 2025-07-17 DIAGNOSIS — M25.562 CHRONIC PAIN OF LEFT KNEE: ICD-10-CM

## 2025-07-17 DIAGNOSIS — Z96.652 S/P TKR (TOTAL KNEE REPLACEMENT), LEFT: ICD-10-CM

## 2025-07-17 DIAGNOSIS — G89.29 CHRONIC PAIN OF LEFT KNEE: ICD-10-CM

## 2025-07-17 DIAGNOSIS — M17.12 PRIMARY OSTEOARTHRITIS OF LEFT KNEE: Primary | ICD-10-CM

## 2025-07-17 PROCEDURE — 97110 THERAPEUTIC EXERCISES: CPT

## 2025-07-17 NOTE — PROGRESS NOTES
"Daily Note     Today's date: 2025  Patient name: Dinorah Horta  : 1970  MRN: [id number]  Referring provider: Storm Awan MD  Dx:   Encounter Diagnosis     ICD-10-CM    1. Primary osteoarthritis of left knee  M17.12       2. Chronic pain of left knee  M25.562     G89.29       3. S/P TKR (total knee replacement), left  Z96.652                      Subjective: Patient reports that she is very sore in her L knee today.       Objective: See treatment diary below. PROM L knee ext 4*.      Assessment: Tolerated treatment well. Patient would benefit from continued PT to improve L knee ROM, strength, and stability for functionality. Patient was able to tolerate AAROM SLR today. ROM and tolerance is improving per session. Progress to tolerance.       Plan: Continue per plan of care.      Precautions: N/A  HEP issued. Diagnosis, prognosis and PT POC discussed with patient       6/30 7/3 7/10 7/14 7/16 7/17   Manuals                  Assessment   JM               Neuro Re-Ed         Tandem   On firm  30\"x2 ea       SLS         TKE                                    Ther Ex         Seated hip flex   10x 2x10 2x10  Progressed   QS 5\"x10 5\"x20 5\"x10 5\"x10 ea towel under heel/knee 5\"x20 ea towel under heel 5\"x20 ea towel under heel   Heelslide 10x 10\"x10 strap  10\"x10 actve 5\"x10 AROM Strap 10\"x10 Strap 10\"x10 Strap 10\"x10   Sliding abd   10x 2x10     SAQ   5\"x10 AAROM 10x Active 5\"x10 Active 3\"x10   Heel slide in sitting   10\"X10 On AE 10\"x10 ea AROM/Strap On AE 10\"x10 ea AROM/Strap NV   SLR 3 way 10x ea 2x10 ea    SLR AAROM 2x5   LAQ 5\"x10 5\"x20       HS stretch 30\"x3 LLE on 8\" step 30\"x3 LLE on 8\" step   Sitting 8\" step 30\"x3 Standing 8\" step 30\"x3   Calf stretch   30\"x w/ strap in supine 30\"x w/ strap in supine 30\"x2 supine w/ strap 30\"x2 supine w/ strap   Standing abd/ext         Standing March      Standing 2x10   HR/TR     Standing 20x Standing 2x10   NuStep for knee mobility and ms endurance S:9  L4 10'  L3 10' " L4 10' L5 10'   Ther Activity         STS   5x from high table 2x5 from plinth 3x5 from plinth 3x5 from plint   Steps F         Steps L         Gait Training                           Modalities         CP   10' L knee post exer 10' L knee post exer 6' L knee post exer in ext 10' L knee post exer

## 2025-07-21 ENCOUNTER — OFFICE VISIT (OUTPATIENT)
Dept: PHYSICAL THERAPY | Facility: CLINIC | Age: 55
End: 2025-07-21
Payer: COMMERCIAL

## 2025-07-21 DIAGNOSIS — G89.29 CHRONIC PAIN OF LEFT KNEE: ICD-10-CM

## 2025-07-21 DIAGNOSIS — M17.12 PRIMARY OSTEOARTHRITIS OF LEFT KNEE: Primary | ICD-10-CM

## 2025-07-21 DIAGNOSIS — Z96.652 S/P TKR (TOTAL KNEE REPLACEMENT), LEFT: ICD-10-CM

## 2025-07-21 DIAGNOSIS — M25.562 CHRONIC PAIN OF LEFT KNEE: ICD-10-CM

## 2025-07-21 PROCEDURE — 97110 THERAPEUTIC EXERCISES: CPT | Performed by: PHYSICAL THERAPIST

## 2025-07-21 PROCEDURE — 97530 THERAPEUTIC ACTIVITIES: CPT | Performed by: PHYSICAL THERAPIST

## 2025-07-21 NOTE — PROGRESS NOTES
"Daily Note     Today's date: 2025  Patient name: Dinorah Horta  : 1970  MRN: [id number]  Referring provider: Storm Awan MD  Dx:   Encounter Diagnosis     ICD-10-CM    1. Primary osteoarthritis of left knee  M17.12       2. Chronic pain of left knee  M25.562     G89.29       3. S/P TKR (total knee replacement), left  Z96.652           Start Time: 0900  Stop Time: 1000  Total time in clinic (min): 60 minutes    Subjective: The patient states that she was walking with a SPC in her house.      Objective: See treatment diary below      Assessment: Closed chain exercises added to program today to improve L weight shift and LLE weight bearing.  Gait training with SPC in clinic today. Patient able to demonstrates a grossly normal gait pattern. L knee ROM continues to improve. Tolerated treatment well. Patient would benefit from continued PT      Plan: Continue per plan of care.      Precautions: N/A  HEP issued. Diagnosis, prognosis and PT POC discussed with patient       7/21 7/3 7/10 7/14 7/16 7/17   Manuals                  Assessment   JM               Neuro Re-Ed         Tandem   On firm  30\"x2 ea       SLS         TKE                                    Ther Ex         Seated hip flex   10x 2x10 2x10  Progressed   QS 5\"x20 ea towel under heel 5\"x20 5\"x10 5\"x10 ea towel under heel/knee 5\"x20 ea towel under heel 5\"x20 ea towel under heel   Heelslide Strap 10\"x10  5\"x10 AROM 10\"x10 strap  10\"x10 actve 5\"x10 AROM Strap 10\"x10 Strap 10\"x10 Strap 10\"x10   Sliding abd   10x 2x10     SAQ 5\" hold 2x10  5\"x10 AAROM 10x Active 5\"x10 Active 3\"x10   Heel slide in sitting   10\"X10 On AE 10\"x10 ea AROM/Strap On AE 10\"x10 ea AROM/Strap NV   SLR 3 way  2x10 ea    SLR AAROM 2x5   LAQ 5\"x10 5\"x20       HS stretch 30\"x3 LLE on 8\" step Knee flex stretch 30\"x3 30\"x3 LLE on 8\" step     Sitting 8\" step 30\"x3 Standing 8\" step 30\"x3   Calf stretch 30\"x2 supine w/ strap  30\"x w/ strap in supine 30\"x w/ strap in supine 30\"x2 supine " "w/ strap 30\"x2 supine w/ strap   Standing abd/ext 10x ea BLE        Standing March 10x ea BLE     Standing 2x10   HR/TR 20x    Standing 20x Standing 2x10   NuStep for knee mobility and ms endurance S:9 L5 10' L4 10'  L3 10' L4 10' L5 10'   Ther Activity         STS 3x5 chair+AE  5x from high table 2x5 from plinth 3x5 from plinth 3x5 from plinth   Steps F NV        Steps L         Gait Training          75' w/ SPC                 Modalities         CP 10' L knee post exer  10' L knee post exer 10' L knee post exer 6' L knee post exer in ext 10' L knee post exer                       "

## 2025-07-23 ENCOUNTER — OFFICE VISIT (OUTPATIENT)
Dept: PHYSICAL THERAPY | Facility: CLINIC | Age: 55
End: 2025-07-23
Payer: COMMERCIAL

## 2025-07-23 DIAGNOSIS — M25.562 CHRONIC PAIN OF LEFT KNEE: ICD-10-CM

## 2025-07-23 DIAGNOSIS — G89.29 CHRONIC PAIN OF LEFT KNEE: ICD-10-CM

## 2025-07-23 DIAGNOSIS — Z96.652 S/P TKR (TOTAL KNEE REPLACEMENT), LEFT: ICD-10-CM

## 2025-07-23 DIAGNOSIS — M17.12 PRIMARY OSTEOARTHRITIS OF LEFT KNEE: Primary | ICD-10-CM

## 2025-07-23 PROCEDURE — 97112 NEUROMUSCULAR REEDUCATION: CPT

## 2025-07-23 PROCEDURE — 97110 THERAPEUTIC EXERCISES: CPT

## 2025-07-23 PROCEDURE — 97530 THERAPEUTIC ACTIVITIES: CPT

## 2025-07-23 NOTE — PROGRESS NOTES
"Daily Note     Today's date: 2025  Patient name: Dinorah Horta  : 1970  MRN: [id number]  Referring provider: Storm Awan MD  Dx:   Encounter Diagnosis     ICD-10-CM    1. Primary osteoarthritis of left knee  M17.12       2. Chronic pain of left knee  M25.562     G89.29       3. S/P TKR (total knee replacement), left  Z96.652                      Subjective: Patient reports she tried to not take her pain medication, which was a mistake. She still experiences pain in her L knee. She is using the cane full time.       Objective: See treatment diary below. Active L Knee ext 3*, Active L knee flexion: 100*      Assessment: Tolerated treatment well. Patient would benefit from continued PT to improve L knee ROM, strength, and stability for functionality. Patient tolerated 4\" step for step ups today. ROM and strength are improving. Progress to tolerance.          Plan: Continue per plan of care.      Precautions: N/A  HEP issued. Diagnosis, prognosis and PT POC discussed with patient       7/21 7/23 7/10 7/14 7/16 7/17   Manuals                  Assessment   JM               Neuro Re-Ed         Tandem          SLS         TKE                                    Ther Ex         Seated hip flex   10x 2x10 2x10  Progressed   QS 5\"x20 ea towel under heel 5\"x20 ea towel under heel 5\"x10 5\"x10 ea towel under heel/knee 5\"x20 ea towel under heel 5\"x20 ea towel under heel   Heelslide Strap 10\"x10  5\"x10 AROM Strap 10\"x10  5\"x10 AROM 5\"x10 AROM Strap 10\"x10 Strap 10\"x10 Strap 10\"x10   Sliding abd   10x 2x10     SAQ 5\" hold 2x10 5\" holds 2x10 5\"x10 AAROM 10x Active 5\"x10 Active 3\"x10   Heel slide in sitting   10\"X10 On AE 10\"x10 ea AROM/Strap On AE 10\"x10 ea AROM/Strap NV   SLR  NV    SLR AAROM 2x5   LAQ 5\"x10 5\"x10       HS stretch 30\"x3 LLE on 8\" step Knee flex stretch 30\"x3 30\"x3 LLE on 8\" step Knee flex stretch 30\"x3   Sitting 8\" step 30\"x3 Standing 8\" step 30\"x3   Calf stretch 30\"x2 supine w/ strap 30\"x2 supine w/ " "strap 30\"x w/ strap in supine 30\"x w/ strap in supine 30\"x2 supine w/ strap 30\"x2 supine w/ strap   Standing abd/ext 10x ea BLE BLE 10x        Standing March 10x ea BLE BLE 2x10     Standing 2x10   HR/TR 20x 2x10   Standing 20x Standing 2x10   NuStep for knee mobility and ms endurance S:9 L5 10' L5 10'  L3 10' L4 10' L5 10'   Ther Activity         STS 3x5 chair+AE 3x5 chair +AE 5x from high table 2x5 from plinth 3x5 from plinth 3x5 from plinth   Steps F NV 4\" 10x       Steps L         Gait Training          75' w/ SPC Using full time                Modalities         CP 10' L knee post exer 10' L knee post exer 10' L knee post exer 10' L knee post exer 6' L knee post exer in ext 10' L knee post exer                         "

## 2025-07-24 ENCOUNTER — OFFICE VISIT (OUTPATIENT)
Dept: PHYSICAL THERAPY | Facility: CLINIC | Age: 55
End: 2025-07-24
Payer: COMMERCIAL

## 2025-07-24 DIAGNOSIS — M17.12 PRIMARY OSTEOARTHRITIS OF LEFT KNEE: Primary | ICD-10-CM

## 2025-07-24 DIAGNOSIS — M25.562 CHRONIC PAIN OF LEFT KNEE: ICD-10-CM

## 2025-07-24 DIAGNOSIS — Z96.652 S/P TKR (TOTAL KNEE REPLACEMENT), LEFT: ICD-10-CM

## 2025-07-24 DIAGNOSIS — G89.29 CHRONIC PAIN OF LEFT KNEE: ICD-10-CM

## 2025-07-24 PROCEDURE — 97110 THERAPEUTIC EXERCISES: CPT | Performed by: PHYSICAL MEDICINE & REHABILITATION

## 2025-07-24 PROCEDURE — 97112 NEUROMUSCULAR REEDUCATION: CPT | Performed by: PHYSICAL MEDICINE & REHABILITATION

## 2025-07-24 NOTE — PROGRESS NOTES
"Daily Note     Today's date: 2025  Patient name: Dinorah Horta  : 1970  MRN: [id number]  Referring provider: Storm Awan MD  Dx:   Encounter Diagnosis     ICD-10-CM    1. Primary osteoarthritis of left knee  M17.12       2. Chronic pain of left knee  M25.562     G89.29       3. S/P TKR (total knee replacement), left  Z96.652                      Subjective: Notes her knee is sore from just having PT yesterday. No new pain/sx. Does feel she is making progress, just wishes it was faster. Feels she is a little more swollen today.       Objective: See treatment diary below      Assessment: Tolerated treatment well. No adverse reaction to tx. Pt is progressing as expected thus far post TKA. Min swelling L knee. Continues with lack of TKE with stiffness; AAROM -3-4* today. AAROM L knee flexion 100* this date with stiffness/soreness at end range. No present signs/sx of infection. Ambulating with SPC. Added SLR flexion supine; min A required with min-mod quad lag and mm weakness L quad.  No complaints after session.  Patient demonstrated fatigue post treatment and would benefit from continued PT to address cont strength and ROM deficits L thigh/LE impacting mobility and function.      Plan: Continue per plan of care.  Progress treatment as tolerated.       Precautions: N/A  HEP issued. Diagnosis, prognosis and PT POC discussed with patient          Manuals                  Assessment                  Neuro Re-Ed         Tandem          SLS         TKE                                    Ther Ex         Seated hip flex    2x10 2x10  Progressed   QS 5\"x20 ea towel under heel 5\"x20 ea towel under heel 5\"x20 ea towel under heel 5\"x10 ea towel under heel/knee 5\"x20 ea towel under heel 5\"x20 ea towel under heel   Heelslide Strap 10\"x10  5\"x10 AROM Strap 10\"x10  5\"x10 AROM Strap 10\"x10  5\"x10 AROM Strap 10\"x10 Strap 10\"x10 Strap 10\"x10   Sliding abd    2x10     SAQ 5\" hold 2x10 5\" holds " "2x10 5\" holds 2x10 AAROM 10x Active 5\"x10 Active 3\"x10   Heel slide in sitting    On AE 10\"x10 ea AROM/Strap On AE 10\"x10 ea AROM/Strap NV   SLR  NV AAROM 0#   10x cues for QS    SLR AAROM 2x5   LAQ 5\"x10 5\"x10 5\"x 10       HS stretch 30\"x3 LLE on 8\" step Knee flex stretch 30\"x3 30\"x3 LLE on 8\" step Knee flex stretch 30\"x3 30\"x3 LLE on 8\" step Knee flex stretch 30\"x3  Sitting 8\" step 30\"x3 Standing 8\" step 30\"x3   Calf stretch 30\"x2 supine w/ strap 30\"x2 supine w/ strap 30\"x 3 w/ strap in supine 30\"x w/ strap in supine 30\"x2 supine w/ strap 30\"x2 supine w/ strap   Standing abd/ext 10x ea BLE BLE 10x  B LE   2x10 ea       Standing March 10x ea BLE BLE 2x10  BLE 2x10 ea   Standing 2x10   HR/TR 20x 2x10 2x10 ea   Standing 20x Standing 2x10   NuStep for knee mobility and ms endurance S:9 L5 10' L5 10' L5 10'  L3 10' L4 10' L5 10'   Ther Activity         STS 3x5 chair+AE 3x5 chair +AE 3x5 chair +AE 2x5 from plinth 3x5 from plinth 3x5 from plinth   Steps F NV 4\" 10x 4\" 10x      Steps L         Gait Training          75' w/ SPC Using full time                Modalities         CP 10' L knee post exer 10' L knee post exer 10' L knee post exer  No adverse reaction to tx  10' L knee post exer 6' L knee post exer in ext 10' L knee post exer                           "

## 2025-07-28 ENCOUNTER — OFFICE VISIT (OUTPATIENT)
Dept: PHYSICAL THERAPY | Facility: CLINIC | Age: 55
End: 2025-07-28
Payer: COMMERCIAL

## 2025-07-28 DIAGNOSIS — G89.29 CHRONIC PAIN OF LEFT KNEE: ICD-10-CM

## 2025-07-28 DIAGNOSIS — M17.12 PRIMARY OSTEOARTHRITIS OF LEFT KNEE: Primary | ICD-10-CM

## 2025-07-28 DIAGNOSIS — Z96.652 S/P TKR (TOTAL KNEE REPLACEMENT), LEFT: ICD-10-CM

## 2025-07-28 DIAGNOSIS — M25.562 CHRONIC PAIN OF LEFT KNEE: ICD-10-CM

## 2025-07-28 PROCEDURE — 97110 THERAPEUTIC EXERCISES: CPT | Performed by: PHYSICAL THERAPIST

## 2025-07-28 PROCEDURE — 97112 NEUROMUSCULAR REEDUCATION: CPT | Performed by: PHYSICAL THERAPIST

## 2025-07-29 ENCOUNTER — EVALUATION (OUTPATIENT)
Dept: PHYSICAL THERAPY | Facility: CLINIC | Age: 55
End: 2025-07-29
Payer: COMMERCIAL

## 2025-07-29 DIAGNOSIS — Z96.652 S/P TKR (TOTAL KNEE REPLACEMENT), LEFT: ICD-10-CM

## 2025-07-29 DIAGNOSIS — M25.562 CHRONIC PAIN OF LEFT KNEE: ICD-10-CM

## 2025-07-29 DIAGNOSIS — M17.12 PRIMARY OSTEOARTHRITIS OF LEFT KNEE: Primary | ICD-10-CM

## 2025-07-29 DIAGNOSIS — G89.29 CHRONIC PAIN OF LEFT KNEE: ICD-10-CM

## 2025-07-29 PROCEDURE — 97110 THERAPEUTIC EXERCISES: CPT

## 2025-07-29 PROCEDURE — 97530 THERAPEUTIC ACTIVITIES: CPT

## 2025-07-30 ENCOUNTER — APPOINTMENT (OUTPATIENT)
Dept: PHYSICAL THERAPY | Facility: CLINIC | Age: 55
End: 2025-07-30
Payer: COMMERCIAL

## 2025-07-31 ENCOUNTER — OFFICE VISIT (OUTPATIENT)
Dept: PHYSICAL THERAPY | Facility: CLINIC | Age: 55
End: 2025-07-31
Payer: COMMERCIAL

## 2025-07-31 DIAGNOSIS — Z96.652 S/P TKR (TOTAL KNEE REPLACEMENT), LEFT: ICD-10-CM

## 2025-07-31 DIAGNOSIS — M25.562 CHRONIC PAIN OF LEFT KNEE: ICD-10-CM

## 2025-07-31 DIAGNOSIS — M17.12 PRIMARY OSTEOARTHRITIS OF LEFT KNEE: Primary | ICD-10-CM

## 2025-07-31 DIAGNOSIS — G89.29 CHRONIC PAIN OF LEFT KNEE: ICD-10-CM

## 2025-07-31 PROCEDURE — 97112 NEUROMUSCULAR REEDUCATION: CPT

## 2025-07-31 PROCEDURE — 97110 THERAPEUTIC EXERCISES: CPT

## 2025-08-04 ENCOUNTER — OFFICE VISIT (OUTPATIENT)
Dept: PHYSICAL THERAPY | Facility: CLINIC | Age: 55
End: 2025-08-04
Payer: COMMERCIAL

## 2025-08-04 DIAGNOSIS — Z96.652 S/P TKR (TOTAL KNEE REPLACEMENT), LEFT: ICD-10-CM

## 2025-08-04 DIAGNOSIS — M17.12 PRIMARY OSTEOARTHRITIS OF LEFT KNEE: Primary | ICD-10-CM

## 2025-08-04 DIAGNOSIS — G89.29 CHRONIC PAIN OF LEFT KNEE: ICD-10-CM

## 2025-08-04 DIAGNOSIS — M25.562 CHRONIC PAIN OF LEFT KNEE: ICD-10-CM

## 2025-08-04 PROCEDURE — 97110 THERAPEUTIC EXERCISES: CPT

## 2025-08-04 PROCEDURE — 97530 THERAPEUTIC ACTIVITIES: CPT

## 2025-08-07 ENCOUNTER — OFFICE VISIT (OUTPATIENT)
Dept: PHYSICAL THERAPY | Facility: CLINIC | Age: 55
End: 2025-08-07
Payer: COMMERCIAL

## 2025-08-07 DIAGNOSIS — Z96.652 S/P TKR (TOTAL KNEE REPLACEMENT), LEFT: ICD-10-CM

## 2025-08-07 DIAGNOSIS — M17.12 PRIMARY OSTEOARTHRITIS OF LEFT KNEE: Primary | ICD-10-CM

## 2025-08-07 DIAGNOSIS — G89.29 CHRONIC PAIN OF LEFT KNEE: ICD-10-CM

## 2025-08-07 DIAGNOSIS — M25.562 CHRONIC PAIN OF LEFT KNEE: ICD-10-CM

## 2025-08-07 PROCEDURE — 97112 NEUROMUSCULAR REEDUCATION: CPT

## 2025-08-07 PROCEDURE — 97110 THERAPEUTIC EXERCISES: CPT

## 2025-08-08 ENCOUNTER — OFFICE VISIT (OUTPATIENT)
Dept: PHYSICAL THERAPY | Facility: CLINIC | Age: 55
End: 2025-08-08
Payer: COMMERCIAL

## 2025-08-08 DIAGNOSIS — M25.562 CHRONIC PAIN OF LEFT KNEE: ICD-10-CM

## 2025-08-08 DIAGNOSIS — M17.12 PRIMARY OSTEOARTHRITIS OF LEFT KNEE: Primary | ICD-10-CM

## 2025-08-08 DIAGNOSIS — Z96.652 S/P TKR (TOTAL KNEE REPLACEMENT), LEFT: ICD-10-CM

## 2025-08-08 DIAGNOSIS — G89.29 CHRONIC PAIN OF LEFT KNEE: ICD-10-CM

## 2025-08-08 PROCEDURE — 97110 THERAPEUTIC EXERCISES: CPT

## 2025-08-08 PROCEDURE — 97530 THERAPEUTIC ACTIVITIES: CPT

## 2025-08-11 ENCOUNTER — OFFICE VISIT (OUTPATIENT)
Dept: PHYSICAL THERAPY | Facility: CLINIC | Age: 55
End: 2025-08-11
Payer: COMMERCIAL

## 2025-08-13 ENCOUNTER — OFFICE VISIT (OUTPATIENT)
Dept: PHYSICAL THERAPY | Facility: CLINIC | Age: 55
End: 2025-08-13
Payer: COMMERCIAL

## 2025-08-15 ENCOUNTER — OFFICE VISIT (OUTPATIENT)
Dept: PHYSICAL THERAPY | Facility: CLINIC | Age: 55
End: 2025-08-15
Payer: COMMERCIAL

## 2025-08-18 ENCOUNTER — OFFICE VISIT (OUTPATIENT)
Dept: PHYSICAL THERAPY | Facility: CLINIC | Age: 55
End: 2025-08-18
Payer: COMMERCIAL

## 2025-08-18 ENCOUNTER — TELEPHONE (OUTPATIENT)
Age: 55
End: 2025-08-18

## 2025-08-18 DIAGNOSIS — M17.12 PRIMARY OSTEOARTHRITIS OF LEFT KNEE: Primary | ICD-10-CM

## 2025-08-18 DIAGNOSIS — G89.29 CHRONIC PAIN OF LEFT KNEE: ICD-10-CM

## 2025-08-18 DIAGNOSIS — M25.562 CHRONIC PAIN OF LEFT KNEE: ICD-10-CM

## 2025-08-18 DIAGNOSIS — Z96.652 S/P TKR (TOTAL KNEE REPLACEMENT), LEFT: ICD-10-CM

## 2025-08-18 PROCEDURE — 97110 THERAPEUTIC EXERCISES: CPT | Performed by: PHYSICAL THERAPIST

## 2025-08-18 PROCEDURE — 97112 NEUROMUSCULAR REEDUCATION: CPT | Performed by: PHYSICAL THERAPIST

## 2025-08-18 PROCEDURE — 97530 THERAPEUTIC ACTIVITIES: CPT | Performed by: PHYSICAL THERAPIST

## 2025-08-19 DIAGNOSIS — F41.9 ANXIETY: Primary | ICD-10-CM

## 2025-08-19 RX ORDER — DESVENLAFAXINE 50 MG/1
TABLET, FILM COATED, EXTENDED RELEASE ORAL
COMMUNITY
End: 2025-08-19 | Stop reason: SDUPTHER

## 2025-08-20 ENCOUNTER — OFFICE VISIT (OUTPATIENT)
Dept: PHYSICAL THERAPY | Facility: CLINIC | Age: 55
End: 2025-08-20
Payer: COMMERCIAL

## 2025-08-20 DIAGNOSIS — M25.562 CHRONIC PAIN OF LEFT KNEE: ICD-10-CM

## 2025-08-20 DIAGNOSIS — M17.12 PRIMARY OSTEOARTHRITIS OF LEFT KNEE: Primary | ICD-10-CM

## 2025-08-20 DIAGNOSIS — Z96.652 S/P TKR (TOTAL KNEE REPLACEMENT), LEFT: ICD-10-CM

## 2025-08-20 DIAGNOSIS — G89.29 CHRONIC PAIN OF LEFT KNEE: ICD-10-CM

## 2025-08-20 PROCEDURE — 97112 NEUROMUSCULAR REEDUCATION: CPT

## 2025-08-20 PROCEDURE — 97530 THERAPEUTIC ACTIVITIES: CPT

## 2025-08-20 PROCEDURE — 97110 THERAPEUTIC EXERCISES: CPT

## 2025-08-20 RX ORDER — DESVENLAFAXINE 50 MG/1
50 TABLET, FILM COATED, EXTENDED RELEASE ORAL DAILY
Qty: 90 TABLET | Refills: 1 | Status: SHIPPED | OUTPATIENT
Start: 2025-08-20 | End: 2026-08-15

## 2025-08-20 RX ORDER — DESVENLAFAXINE 50 MG/1
50 TABLET, FILM COATED, EXTENDED RELEASE ORAL DAILY
Qty: 7 TABLET | Refills: 0 | Status: SHIPPED | OUTPATIENT
Start: 2025-08-20 | End: 2025-08-27

## 2025-08-22 ENCOUNTER — OFFICE VISIT (OUTPATIENT)
Dept: PHYSICAL THERAPY | Facility: CLINIC | Age: 55
End: 2025-08-22
Payer: COMMERCIAL

## 2025-08-22 DIAGNOSIS — M17.12 PRIMARY OSTEOARTHRITIS OF LEFT KNEE: Primary | ICD-10-CM

## 2025-08-22 DIAGNOSIS — G89.29 CHRONIC PAIN OF LEFT KNEE: ICD-10-CM

## 2025-08-22 DIAGNOSIS — Z96.652 S/P TKR (TOTAL KNEE REPLACEMENT), LEFT: ICD-10-CM

## 2025-08-22 DIAGNOSIS — M25.562 CHRONIC PAIN OF LEFT KNEE: ICD-10-CM

## 2025-08-22 PROCEDURE — 97110 THERAPEUTIC EXERCISES: CPT | Performed by: PHYSICAL THERAPIST

## 2025-08-22 PROCEDURE — 97112 NEUROMUSCULAR REEDUCATION: CPT | Performed by: PHYSICAL THERAPIST

## (undated) DEVICE — EXOFIN PRECISION PEN HIGH VISCOSITY TOPICAL SKIN ADHESIVE: Brand: EXOFIN PRECISION PEN, 1G

## (undated) DEVICE — CHLORAPREP HI-LITE 10.5ML ORANGE

## (undated) DEVICE — ALLENTOWN DR  LUCENTE S LAP PK: Brand: CARDINAL HEALTH

## (undated) DEVICE — SYRINGE LOR 8ML PLASTIC LL

## (undated) DEVICE — FLEXIBLE ADHESIVE BANDAGE,X-LARGE: Brand: CURITY

## (undated) DEVICE — PAD GROUNDING IONIC RF DISP

## (undated) DEVICE — DRAPE SHEET THREE QUARTER

## (undated) DEVICE — SYRINGE 10ML LL

## (undated) DEVICE — GLOVE SRG LF STRL BGL SKNSNS 7.5 PF

## (undated) DEVICE — STERILE LATEX POWDER-FREE SURGICAL GLOVESWITH NITRILE COATING: Brand: PROTEXIS

## (undated) DEVICE — SYRINGE 5ML LL

## (undated) DEVICE — NEEDLE HYPO 22G X 1-1/2 IN

## (undated) DEVICE — UTILITY MARKER,BLACK WITH LABELS: Brand: DEVON

## (undated) DEVICE — SUT VICRYL 2-0 SH 27 IN UNDYED J417H

## (undated) DEVICE — NEEDLE SPINAL 22G X 3.5IN  QUINCKE

## (undated) DEVICE — TELFA ADHESIVE ISLAND DRESSING: Brand: TELFA

## (undated) DEVICE — PLASTIC ADHESIVE BANDAGE: Brand: CURITY

## (undated) DEVICE — GAUZE SPONGES,USP TYPE VII GAUZE, 12 PLY: Brand: CURITY

## (undated) DEVICE — Device

## (undated) DEVICE — DRAPE TOWEL: Brand: CONVERTORS

## (undated) DEVICE — NEEDLE 25G X 1 1/2

## (undated) DEVICE — SCD SEQUENTIAL COMPRESSION COMFORT SLEEVE MEDIUM KNEE LENGTH: Brand: KENDALL SCD

## (undated) DEVICE — IV FLUSH NSS 10ML POSIFLUSH

## (undated) DEVICE — GLOVE PI ULTRA TOUCH SZ.7.5

## (undated) DEVICE — ELECTRODE RF 10CM

## (undated) DEVICE — NEEDLE BLUNT 18 G X 1 1/2IN

## (undated) DEVICE — INTENDED FOR TISSUE SEPARATION, AND OTHER PROCEDURES THAT REQUIRE A SHARP SURGICAL BLADE TO PUNCTURE OR CUT.: Brand: BARD-PARKER SAFETY BLADES SIZE 11, STERILE

## (undated) DEVICE — SKIN MARKER DUAL TIP WITH RULER CAP, FLEXIBLE RULER AND LABELS: Brand: DEVON

## (undated) DEVICE — CATH FOLEY 18FR 5ML 2 WAY UNCOATED SILICONE

## (undated) DEVICE — NEEDLE 18 G X 1 1/2 SAFETY

## (undated) DEVICE — TRAY EPID CONT PERIFIX 18G X 3.5IN 10ML CLSD TIP

## (undated) DEVICE — IV EXTENSION TUBING SMALL BORE

## (undated) DEVICE — GAUZE SPONGES,16 PLY: Brand: CURITY

## (undated) DEVICE — TUBING SUCTION 5MM X 12 FT

## (undated) DEVICE — 3M™ STERI-DRAPE™ UNDER BUTTOCKS DRAPE WITH POUCH 1084: Brand: STERI-DRAPE™

## (undated) DEVICE — DRAPE EQUIPMENT RF WAND

## (undated) DEVICE — PREMIUM DRY TRAY LF: Brand: MEDLINE INDUSTRIES, INC.